# Patient Record
Sex: MALE | Race: WHITE | Employment: OTHER | URBAN - METROPOLITAN AREA
[De-identification: names, ages, dates, MRNs, and addresses within clinical notes are randomized per-mention and may not be internally consistent; named-entity substitution may affect disease eponyms.]

---

## 2019-12-09 ENCOUNTER — HOSPITAL ENCOUNTER (EMERGENCY)
Age: 49
Discharge: HOME | End: 2019-12-09
Attending: STUDENT IN AN ORGANIZED HEALTH CARE EDUCATION/TRAINING PROGRAM
Payer: SELF-PAY

## 2019-12-09 ENCOUNTER — APPOINTMENT (OUTPATIENT)
Dept: GENERAL RADIOLOGY | Age: 49
End: 2019-12-09
Attending: NURSE PRACTITIONER
Payer: SELF-PAY

## 2019-12-09 VITALS
HEIGHT: 62 IN | DIASTOLIC BLOOD PRESSURE: 55 MMHG | SYSTOLIC BLOOD PRESSURE: 111 MMHG | WEIGHT: 108 LBS | RESPIRATION RATE: 16 BRPM | OXYGEN SATURATION: 99 % | BODY MASS INDEX: 19.88 KG/M2 | HEART RATE: 85 BPM | TEMPERATURE: 98.2 F

## 2019-12-09 DIAGNOSIS — E11.65 TYPE 2 DIABETES MELLITUS WITH HYPERGLYCEMIA, WITHOUT LONG-TERM CURRENT USE OF INSULIN (HCC): Primary | ICD-10-CM

## 2019-12-09 LAB
ALBUMIN SERPL-MCNC: 4.7 G/DL (ref 3.5–5)
ALBUMIN/GLOB SERPL: 1.3 {RATIO} (ref 1.1–2.2)
ALP SERPL-CCNC: 201 U/L (ref 45–117)
ALT SERPL-CCNC: 55 U/L (ref 12–78)
ANION GAP SERPL CALC-SCNC: 9 MMOL/L (ref 5–15)
APPEARANCE UR: CLEAR
AST SERPL-CCNC: 28 U/L (ref 15–37)
BASE EXCESS BLDV CALC-SCNC: 0 MMOL/L
BASOPHILS # BLD: 0.1 K/UL (ref 0–0.1)
BASOPHILS NFR BLD: 1 % (ref 0–1)
BDY SITE: ABNORMAL
BILIRUB SERPL-MCNC: 0.8 MG/DL (ref 0.2–1)
BILIRUB UR QL: NEGATIVE
BUN SERPL-MCNC: 10 MG/DL (ref 6–20)
BUN/CREAT SERPL: 7 (ref 12–20)
CALCIUM SERPL-MCNC: 9.2 MG/DL (ref 8.5–10.1)
CHLORIDE SERPL-SCNC: 92 MMOL/L (ref 97–108)
CO2 SERPL-SCNC: 26 MMOL/L (ref 21–32)
COLOR UR: ABNORMAL
COMMENT, HOLDF: NORMAL
CREAT SERPL-MCNC: 1.39 MG/DL (ref 0.7–1.3)
DIFFERENTIAL METHOD BLD: ABNORMAL
EOSINOPHIL # BLD: 0.1 K/UL (ref 0–0.4)
EOSINOPHIL NFR BLD: 1 % (ref 0–7)
ERYTHROCYTE [DISTWIDTH] IN BLOOD BY AUTOMATED COUNT: 11.6 % (ref 11.5–14.5)
EST. AVERAGE GLUCOSE BLD GHB EST-MCNC: 306 MG/DL
GLOBULIN SER CALC-MCNC: 3.6 G/DL (ref 2–4)
GLUCOSE BLD STRIP.AUTO-MCNC: 322 MG/DL (ref 65–100)
GLUCOSE BLD STRIP.AUTO-MCNC: >600 MG/DL (ref 65–100)
GLUCOSE SERPL-MCNC: 718 MG/DL (ref 65–100)
GLUCOSE UR STRIP.AUTO-MCNC: >1000 MG/DL
HBA1C MFR BLD: 12.3 % (ref 4–5.6)
HCO3 BLDV-SCNC: 25 MMOL/L (ref 23–28)
HCT VFR BLD AUTO: 46.8 % (ref 36.6–50.3)
HGB BLD-MCNC: 16.8 G/DL (ref 12.1–17)
HGB UR QL STRIP: NEGATIVE
IMM GRANULOCYTES # BLD AUTO: 0 K/UL (ref 0–0.04)
IMM GRANULOCYTES NFR BLD AUTO: 0 % (ref 0–0.5)
KETONES SERPL QL: ABNORMAL
KETONES UR QL STRIP.AUTO: 15 MG/DL
LEUKOCYTE ESTERASE UR QL STRIP.AUTO: NEGATIVE
LYMPHOCYTES # BLD: 0.7 K/UL (ref 0.8–3.5)
LYMPHOCYTES NFR BLD: 6 % (ref 12–49)
MAGNESIUM SERPL-MCNC: 2.1 MG/DL (ref 1.6–2.4)
MCH RBC QN AUTO: 32.1 PG (ref 26–34)
MCHC RBC AUTO-ENTMCNC: 35.9 G/DL (ref 30–36.5)
MCV RBC AUTO: 89.3 FL (ref 80–99)
MONOCYTES # BLD: 0.5 K/UL (ref 0–1)
MONOCYTES NFR BLD: 4 % (ref 5–13)
NEUTS SEG # BLD: 11 K/UL (ref 1.8–8)
NEUTS SEG NFR BLD: 88 % (ref 32–75)
NITRITE UR QL STRIP.AUTO: NEGATIVE
NRBC # BLD: 0 K/UL (ref 0–0.01)
NRBC BLD-RTO: 0 PER 100 WBC
PCO2 BLDV: 42 MMHG (ref 41–51)
PH BLDV: 7.39 [PH] (ref 7.32–7.42)
PH UR STRIP: 5 [PH] (ref 5–8)
PLATELET # BLD AUTO: 249 K/UL (ref 150–400)
PMV BLD AUTO: 10.2 FL (ref 8.9–12.9)
PO2 BLDV: 49 MMHG (ref 25–40)
POTASSIUM SERPL-SCNC: 3.9 MMOL/L (ref 3.5–5.1)
PROT SERPL-MCNC: 8.3 G/DL (ref 6.4–8.2)
PROT UR STRIP-MCNC: NEGATIVE MG/DL
RBC # BLD AUTO: 5.24 M/UL (ref 4.1–5.7)
RBC MORPH BLD: ABNORMAL
SAMPLES BEING HELD,HOLD: NORMAL
SAO2 % BLDV: 84 % (ref 65–88)
SAO2% DEVICE SAO2% SENSOR NAME: ABNORMAL
SERVICE CMNT-IMP: ABNORMAL
SERVICE CMNT-IMP: ABNORMAL
SODIUM SERPL-SCNC: 127 MMOL/L (ref 136–145)
SP GR UR REFRACTOMETRY: 1 (ref 1–1.03)
SPECIMEN SITE: ABNORMAL
UROBILINOGEN UR QL STRIP.AUTO: 0.2 EU/DL (ref 0.2–1)
WBC # BLD AUTO: 12.4 K/UL (ref 4.1–11.1)

## 2019-12-09 PROCEDURE — 36415 COLL VENOUS BLD VENIPUNCTURE: CPT

## 2019-12-09 PROCEDURE — 96361 HYDRATE IV INFUSION ADD-ON: CPT

## 2019-12-09 PROCEDURE — 93005 ELECTROCARDIOGRAM TRACING: CPT

## 2019-12-09 PROCEDURE — 82803 BLOOD GASES ANY COMBINATION: CPT

## 2019-12-09 PROCEDURE — 85025 COMPLETE CBC W/AUTO DIFF WBC: CPT

## 2019-12-09 PROCEDURE — 80053 COMPREHEN METABOLIC PANEL: CPT

## 2019-12-09 PROCEDURE — 81003 URINALYSIS AUTO W/O SCOPE: CPT

## 2019-12-09 PROCEDURE — 82962 GLUCOSE BLOOD TEST: CPT

## 2019-12-09 PROCEDURE — 74011250636 HC RX REV CODE- 250/636: Performed by: STUDENT IN AN ORGANIZED HEALTH CARE EDUCATION/TRAINING PROGRAM

## 2019-12-09 PROCEDURE — 99285 EMERGENCY DEPT VISIT HI MDM: CPT

## 2019-12-09 PROCEDURE — 96360 HYDRATION IV INFUSION INIT: CPT

## 2019-12-09 PROCEDURE — 74011250636 HC RX REV CODE- 250/636: Performed by: NURSE PRACTITIONER

## 2019-12-09 PROCEDURE — 87086 URINE CULTURE/COLONY COUNT: CPT

## 2019-12-09 PROCEDURE — 83735 ASSAY OF MAGNESIUM: CPT

## 2019-12-09 PROCEDURE — 82009 KETONE BODYS QUAL: CPT

## 2019-12-09 PROCEDURE — 83036 HEMOGLOBIN GLYCOSYLATED A1C: CPT

## 2019-12-09 RX ORDER — METFORMIN HYDROCHLORIDE 500 MG/1
500 TABLET ORAL 2 TIMES DAILY WITH MEALS
Qty: 60 TAB | Refills: 0 | Status: SHIPPED | OUTPATIENT
Start: 2019-12-09 | End: 2019-12-10 | Stop reason: SDUPTHER

## 2019-12-09 RX ORDER — SODIUM CHLORIDE 9 MG/ML
1000 INJECTION, SOLUTION INTRAVENOUS ONCE
Status: COMPLETED | OUTPATIENT
Start: 2019-12-09 | End: 2019-12-09

## 2019-12-09 RX ORDER — LANCETS
EACH MISCELLANEOUS
Qty: 1 EACH | Refills: 11 | Status: SHIPPED | OUTPATIENT
Start: 2019-12-09

## 2019-12-09 RX ORDER — INSULIN PUMP SYRINGE, 3 ML
EACH MISCELLANEOUS
Qty: 1 KIT | Refills: 0 | Status: SHIPPED | OUTPATIENT
Start: 2019-12-09

## 2019-12-09 RX ADMIN — SODIUM CHLORIDE 1000 ML: 900 INJECTION, SOLUTION INTRAVENOUS at 13:36

## 2019-12-09 RX ADMIN — SODIUM CHLORIDE 1000 ML: 900 INJECTION, SOLUTION INTRAVENOUS at 13:07

## 2019-12-09 RX ADMIN — SODIUM CHLORIDE 1000 ML: 900 INJECTION, SOLUTION INTRAVENOUS at 14:26

## 2019-12-09 NOTE — ED TRIAGE NOTES
Pt sent from [facility] due to possible new onset diabetes. Reports feeling increase in thirst, weight loss since this past Thanksgiving.

## 2019-12-09 NOTE — ED NOTES
12:06 PM  I have just evaluated the patient. I have reviewed His vital signs and determined there is currently no worsening in their condition or physical exam. I have talked with the patient and the family and advised them that I am the provider in triage and have ordered lab work, x rays and other diagnostic tests. I have advised them that we will try and get them to the back as soon as possible. I have also advised them that should they have a worsening condition or any problems before they are sent back to the main ED, to contact me or the triage nurse. Initial Complaint: Elevated BG    Started: Before Thanksgiving    Endorses: Sent by [facility]. Polyuria, polydipsia, weight loss. Usually ~ 118, was weighed at 108 today. Denies: polyphagia      No further complaints. Orders placed.      Primary care provider: Arbie Fothergill, MD (Inactive)    Juan Antonio Rivera NP

## 2019-12-09 NOTE — DISCHARGE INSTRUCTIONS
RETURN IF BLOOD SUGAR IS PERSISTENTLY OVER 400  RETURN IF SYMPTOMS--VOMITING, ABDOMINAL PAIN, DIZZINESS  IF YOUR SUGAR DROPS BELOW 70 THEN EAT/DRINK SOMETHING WITH SUGAR TO HELP BRING IT UP

## 2019-12-09 NOTE — PROGRESS NOTES
12/9/2019  3:06 PM  Date of previous inpatient admission/ ED visit? N/A  What brought the patient back to ED? Fatigue, increased thirst weight loss since Thanksgiving    Did patient decline recommended services during last admission/ ED visit (if yes, what)? N/A    Has patient seen a provider since their last inpatient admission/ED visit (if yes, when)? No    CM Interventions:  From previous inpatient admission/ED visit:  N/A  From current inpatient admission/ED visit:  Consult noted to assist pt w/ diabetic supplies, PCP. CM met w/ pt he confirmed he does not have current PCP or medical insurance, pt is self-employed and unable to afford premiums. Pt has not applied for Medicaid since 1/2019. · Pt is agreeable to go to [facility] for PCP. · CM scheduled pt w/ PCP f/u 12/10/19 and added to AVS.    ·       CM provided pt w/ contact information for [facility] to apply for Medicaid   · pt has care Card application and will complete it. · CM provided pt w/ information for Free Medications at [facility] which includes Metformin  · CM provided contact information, added to AVS for [facility] to schedule classes. Pt has no questions, voiced understanding of the plan.   Edgar Ibarra

## 2019-12-09 NOTE — ED NOTES
Discharge paperwork reviewed with patient, patient educated on sign and symptoms related to diabetes and when to come back to the ED, pt understands all teaching at this time and has no questions.

## 2019-12-10 ENCOUNTER — OFFICE VISIT (OUTPATIENT)
Dept: FAMILY MEDICINE CLINIC | Age: 49
End: 2019-12-10

## 2019-12-10 VITALS
SYSTOLIC BLOOD PRESSURE: 116 MMHG | DIASTOLIC BLOOD PRESSURE: 66 MMHG | RESPIRATION RATE: 18 BRPM | TEMPERATURE: 98.6 F | BODY MASS INDEX: 21.09 KG/M2 | HEIGHT: 62 IN | WEIGHT: 114.6 LBS | HEART RATE: 68 BPM | OXYGEN SATURATION: 99 %

## 2019-12-10 DIAGNOSIS — E11.65 TYPE 2 DIABETES MELLITUS WITH HYPERGLYCEMIA, UNSPECIFIED WHETHER LONG TERM INSULIN USE (HCC): Primary | ICD-10-CM

## 2019-12-10 DIAGNOSIS — Z76.89 ENCOUNTER TO ESTABLISH CARE: ICD-10-CM

## 2019-12-10 LAB
ATRIAL RATE: 75 BPM
CALCULATED P AXIS, ECG09: 78 DEGREES
CALCULATED R AXIS, ECG10: 56 DEGREES
CALCULATED T AXIS, ECG11: 55 DEGREES
DIAGNOSIS, 93000: NORMAL
GLUCOSE DOSE-GTT, POCT, GLDSPOCT: 227
P-R INTERVAL, ECG05: 128 MS
Q-T INTERVAL, ECG07: 372 MS
QRS DURATION, ECG06: 84 MS
QTC CALCULATION (BEZET), ECG08: 415 MS
VENTRICULAR RATE, ECG03: 75 BPM

## 2019-12-10 RX ORDER — METFORMIN HYDROCHLORIDE 500 MG/1
500 TABLET ORAL 2 TIMES DAILY WITH MEALS
Qty: 120 TAB | Refills: 0 | Status: SHIPPED | OUTPATIENT
Start: 2019-12-10 | End: 2019-12-10

## 2019-12-10 RX ORDER — METFORMIN HYDROCHLORIDE 500 MG/1
500 TABLET ORAL 2 TIMES DAILY WITH MEALS
Qty: 120 TAB | Refills: 0 | Status: SHIPPED | OUTPATIENT
Start: 2019-12-10 | End: 2020-01-30 | Stop reason: SDUPTHER

## 2019-12-10 NOTE — PROGRESS NOTES
Subjective  Tana Guzman is an 44 y.o. male who presents to establish care for management of diabetes. Pt shares he went to the ED yesterday and dx with DM. Initial POC glucose greater than 600. A1C also noted to be 12.3%. received 3 L of NS. Discharged with POC glucose of 322. Pt started on metformin 500 mg BID. This is a new diagnosis. He reports a few weeks of increased thirst, urinary frequency, fatigue, and blurred vision prior to going to ED. His got new glasses 5 days ago to correct his vision. Notes diet is not controlled. Drinks soda, sweet ice tea, and juices during the day. Fast food for most of his meals. He feels much better today. He has improved diet and has eliminated all intake of drinking anything except water. Last meal was 30 min ago, roast beef sandwhich    He denies abdominal pain, chest pain, nausea, vomiting, diarrhea, fever, and chills. Social hx: denies tobacco use, EtOH use, and illicit drug use    No family history of diabetes    Allergies - reviewed:   No Known Allergies      Medications - reviewed:   Current Outpatient Medications   Medication Sig    metFORMIN (GLUCOPHAGE) 500 mg tablet Take 1 Tab by mouth two (2) times daily (with meals).  Blood-Glucose Meter monitoring kit Use 4x per day to check sugar    glucose blood VI test strips (BLOOD GLUCOSE TEST) strip Use to check sugar 4 times per day    lancets misc Use to check sugar 4 times per day     No current facility-administered medications for this visit. Social History - reviewed:  Social History     Tobacco Use    Smoking status: Never Smoker    Smokeless tobacco: Never Used   Substance and Sexual Activity    Alcohol use: Not Currently       ROS  Constitutional: Negative for chills and fever. HENT: Negative for congestion and sore throat. Eyes: Negative for pain and redness. Respiratory: Negative for cough and shortness of breath.     Cardiovascular: Negative for chest pain and palpitations. Gastrointestinal: Negative for abdominal pain, diarrhea, nausea and vomiting. Genitourinary: Negative for dysuria, hematuria. Musculoskeletal: Negative for back pain and neck pain. Skin: Negative for rash and wound. Neurological: Negative for dizziness and headaches. Hematological: Does not bruise/bleed easily. All other systems reviewed and are negative. Physical Exam  Visit Vitals  /66   Pulse 68   Temp 98.6 °F (37 °C)   Resp 18   Ht 5' 2\" (1.575 m)   Wt 114 lb 9.6 oz (52 kg)   SpO2 99%   BMI 20.96 kg/m²       General appearance - alert, well appearing but thin, and in no distress  Eyes - pupils equal and reactive, extraocular eye movements intact  Ears - bilateral TM's and external ear canals normal  Nose - normal and patent, no erythema, discharge or polyps  Mouth - mucous membranes moist, pharynx normal without lesions  Neck - supple, no significant adenopathy  Chest - clear to auscultation, no wheezes, rales or rhonchi, symmetric air entry  Heart - normal rate, regular rhythm, normal S1, S2, no murmurs, rubs, clicks or gallops  Abdomen - soft, nontender, nondistended, no masses or organomegaly  Neurological - alert, oriented, normal speech, no focal findings or movement disorder noted  Musculoskeletal - no joint tenderness, deformity or swelling  Extremities - peripheral pulses normal, no pedal edema, no clubbing or cyanosis  Skin - normal coloration and turgor, no rashes, no suspicious skin lesions noted    Personally reviewed:  POC glucose 227      Assessment/Plan    ICD-10-CM ICD-9-CM    1. Type 2 diabetes mellitus with hyperglycemia, unspecified whether long term insulin use (HCC) E11.65 250.00 AMB POC GLUCOSE TEST      metFORMIN (GLUCOPHAGE) 500 mg tablet      DISCONTINUED: metFORMIN (GLUCOPHAGE) 500 mg tablet   2. Encounter to establish care Z76.89 V65.8      Uncontrolled DM: new dx noted yesterday in ED after a few weeks of symptoms.  Has improved diet  -Discussed diet and lifestyle in great detail. Has appt with diabetic education  -Continue metformin, will need to monitor BG for now. If BG not below 200 in 2 days than increase to 1000 mg in am and 500 mg in pm. SE profile reviewed. Will follow up next week with BG log. If not improved will add second agent. Pt agreed with plan  -precautions given to when to seek medical attn. All questions answered. Discussed with attending      I have discussed the diagnosis with the patient and the intended plan as seen in the above orders. Patient verbalized understanding of the plan and agrees with the plan. The patient has received an after-visit summary and questions were answered concerning future plans. I have discussed medication side effects and warnings with the patient as well. Informed patient to return to the office if new symptoms arise.         Mike Bates DO  Family Medicine Resident

## 2019-12-10 NOTE — PATIENT INSTRUCTIONS

## 2019-12-11 LAB
BACTERIA SPEC CULT: NORMAL
CC UR VC: NORMAL
SERVICE CMNT-IMP: NORMAL

## 2019-12-20 ENCOUNTER — OFFICE VISIT (OUTPATIENT)
Dept: FAMILY MEDICINE CLINIC | Age: 49
End: 2019-12-20

## 2019-12-20 VITALS
HEART RATE: 75 BPM | BODY MASS INDEX: 20.61 KG/M2 | HEIGHT: 62 IN | RESPIRATION RATE: 18 BRPM | SYSTOLIC BLOOD PRESSURE: 130 MMHG | WEIGHT: 112 LBS | TEMPERATURE: 98 F | OXYGEN SATURATION: 97 % | DIASTOLIC BLOOD PRESSURE: 54 MMHG

## 2019-12-20 DIAGNOSIS — E11.65 TYPE 2 DIABETES MELLITUS WITH HYPERGLYCEMIA, UNSPECIFIED WHETHER LONG TERM INSULIN USE (HCC): Primary | ICD-10-CM

## 2019-12-20 NOTE — PROGRESS NOTES
[facility] [address] [place] [address]   Office [phone], Fax [phone]    Subjective:   Kateryna Galindo is a 44 y.o. male   CC:   Chief Complaint   Patient presents with    High Blood Sugar     Follow up from 12/10/19      History provided by patient     HPI:    Diabetes F/U  He is checking his BG FBG, before lunch and before dinner. His BG logs shows BG 190s-250s. Today's readings 190s. He has changed his diet since dx of diabetes. Cut out sweat tea, juices and soda since last visit. Feeling better than before. No fatigue or Nausea. Vision is better  Diet: Tries to do low carb and minimize sugary snacks. Snacks: peanuts; Breakfast: apple and Currently on: Metformin 1000mg BID  Patient is unwilling to try insulin. \"I rather die\". He only wants to be on oral agents and the cheaper the better. A1c 12.3%   No FH of diabetes    History reviewed. No pertinent past medical history. No Known Allergies  Current Outpatient Medications on File Prior to Visit   Medication Sig Dispense Refill    metFORMIN (GLUCOPHAGE) 500 mg tablet Take 1 Tab by mouth two (2) times daily (with meals). 120 Tab 0    Blood-Glucose Meter monitoring kit Use 4x per day to check sugar 1 Kit 0    glucose blood VI test strips (BLOOD GLUCOSE TEST) strip Use to check sugar 4 times per day 120 Strip 2    lancets misc Use to check sugar 4 times per day 1 Each 11     No current facility-administered medications on file prior to visit. History reviewed. No pertinent family history. Social History     Socioeconomic History    Marital status:      Spouse name: Not on file    Number of children: Not on file    Years of education: Not on file    Highest education level: Not on file   Tobacco Use    Smoking status: Never Smoker    Smokeless tobacco: Never Used   Substance and Sexual Activity    Alcohol use: Not Currently     History reviewed. No pertinent surgical history.     There is no problem list on file for this patient. ROS      Objective:     Visit Vitals  /54 (BP 1 Location: Left arm, BP Patient Position: Sitting)   Pulse 75   Temp 98 °F (36.7 °C) (Oral)   Resp 18   Ht 5' 2\" (1.575 m)   Wt 112 lb (50.8 kg)   SpO2 97%   BMI 20.49 kg/m²        Physical Exam    GEN: Alert and oriented. In no acute distress  HEENT: Normocephalic /Atraumatic. Sclera anicterus  CV: RRR. No M/R/R  Resp: Normal work of breathing. CTAB. No wheezes    Assessment and orders:     Jazmin Lizama is a 44 y.o. WHITE OR  male presents with:     ICD-10-CM ICD-9-CM    1. Type 2 diabetes mellitus with hyperglycemia, unspecified whether long term insulin use (HCC) E11.65 250.00        - BG improved since starting Metformin. Continue Metformin 100mg BID  - Continue to log BG  - Declined starting insulin  - Will hold off starting 2nd agent, can consider Glipizide as 2nd agent   - Discussed with patient that he needs an annual physical exam with labs     Follow up: 2 weeks    I have reviewed patient medical and social history and medications. I have reviewed pertinent labs results and other data. I have discussed the diagnosis with the patient and the intended plan as seen in the above orders. The patient has received an after-visit summary and questions were answered concerning future plans. I have discussed medication side effects and warnings with the patient as well.     Patient discussed with Dr. Keegan Salmon, Attending Physician    MD Shital Casaino Family Medicine Resident  12/21/19

## 2019-12-20 NOTE — PROGRESS NOTES
Chief Complaint   Patient presents with    High Blood Sugar     Follow up from 12/10/19      Reviewed the chart and obtain necessary information for visit. 1. Have you been to the ER, urgent care clinic since your last visit? Hospitalized since your last visit? No    2. Have you seen or consulted any other health care providers outside of the [address] [place] [address] since your last visit? Include any pap smears or colon screening.  No

## 2019-12-20 NOTE — PATIENT INSTRUCTIONS
Nutrition Tips for Diabetes: After Your Visit  Your Care Instructions  A healthy diet is important to manage diabetes. It helps you lose weight (if you need to) and keep it off. It gives you the nutrition and energy your body needs and helps prevent heart disease. But a diet for diabetes does not mean that you have to eat special foods. You can eat what your family eats, including occasional sweets and other favorites. But you do have to pay attention to how often you eat and how much you eat of certain foods. The right plan for you will give you meals that help you keep your blood sugar at healthy levels. Try to eat a variety of foods and to spread carbohydrate throughout the day. Carbohydrate raises blood sugar higher and more quickly than any other nutrient does. Carbohydrate is found in sugar, breads and cereals, fruit, starchy vegetables such as potatoes and corn, and milk and yogurt. You may want to work with a dietitian or diabetes educator to help you plan meals and snacks. A dietitian or diabetes educator also can help you lose weight if that is one of your goals. The following tips can help you enjoy your meals and stay healthy. Follow-up care is a key part of your treatment and safety. Be sure to make and go to all appointments, and call your doctor if you are having problems. Its also a good idea to know your test results and keep a list of the medicines you take. How can you care for yourself at home? · Learn which foods have carbohydrate and how much carbohydrate to eat. A dietitian or diabetes educator can help you learn to keep track of how much carbohydrate you eat. · Spread carbohydrate throughout the day. Eat some carbohydrate at all meals, but do not eat too much at any one time. · Plan meals to include food from all the food groups.  These are the food groups and some example portion sizes:  ¨ Grains: 1 slice of bread (1 ounce), ½ cup of cooked cereal, and 1/3 cup of cooked pasta or rice. These have about 15 grams of carbohydrate in a serving. Choose whole grains such as whole wheat bread or crackers, oatmeal, and brown rice more often than refined grains. ¨ Fruit: 1 small fresh fruit, such as an apple or orange; ½ of a banana; ½ cup of chopped, cooked, or canned fruit; ½ cup of fruit juice; 1 cup of melon or raspberries; and 2 tablespoons of dried fruit. These have about 15 grams of carbohydrate in a serving. ¨ Dairy: 1 cup of nonfat or low-fat milk and 2/3 cup of plain yogurt. These have about 15 grams of carbohydrate in a serving. ¨ Protein foods: Beef, chicken, turkey, fish, eggs, tofu, cheese, cottage cheese, and peanut butter. A serving size of meat is 3 ounces, which is about the size of a deck of cards. Examples of meat substitute serving sizes (equal to 1 ounce of meat) are 1/4 cup of cottage cheese, 1 egg, 1 tablespoon of peanut butter, and ½ cup of tofu. These have very little or no carbohydrate per serving. ¨ Vegetables: Starchy vegetables such as ½ cup of cooked dried beans, peas, potatoes, or corn have about 15 grams of carbohydrate. Nonstarchy vegetables have very little carbohydrate, such as 1 cup of raw leafy vegetables (such as spinach), ½ cup of other vegetables (cooked or chopped), and 3/4 cup of vegetable juice. · Use the plate format to plan meals. It is a good, quick way to make sure that you have a balanced meal. It also helps you spread carbohydrate throughout the day. You divide your plate by types of foods. Put vegetables on half the plate, meat or meat substitutes on one-quarter of the plate, and a grain or starchy vegetable (such as brown rice or a potato) in the final quarter of the plate. To this you can add a small piece of fruit and 1 cup of milk or yogurt, depending on how much carbohydrate you are supposed to eat at a meal.  · Talk to your dietitian or diabetes educator about ways to add limited amounts of sweets into your meal plan.  You can eat these foods now and then, as long as you include the amount of carbohydrate they have in your daily carbohydrate allowance. · If you drink alcohol, limit it to no more than 1 drink a day for women and 2 drinks a day for men. If you are pregnant, no amount of alcohol is known to be safe. · Protein, fat, and fiber do not raise blood sugar as much as carbohydrate does. If you eat a lot of these nutrients in a meal, your blood sugar will rise more slowly than it would otherwise. · Limit saturated fats, such as those from meat and dairy products. Try to replace it with monounsaturated fat, such as olive oil. This is a healthier choice because people who have diabetes are at higher-than-average risk of heart disease. But use a modest amount of olive oil. A tablespoon of olive oil has 14 grams of fat and 120 calories. · Exercise lowers blood sugar. If you take insulin by shots or pump, you can use less than you would if you were not exercising. Keep in mind that timing matters. If you exercise within 1 hour after a meal, your body may need less insulin for that meal than it would if you exercised 3 hours after the meal. Test your blood sugar to find out how exercise affects your need for insulin. · Exercise on most days of the week. Aim for at least 30 minutes. Exercise helps you stay at a healthy weight and helps your body use insulin. Walking is an easy way to get exercise. Gradually increase the amount you walk every day. You also may want to swim, bike, or do other activities. When you eat out  · Learn to estimate the serving sizes of foods that have carbohydrate. If you measure food at home, it will be easier to estimate the amount in a serving of restaurant food. · If the meal you order has too much carbohydrate (such as potatoes, corn, or baked beans), ask to have a low-carbohydrate food instead. Ask for a salad or green vegetables.   · If you use insulin, check your blood sugar before and after eating out to help you plan how much to eat in the future. · If you eat more carbohydrate at a meal than you had planned, take a walk or do other exercise. This will help lower your blood sugar. Where can you learn more? Go to [employer].be  Enter S174 in the search box to learn more about \"Nutrition Tips for Diabetes: After Your Visit. \"   © 4852-4687 Health[employer], Incorporated. Care instructions adapted under license by [facility] (which disclaims liability or warranty for this information). This care instruction is for use with your licensed healthcare professional. If you have questions about a medical condition or this instruction, always ask your healthcare professional. [address] any warranty or liability for your use of this information. Content Version: 24.5.447766; Current as of: June 4, 2014                 Learning About Meal Planning for Diabetes  Why plan your meals? Meal planning can be a key part of managing diabetes. Planning meals and snacks with the right balance of carbohydrate, protein, and fat can help you keep your blood sugar at the target level you set with your doctor. You don't have to eat special foods. You can eat what your family eats, including sweets once in a while. But you do have to pay attention to how often you eat and how much you eat of certain foods. You may want to work with a dietitian or a certified diabetes educator. He or she can give you tips and meal ideas and can answer your questions about meal planning. This health professional can also help you reach a healthy weight if that is one of your goals. What plan is right for you? Your dietitian or diabetes educator may suggest that you start with the plate format or carbohydrate counting. The plate format  The plate format is a simple way to help you manage how you eat. You plan meals by learning how much space each food should take on a plate.  Using the plate format helps you spread carbohydrate throughout the day. It can make it easier to keep your blood sugar level within your target range. It also helps you see if you're eating healthy portion sizes. To use the plate format, you put non-starchy vegetables on half your plate. Add meat or meat substitutes on one-quarter of the plate. Put a grain or starchy vegetable (such as brown rice or a potato) on the final quarter of the plate. You can add a small piece of fruit and some low-fat or fat-free milk or yogurt, depending on your carbohydrate goal for each meal.  Here are some tips for using the plate format:  · Make sure that you are not using an oversized plate. A 9-inch plate is best. Many restaurants use larger plates. · Get used to using the plate format at home. Then you can use it when you eat out. · Write down your questions about using the plate format. Talk to your doctor, a dietitian, or a diabetes educator about your concerns. Carbohydrate counting  With carbohydrate counting, you plan meals based on the amount of carbohydrate in each food. Carbohydrate raises blood sugar higher and more quickly than any other nutrient. It is found in desserts, breads and cereals, and fruit. It's also found in starchy vegetables such as potatoes and corn, grains such as rice and pasta, and milk and yogurt. Spreading carbohydrate throughout the day helps keep your blood sugar levels within your target range. Your daily amount depends on several things, including your weight, how active you are, which diabetes medicines you take, and what your goals are for your blood sugar levels. A registered dietitian or diabetes educator can help you plan how much carbohydrate to include in each meal and snack. A guideline for your daily amount of carbohydrate is:  · 45 to 60 grams at each meal. That's about the same as 3 to 4 carbohydrate servings. · 15 to 20 grams at each snack. That's about the same as 1 carbohydrate serving.   The Nutrition Facts label on packaged foods tells you how much carbohydrate is in a serving of the food. First, look at the serving size on the food label. Is that the amount you eat in a serving? All of the nutrition information on a food label is based on that serving size. So if you eat more or less than that, you'll need to adjust the other numbers. Total carbohydrate is the next thing you need to look for on the label. If you count carbohydrate servings, one serving of carbohydrate is 15 grams. For foods that don't come with labels, such as fresh fruits and vegetables, you'll need a guide that lists carbohydrate in these foods. Ask your doctor, dietitian, or diabetes educator about books or other nutrition guides you can use. If you take insulin, you need to know how many grams of carbohydrate are in a meal. This lets you know how much rapid-acting insulin to take before you eat. If you use an insulin pump, you get a constant rate of insulin during the day. So the pump must be programmed at meals to give you extra insulin to cover the rise in blood sugar after meals. When you know how much carbohydrate you will eat, you can take the right amount of insulin. Or, if you always use the same amount of insulin, you need to make sure that you eat the same amount of carbohydrate at meals. If you need more help to understand carbohydrate counting and food labels, ask your doctor, dietitian, or diabetes educator. How do you get started with meal planning? Here are some tips to get started:  · Plan your meals a week at a time. Don't forget to include snacks too. · Use cookbooks or online recipes to plan several main meals. Plan some quick meals for busy nights. You also can double some recipes that freeze well. Then you can save half for other busy nights when you don't have time to cook. · Make sure you have the ingredients you need for your recipes.  If you're running low on basic items, put these items on your shopping list too.  · List foods that you use to make breakfasts, lunches, and snacks. List plenty of fruits and vegetables. · Post this list on the refrigerator. Add to it as you think of more things you need. · Take the list to the store to do your weekly shopping. Follow-up care is a key part of your treatment and safety. Be sure to make and go to all appointments, and call your doctor if you are having problems. It's also a good idea to know your test results and keep a list of the medicines you take. Where can you learn more? Go to http://karl-jen.info/. Damion Rodriguez in the search box to learn more about \"Learning About Meal Planning for Diabetes. \"  Current as of: April 16, 2019  Content Version: 12.2  © 9407-5439 [employer]. Care instructions adapted under license by [facility] (which disclaims liability or warranty for this information). If you have questions about a medical condition or this instruction, always ask your healthcare professional. [address] any warranty or liability for your use of this information.

## 2020-01-30 ENCOUNTER — TELEPHONE (OUTPATIENT)
Dept: FAMILY MEDICINE CLINIC | Age: 50
End: 2020-01-30

## 2020-01-30 DIAGNOSIS — E11.65 TYPE 2 DIABETES MELLITUS WITH HYPERGLYCEMIA, UNSPECIFIED WHETHER LONG TERM INSULIN USE (HCC): ICD-10-CM

## 2020-01-30 RX ORDER — METFORMIN HYDROCHLORIDE 500 MG/1
500 TABLET ORAL 2 TIMES DAILY WITH MEALS
Qty: 120 TAB | Refills: 0 | Status: SHIPPED | OUTPATIENT
Start: 2020-01-30 | End: 2020-03-04 | Stop reason: SDUPTHER

## 2020-01-30 NOTE — TELEPHONE ENCOUNTER
----- Message from Evita Montenegro sent at 1/30/2020 10:44 AM EST -----  Regarding: Dr. Dionna Fonseca  Env Medication Refill    Caller (if not patient):      Relationship of caller (if not patient):      Best contact number(s): [phone]      Name of medication and dosage if known: Metformin 500mg       Is patient out of this medication (yes/no):  No      Pharmacy name: [facility]     Pharmacy listed in chart? (yes/no): Yes     Pharmacy phone number: [phone]      Details to clarify the request:      Evita Montenegro

## 2020-03-04 ENCOUNTER — TELEPHONE (OUTPATIENT)
Dept: FAMILY MEDICINE CLINIC | Age: 50
End: 2020-03-04

## 2020-03-04 DIAGNOSIS — E11.65 TYPE 2 DIABETES MELLITUS WITH HYPERGLYCEMIA, UNSPECIFIED WHETHER LONG TERM INSULIN USE (HCC): Primary | ICD-10-CM

## 2020-03-04 RX ORDER — METFORMIN HYDROCHLORIDE 500 MG/1
1000 TABLET ORAL 2 TIMES DAILY WITH MEALS
Qty: 120 TAB | Refills: 0 | Status: SHIPPED | OUTPATIENT
Start: 2020-03-04 | End: 2020-04-03 | Stop reason: SDUPTHER

## 2020-03-04 RX ORDER — METFORMIN HYDROCHLORIDE 500 MG/1
1000 TABLET ORAL 2 TIMES DAILY WITH MEALS
Qty: 120 TAB | Refills: 0 | Status: SHIPPED | OUTPATIENT
Start: 2020-03-04 | End: 2020-03-04 | Stop reason: SDUPTHER

## 2020-03-04 NOTE — TELEPHONE ENCOUNTER
----- Message from LaunchKey Backer sent at 3/4/2020 10:31 AM EST -----  Regarding: Dr. Ran [place]: [id number] (if not patient): n/a  Relationship of caller (if not patient): n/a  Best contact number(s):   [phone]  Name of medication and dosage if known: Metformin-dosage unknown  Is patient out of this medication (yes/no): Yes  Pharmacy name: [address] [place] [address] St listed in chart? (yes/no): Yes  Pharmacy phone number: [phone]  Date of last visit: January 9, 2020  04:00 PM  Details to clarify the request: Pt was wondering if her could get a longer supply of his medication.

## 2020-03-17 ENCOUNTER — TELEPHONE (OUTPATIENT)
Dept: FAMILY MEDICINE CLINIC | Age: 50
End: 2020-03-17

## 2020-03-17 NOTE — TELEPHONE ENCOUNTER
Patient is doing well currently and he does not have any acute needs or medication refills.   Patient understands that his office visit will be canceled tomorrow because of non-essential visit

## 2020-04-03 DIAGNOSIS — E11.65 TYPE 2 DIABETES MELLITUS WITH HYPERGLYCEMIA, UNSPECIFIED WHETHER LONG TERM INSULIN USE (HCC): ICD-10-CM

## 2020-04-03 RX ORDER — METFORMIN HYDROCHLORIDE 500 MG/1
1000 TABLET ORAL 2 TIMES DAILY WITH MEALS
Qty: 120 TAB | Refills: 0 | Status: SHIPPED | OUTPATIENT
Start: 2020-04-03 | End: 2020-05-05

## 2020-05-04 ENCOUNTER — TELEPHONE (OUTPATIENT)
Dept: FAMILY MEDICINE CLINIC | Age: 50
End: 2020-05-04

## 2020-05-04 NOTE — TELEPHONE ENCOUNTER
[place]/Refill   Received: Today   Message Contents   [address]   Phone Number: [phone]             Caller (if not patient): N/A   Relationship of caller (if not patient): N/A   Best contact number(s): [phone]   Name of medication and dosage if known:  \"Metformin 500 MG\"   Is patient out of this medication (yes/no):  No   Pharmacy name: [facility]   Pharmacy listed in chart? (yes/no): Yes   Pharmacy phone number: [phone]   Date of last visit: 12/20/2019   Details to clarify the request: N/A

## 2020-05-13 ENCOUNTER — TELEPHONE (OUTPATIENT)
Dept: FAMILY MEDICINE CLINIC | Age: 50
End: 2020-05-13

## 2020-05-13 NOTE — TELEPHONE ENCOUNTER
Patient calling and states the pharmacy only gave him 2 weeks of medication for metformin (GLUCOPHAGE) 500 mg tablet  And not 30 days. He states he took his last pill this morning. Patient has virtual visit in the morning at office and I explained the doctor could address at that time. Patient states he's very concerned as he takes his medication at the same time every day and has to go directly to work after his virtual visit and will not be able to get medication right away. Patient asking to be contacted and assured that he will be okay with not taking medication timely and that this won't affect anything. Asking to be contacted ASAP with response.     Ottoniel Guzman (Self) [phone] (H)

## 2020-05-14 ENCOUNTER — VIRTUAL VISIT (OUTPATIENT)
Dept: FAMILY MEDICINE CLINIC | Age: 50
End: 2020-05-14

## 2020-05-14 DIAGNOSIS — E11.9 TYPE 2 DIABETES MELLITUS WITHOUT COMPLICATION, WITHOUT LONG-TERM CURRENT USE OF INSULIN (HCC): Primary | ICD-10-CM

## 2020-05-14 DIAGNOSIS — N18.9 CHRONIC KIDNEY DISEASE, UNSPECIFIED CKD STAGE: ICD-10-CM

## 2020-05-14 DIAGNOSIS — Z28.21 REFUSED PNEUMOCOCCAL VACCINATION: ICD-10-CM

## 2020-05-14 RX ORDER — METFORMIN HYDROCHLORIDE 500 MG/1
1000 TABLET ORAL 2 TIMES DAILY WITH MEALS
Qty: 120 TAB | Refills: 0 | Status: SHIPPED | OUTPATIENT
Start: 2020-05-14 | End: 2020-06-13

## 2020-05-14 NOTE — TELEPHONE ENCOUNTER
Pt notified Metformin rx sent to pharmacy.  Pt states just received text from pharmacy that its ready for

## 2020-05-14 NOTE — PATIENT INSTRUCTIONS
Pneumococcal Polysaccharide Vaccine: What You Need to Know Why get vaccinated? Pneumococcal polysaccharide vaccine (PPSV23) can prevent pneumococcal disease. Pneumococcal disease refers to any illness caused by pneumococcal bacteria. These bacteria can cause many types of illnesses, including pneumonia, which is an infection of the lungs. Pneumococcal bacteria are one of the most common causes of pneumonia. Besides pneumonia, pneumococcal bacteria can also cause: 
· Ear infections, 
· Sinus infections · Meningitis (infection of the tissue covering the brain and spinal cord) · Bacteremia (bloodstream infection) Anyone can get pneumococcal disease, but children under 3years of age, people with certain medical conditions, adults 72 years or older, and cigarette smokers are at the highest risk. Most pneumococcal infections are mild. However, some can result in long-term problems, such as brain damage or hearing loss. Meningitis, bacteremia, and pneumonia caused by pneumococcal disease can be fatal. 
PPSV23 
PPSV23 protects against 23 types of bacteria that cause pneumococcal disease. PPSV23 is recommended for: · All adults 72 years or older, · Anyone 2 years or older with certain medical conditions that can lead to an increased risk for pneumococcal disease. Most people need only one dose of PPSV23. A second dose of PPSV23, and another type of pneumococcal vaccine called PCV13, are recommended for certain high-risk groups. Your health care provider can give you more information. People 65 years or older should get a dose of PPSV23 even if they have already gotten one or more doses of the vaccine before they turned 65. Talk with your health care provider Tell your vaccine provider if the person getting the vaccine: 
· Has had an allergic reaction after a previous dose of PPSV23, or has any severe, life-threatening allergies.  
In some cases, your health care provider may decide to postpone PPSV23 vaccination to a future visit. People with minor illnesses, such as a cold, may be vaccinated. People who are moderately or severely ill should usually wait until they recover before getting PPSV23. Your health care provider can give you more information. Risks of a vaccine reaction · Redness or pain where the shot is given, feeling tired, fever, or muscle aches can happen after PPSV23. People sometimes faint after medical procedures, including vaccination. Tell your provider if you feel dizzy or have vision changes or ringing in the ears. As with any medicine, there is a very remote chance of a vaccine causing a severe allergic reaction, other serious injury, or death. What if there is a serious problem? An allergic reaction could occur after the vaccinated person leaves the clinic. If you see signs of a severe allergic reaction (hives, swelling of the face and throat, difficulty breathing, a fast heartbeat, dizziness, or weakness), call 9-1-1 and get the person to the nearest hospital. 
For other signs that concern you, call your health care provider. Adverse reactions should be reported to the Vaccine Adverse Event Reporting System (VAERS). Your health care provider will usually file this report, or you can do it yourself. Visit the VAERS website at www.vaers. hhs.gov at www.vaers. hhs.gov or call 3-774.948.5822. VAERS is only for reporting reactions, and VAERS staff do not give medical advice. How can I learn more? · Ask your health care provider. · Call your local or state health department. · Contact the Centers for Disease Control and Prevention (CDC): 
? Call 4-339.226.7487 (1-800-CDC-INFO) or 
? Visit CDC's website at www.cdc.gov/vaccines Vaccine Information Statement PPSV23 Vaccine 10/30/2019 Department of [facility] and [employer] Centers for Disease Control and Prevention Many Vaccine Information Statements are available in Sami and other languages. See www.immunize.org/vis. Hojas de información Sobre Vacunas están disponibles en español y en muchos otros idiomas. Visite CalebScale.si. Care instructions adapted under license by [employer] (which disclaims liability or warranty for this information).  If you have questions about a medical condition or this instruction, always ask your healthcare professional. [address] any warranty or liability for your use of this information.

## 2020-05-14 NOTE — PROGRESS NOTES
Wes Bergman  44 y.o. male  1970  [address]  [id number]    [phone] (home)      [facility] [address]:    Telephone Encounter  Danny Son [place]       Encounter Date: 5/14/2020 at 9:08 AM    Consent:  He and/or the health care decision maker is aware that that he may receive a bill for this telephone service, depending on his insurance coverage, and has provided verbal consent to proceed: Yes    Chief Complaint   Patient presents with    Diabetes     History of Present Illness   Wes Bergman is a 44 y.o. male was evaluated by telephone. I communicated with the patient and/or health care decision maker about diabetes. DIABETIC CARE CHECKLIST   1. Patient on ASA  no  2. Patient on Statin- no  3. Patient on ACE- no  4. Diet breakfast has apple and banana, snack has peanuts, lunch has chicken or pork chop with vegetable or bean and salad, afternoon has celery for snack, dinner pork chop or fish or barbeque, drinks water and flavored water, will have small sugar free luz marina bar before bed, rarely eats fast food and nothing fried  5. 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 Last cholesterol check - never  9 . Last HbA1c - 12.3 12/9/19  10. Last urine microalbulmin- never  11. Last comprehensive foot exam  7696  96. Did patient receive pneumococcal vaccine -never  13. Does the patient have a nephrologist- no  14. Does the patient have a cardiologist- no  15. Seasonal influenza vaccine - no    Checks blood sugar twice daily. Typically runts . Typically 200s in morning. States the highest he has seen is 260s.      Social - states he got  6 years ago, is unemployed and has child support to pay, has little income and money for medications, smokes every now and then socially but has not smoked since diagnosed with diabetes     COVID19: no fever, chills, cough, shortness of breath, chest pain, no COVID19 exposure, no hospital/ ER visits in last 2 weeks    Review of Systems   Review of Systems   Respiratory: Negative for shortness of breath. Cardiovascular: Negative for chest pain. Endo/Heme/Allergies: Negative for polydipsia. Vitals/Objective:   General: Patient speaking in complete sentences without effort. Normal speech and cooperative. Due to this being a Virtual Check-in/Telephone evaluation, many elements of the physical examination are unable to be assessed. Assessment and Plan:   Time-based coding, delete if not needed: I spent at least 25 minutes with this established patient, and >50% of the time was spent counseling and/or coordinating care regarding diabetes management, diet, lifestyle changes  Total Time: minutes: 21-30 minutes    1. Type 2 diabetes mellitus without complication, without long-term current use of insulin (HCC)  Last A1c >12. Last Cr in 1.3 range. Do not have lipid panel or POC microalbumin/ creat. Has not had PNA shot. Need to check labs to determine what meds need to be added - ie lipid, ACEI, glyburide? Barriers include lack of insurance and cost concerns.   - HEMOGLOBIN A1C WITH EAG; Future  - LIPID PANEL; Future  - METABOLIC PANEL, COMPREHENSIVE; Future  - AMB POC URINE, MICROALBUMIN, SEMIQUANT (3 RESULTS); Future  - Refilled Metformin for 1 month, very clear if he does not get labs will not give any further refills    We discussed the expected course, resolution and complications of the diagnosis(es) in detail. Medication risks, benefits, costs, interactions, and alternatives were discussed as indicated. I advised him to contact the office if his condition worsens, changes or fails to improve as anticipated. He expressed understanding with the diagnosis(es) and plan. Patient understands that this encounter was a temporary measure, and the importance of further follow up and examination was emphasized.   Patient verbalized understanding. Patient informed to follow up: fasting labs in AM    I affirm this is a Patient Initiated Episode with an Established Patient who has not had a related appointment within my department in the past 7 days or scheduled within the next 24 hours. Note: not billable if this call serves to triage the patient into an appointment for the relevant concern    Patient discussed with Dr. Magdy Bacon (Attending)    Electronically Signed: Racheal Martinez DO  Providers location when delivering service: home      ICD-10-CM ICD-9-CM    1. Type 2 diabetes mellitus without complication, without long-term current use of insulin (HCC) E11.9 250.00 HEMOGLOBIN A1C WITH EAG      LIPID PANEL      METABOLIC PANEL, COMPREHENSIVE      AMB POC URINE, MICROALBUMIN, SEMIQUANT (3 RESULTS)      metFORMIN (GLUCOPHAGE) 500 mg tablet   2. Chronic kidney disease, unspecified CKD stage J83.8 487.1 METABOLIC PANEL, COMPREHENSIVE      AMB POC URINE, MICROALBUMIN, SEMIQUANT (3 RESULTS)   3. Refused pneumococcal vaccination Z28.21 V64.06        Pursuant to the emergency declaration under the [facility] waiver authority and the [employer] and [employer] Act, this Virtual  Visit was conducted, with patient's consent, to reduce the patient's risk of exposure to COVID-19 and provide continuity of care for an established patient. History   Patients past medical, surgical and family histories were personally reviewed and updated. No past medical history on file. No past surgical history on file. No family history on file.   Social History     Socioeconomic History    Marital status:      Spouse name: Not on file    Number of children: Not on file    Years of education: Not on file    Highest education level: Not on file   Occupational History    Not on file   Social Needs    Financial resource strain: Not on file   [place] insecurity     Worry: Not on file     Inability: Not on file    Transportation needs     Medical: Not on file     Non-medical: Not on file   Tobacco Use    Smoking status: Never Smoker    Smokeless tobacco: Never Used   Substance and Sexual Activity    Alcohol use: Not Currently    Drug use: Not on file    Sexual activity: Not on file   Lifestyle    Physical activity     Days per week: Not on file     Minutes per session: Not on file    Stress: Not on file   Relationships    Social connections     Talks on phone: Not on file     Gets together: Not on file     Attends [employer] service: Not on file     Active member of club or organization: Not on file     Attends meetings of clubs or organizations: Not on file     Relationship status: Not on file    Intimate partner violence     Fear of current or ex partner: Not on file     Emotionally abused: Not on file     Physically abused: Not on file     Forced sexual activity: Not on file   Other Topics Concern    Not on file   Social History Narrative    Not on file            Current Medications/Allergies   Medications and Allergies reviewed:    Current Outpatient Medications   Medication Sig Dispense Refill    metFORMIN (GLUCOPHAGE) 500 mg tablet Take 2 Tabs by mouth two (2) times daily (with meals) for 30 days.  120 Tab 0    Blood-Glucose Meter monitoring kit Use 4x per day to check sugar 1 Kit 0    glucose blood VI test strips (BLOOD GLUCOSE TEST) strip Use to check sugar 4 times per day 120 Strip 2    lancets misc Use to check sugar 4 times per day 1 Each 11     No Known Allergies

## 2020-05-29 NOTE — PROGRESS NOTES
[address] Medicine Residency Attending Addendum:  Dr. Darryl Matthew, DO,  the patient and I were not physically present during this encounter. The resident and I are concurrently monitoring the patient care through appropriate telecommunication technology. I discussed the findings, assessment and plan with the resident and agree with the resident's findings and plan as documented in the resident's note.       Tiago Wall MD

## 2020-06-11 ENCOUNTER — LAB ONLY (OUTPATIENT)
Dept: FAMILY MEDICINE CLINIC | Age: 50
End: 2020-06-11

## 2020-06-11 ENCOUNTER — HOSPITAL ENCOUNTER (OUTPATIENT)
Dept: LAB | Age: 50
Discharge: HOME | End: 2020-06-11

## 2020-06-11 DIAGNOSIS — E11.9 TYPE 2 DIABETES MELLITUS WITHOUT COMPLICATION, WITHOUT LONG-TERM CURRENT USE OF INSULIN (HCC): ICD-10-CM

## 2020-06-11 DIAGNOSIS — N18.9 CHRONIC KIDNEY DISEASE, UNSPECIFIED CKD STAGE: ICD-10-CM

## 2020-06-11 LAB
ALBUMIN SERPL-MCNC: 3.8 G/DL (ref 3.5–5)
ALBUMIN UR QL STRIP: 30 MG/L
ALBUMIN/GLOB SERPL: 1.3 {RATIO} (ref 1.1–2.2)
ALP SERPL-CCNC: 114 U/L (ref 45–117)
ALT SERPL-CCNC: 36 U/L (ref 12–78)
ANION GAP SERPL CALC-SCNC: 9 MMOL/L (ref 5–15)
AST SERPL-CCNC: 20 U/L (ref 15–37)
BILIRUB SERPL-MCNC: 0.4 MG/DL (ref 0.2–1)
BUN SERPL-MCNC: 19 MG/DL (ref 6–20)
BUN/CREAT SERPL: 20 (ref 12–20)
CALCIUM SERPL-MCNC: 8.7 MG/DL (ref 8.5–10.1)
CHLORIDE SERPL-SCNC: 99 MMOL/L (ref 97–108)
CHOLEST SERPL-MCNC: 153 MG/DL
CO2 SERPL-SCNC: 27 MMOL/L (ref 21–32)
CREAT SERPL-MCNC: 0.97 MG/DL (ref 0.7–1.3)
CREATININE, URINE POC: 50 MG/DL
EST. AVERAGE GLUCOSE BLD GHB EST-MCNC: 298 MG/DL
GLOBULIN SER CALC-MCNC: 3 G/DL (ref 2–4)
GLUCOSE SERPL-MCNC: 339 MG/DL (ref 65–100)
HBA1C MFR BLD: 12 % (ref 4–5.6)
HDLC SERPL-MCNC: 35 MG/DL
HDLC SERPL: 4.4 {RATIO} (ref 0–5)
LDLC SERPL CALC-MCNC: 80.6 MG/DL (ref 0–100)
LIPID PROFILE,FLP: ABNORMAL
MICROALBUMIN/CREAT RATIO POC: NORMAL MG/G
POTASSIUM SERPL-SCNC: 4.2 MMOL/L (ref 3.5–5.1)
PROT SERPL-MCNC: 6.8 G/DL (ref 6.4–8.2)
SODIUM SERPL-SCNC: 135 MMOL/L (ref 136–145)
TRIGL SERPL-MCNC: 187 MG/DL (ref ?–150)
VLDLC SERPL CALC-MCNC: 37.4 MG/DL

## 2020-06-12 ENCOUNTER — TELEPHONE (OUTPATIENT)
Dept: FAMILY MEDICINE CLINIC | Age: 50
End: 2020-06-12

## 2020-06-12 NOTE — TELEPHONE ENCOUNTER
Called patient and scheduled him for:    Appointment Information  The following appointments have been successfully scheduled:    Date/time Wednesday, June 17, 2020 08:00 AM  Patient Delicia Thomson 1970 (17AK M) #[id number] [id number]  Department [facility]MAIN OFFICE  Appointment type Telemedicine 30 My Chart  Provider [address]  Appointment type notes Telemedicine 30 My Chart  For visits related to COVID    Close enc

## 2020-06-12 NOTE — TELEPHONE ENCOUNTER
----- Message from Brent Benítez DO sent at 6/12/2020 10:43 AM EDT -----  Regarding: needs virtual visit to follow up labs  Hi this patient needs a virtual visit to follow up his labs. A1c is 12 and we crow to talk about other options to control diabetes better. If I don't have slots available he can be scheduled with anyone. Thanks!

## 2020-06-15 ENCOUNTER — TELEPHONE (OUTPATIENT)
Dept: FAMILY MEDICINE CLINIC | Age: 50
End: 2020-06-15

## 2020-06-15 NOTE — TELEPHONE ENCOUNTER
/ Rx   Received: Today   Message Contents   Deshaun, [facility] [address] Office   Phone Number:  [phone] (Call me)             Patient is requesting a refill on [facility]. Patient's medication will run out on Tuesday. Labs were done 06/11/2020. Metformin is not on current listing, Temporary refill given until these labs were drawn.

## 2020-06-16 RX ORDER — METFORMIN HYDROCHLORIDE 500 MG/1
1000 TABLET ORAL 2 TIMES DAILY WITH MEALS
Qty: 120 TAB | Refills: 0 | Status: SHIPPED | OUTPATIENT
Start: 2020-06-16 | End: 2020-06-17 | Stop reason: SDUPTHER

## 2020-06-17 ENCOUNTER — VIRTUAL VISIT (OUTPATIENT)
Dept: FAMILY MEDICINE CLINIC | Age: 50
End: 2020-06-17

## 2020-06-17 DIAGNOSIS — E11.65 UNCONTROLLED TYPE 2 DIABETES MELLITUS WITH HYPERGLYCEMIA (HCC): Primary | ICD-10-CM

## 2020-06-17 RX ORDER — ATORVASTATIN CALCIUM 40 MG/1
40 TABLET, FILM COATED ORAL DAILY
Qty: 90 TAB | Refills: 1 | Status: SHIPPED | OUTPATIENT
Start: 2020-06-17 | End: 2021-03-03 | Stop reason: SDUPTHER

## 2020-06-17 RX ORDER — METFORMIN HYDROCHLORIDE 500 MG/1
1000 TABLET ORAL 2 TIMES DAILY WITH MEALS
Qty: 120 TAB | Refills: 3 | Status: SHIPPED | OUTPATIENT
Start: 2020-06-17 | End: 2020-07-17 | Stop reason: SDUPTHER

## 2020-06-17 RX ORDER — GLIPIZIDE 5 MG/1
5 TABLET, FILM COATED, EXTENDED RELEASE ORAL DAILY
Qty: 90 TAB | Refills: 0 | Status: SHIPPED | OUTPATIENT
Start: 2020-06-17 | End: 2020-09-25 | Stop reason: SDUPTHER

## 2020-06-18 NOTE — PROGRESS NOTES
[address] Medicine Residency Attending Addendum:  Dr. Joellen Berg MD,  the patient and I were not physically present during this encounter. The resident and I are concurrently monitoring the patient care through appropriate telecommunication technology. I discussed the findings, assessment and plan with the resident and agree with the resident's findings and plan as documented in the resident's note. 45 yo male with uncontrolled DM. HgA1C 12. Glipizide added to the regimen.  Will recheck HgA1C in 3 months    Kellen Shelton MD

## 2020-06-25 ENCOUNTER — TELEPHONE (OUTPATIENT)
Dept: FAMILY MEDICINE CLINIC | Age: 50
End: 2020-06-25

## 2020-07-17 RX ORDER — METFORMIN HYDROCHLORIDE 500 MG/1
1000 TABLET ORAL 2 TIMES DAILY WITH MEALS
Qty: 120 TAB | Refills: 3 | Status: SHIPPED | OUTPATIENT
Start: 2020-07-17 | End: 2020-10-12

## 2020-07-17 NOTE — TELEPHONE ENCOUNTER
----- Message from Cyn He sent at 7/17/2020  9:45 AM EDT -----  Regarding: Dr Michael Brady: [phone]  Medication Refill    Caller (if not patient):      Relationship of caller (if not patient):      Best contact number(s):[phone]      Name of medication and dosage if known:Metformin 500 mg      Is patient out of this medication (yes/no):no, will be out by [address] name:[facility] pharmacy    Pharmacy listed in chart? (yes/no):yes  Pharmacy phone [phone]      Details to clarify the request:      Cyn He

## 2020-09-25 DIAGNOSIS — E11.65 UNCONTROLLED TYPE 2 DIABETES MELLITUS WITH HYPERGLYCEMIA (HCC): ICD-10-CM

## 2020-09-25 RX ORDER — GLIPIZIDE 5 MG/1
5 TABLET, FILM COATED, EXTENDED RELEASE ORAL DAILY
Qty: 90 TAB | Refills: 0 | Status: SHIPPED | OUTPATIENT
Start: 2020-09-25 | End: 2020-11-09 | Stop reason: SDUPTHER

## 2020-09-25 NOTE — TELEPHONE ENCOUNTER
----- Message from Kamran Stephenson sent at 9/24/2020 12:39 PM EDT -----  Regarding: DR DANGELO  /    REFILL  General Message/Vendor Calls    1. \" GLIPIZIDE 5 MG \"    To be called into the [facility] listed in chart.  P:  [phone]        Callback required  Best contact number(s):  [phone]            Kamran Stephenson

## 2020-10-12 RX ORDER — METFORMIN HYDROCHLORIDE 500 MG/1
TABLET ORAL
Qty: 120 TAB | Refills: 3 | Status: SHIPPED | OUTPATIENT
Start: 2020-10-12 | End: 2020-11-09 | Stop reason: SDUPTHER

## 2020-11-09 DIAGNOSIS — E11.65 UNCONTROLLED TYPE 2 DIABETES MELLITUS WITH HYPERGLYCEMIA (HCC): ICD-10-CM

## 2020-11-10 RX ORDER — METFORMIN HYDROCHLORIDE 500 MG/1
TABLET ORAL
Qty: 120 TAB | Refills: 3 | Status: SHIPPED | OUTPATIENT
Start: 2020-11-10 | End: 2021-03-03 | Stop reason: SDUPTHER

## 2020-11-10 RX ORDER — GLIPIZIDE 5 MG/1
5 TABLET, FILM COATED, EXTENDED RELEASE ORAL DAILY
Qty: 90 TAB | Refills: 0 | Status: SHIPPED | OUTPATIENT
Start: 2020-11-10 | End: 2021-03-03 | Stop reason: SDUPTHER

## 2020-12-11 NOTE — TELEPHONE ENCOUNTER
----- Message from Keshia Hernandez sent at 12/7/2020  1:04 PM EST -----  Regarding: Dr. Ishaan Resendez (if not patient):  Relationship of caller (if not patient):  Best contact number(s): [phone]   Name of medication and dosage if known: Metformin dosage unknown   Is patient out of this medication (yes/no):yes   Pharmacy name: [facility]   Pharmacy listed in chart? (yes/no):yes   Pharmacy phone number: [phone]   Date of last visit:6/17/2020   Details to clarify the request:

## 2020-12-13 RX ORDER — METFORMIN HYDROCHLORIDE 500 MG/1
TABLET ORAL
Qty: 120 TAB | Refills: 3 | OUTPATIENT
Start: 2020-12-13

## 2021-03-02 ENCOUNTER — TELEPHONE (OUTPATIENT)
Dept: FAMILY MEDICINE CLINIC | Age: 51
End: 2021-03-02

## 2021-03-02 NOTE — TELEPHONE ENCOUNTER
Called and scheduled pt for vv appt tomorrow to discuss if he has a gum infection or not and to get antibiotic if needed and also to discuss med refills on metformin . Closed enc .

## 2021-03-02 NOTE — TELEPHONE ENCOUNTER
----- Message from Jessica Rosado sent at 3/1/2021 10:05 AM EST -----  Regarding: Dr. Shae Dietrich telephone  Level 1/Escalated Issue      Caller's first and last name  and relationship (if not the patient): pt       Best contact number(s):[phone]      What are the symptoms: gum infection (pt is requesting an antibiotic to be called into his pharmacy, pt declined appt.)      Transfer successful - yes/no (include outcome): no       Transfer declined - yes/no (include reason): no       Was caller advised to seek appropriate level of care - yes/no: yes       Details to clarify the request:  Pt states he is diabetic and has been loosing his teeth and states his gums has been sore for the past couple of days.          Jessica Rosado

## 2021-03-03 ENCOUNTER — VIRTUAL VISIT (OUTPATIENT)
Dept: FAMILY MEDICINE CLINIC | Age: 51
End: 2021-03-03

## 2021-03-03 DIAGNOSIS — M27.2 ODONTOGENIC INFECTION OF JAW: ICD-10-CM

## 2021-03-03 DIAGNOSIS — E78.2 MIXED HYPERLIPIDEMIA: ICD-10-CM

## 2021-03-03 DIAGNOSIS — E11.65 UNCONTROLLED TYPE 2 DIABETES MELLITUS WITH HYPERGLYCEMIA (HCC): Primary | ICD-10-CM

## 2021-03-03 PROCEDURE — 99443 PR PHYS/QHP TELEPHONE EVALUATION 21-30 MIN: CPT | Performed by: STUDENT IN AN ORGANIZED HEALTH CARE EDUCATION/TRAINING PROGRAM

## 2021-03-03 RX ORDER — GLIPIZIDE 10 MG/1
10 TABLET, FILM COATED, EXTENDED RELEASE ORAL DAILY
Qty: 90 TAB | Refills: 2 | Status: SHIPPED | OUTPATIENT
Start: 2021-03-03 | End: 2021-12-20

## 2021-03-03 RX ORDER — METFORMIN HYDROCHLORIDE 1000 MG/1
1000 TABLET ORAL 2 TIMES DAILY WITH MEALS
Qty: 180 TAB | Refills: 2 | Status: SHIPPED | OUTPATIENT
Start: 2021-03-03 | End: 2021-03-03

## 2021-03-03 RX ORDER — AMOXICILLIN AND CLAVULANATE POTASSIUM 875; 125 MG/1; MG/1
1 TABLET, FILM COATED ORAL EVERY 12 HOURS
Qty: 14 TAB | Refills: 0 | Status: SHIPPED | OUTPATIENT
Start: 2021-03-03 | End: 2021-03-10

## 2021-03-03 RX ORDER — ATORVASTATIN CALCIUM 40 MG/1
40 TABLET, FILM COATED ORAL DAILY
Qty: 90 TAB | Refills: 2 | Status: SHIPPED | OUTPATIENT
Start: 2021-03-03

## 2021-03-03 RX ORDER — METFORMIN HYDROCHLORIDE 1000 MG/1
1000 TABLET ORAL 2 TIMES DAILY WITH MEALS
Qty: 180 TAB | Refills: 2 | Status: SHIPPED | OUTPATIENT
Start: 2021-03-03 | End: 2022-01-25 | Stop reason: SDUPTHER

## 2021-03-03 NOTE — PROGRESS NOTES
Evangelista Heck  44 y.o. male  1970  [address]  [id number]    [phone] (home)      [address]:    Telephone Encounter  [place]       Encounter Date: 3/3/2021 at 10:15 AM    Consent: Evangelista Heck, who was seen by synchronous (real-time) audio only technology, and/or his healthcare decision maker, is aware that this patient-initiated, Telehealth encounter on 3/3/2021 is a billable service, with coverage as determined by his insurance carrier. He is aware that he may receive a bill and has provided verbal consent to proceed: Yes. Chief Complaint   Patient presents with    Diabetes    Other     tooth infection       History of Present Illness   Evangelista Heck is a 44 y.o. male was evaluated by telephone. I communicated with the patient and/or health care decision maker about tooth infection + metformin refill. 1 week of tooth pain/sensitivity in the front of his mouth. Has lost several teeth since has diabetes. Taken tylenol for relief. Difficult to eat hard foods. Has gargled salt water to help too. Has appt with dentist at the end of the month. Going to get dentures at the end of the month. Last saw dentist 6 months ago. 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 Taking metformin 1000mg BID + glipizide 5mg daily. Is not willing to start insulin at this time as he does not have insurance/cost.   Reports eating chicken, green beans, salad for lunch everyday. Will see eye doctor.     Lab Results   Component Value Date/Time    Hemoglobin A1c 12.0 (H) 06/11/2020 08:05 AM     Lab Results   Component Value Date/Time    Cholesterol, total 153 06/11/2020 08:05 AM    HDL Cholesterol 35 06/11/2020 08:05 AM    LDL, calculated 80.6 06/11/2020 08:05 AM    VLDL, calculated 37.4 06/11/2020 08:05 AM    Triglyceride 187 (H) 06/11/2020 08:05 AM    CHOL/HDL Ratio 4.4 06/11/2020 08:05 AM Review of Systems   Review of Systems   Constitutional: Negative for chills and fever. HENT: Negative for congestion. Respiratory: Negative for cough and shortness of breath. Cardiovascular: Negative for chest pain, palpitations and leg swelling. Gastrointestinal: Negative for abdominal pain, nausea and vomiting. Skin: Negative for rash. Vitals/Objective:   General: Patient speaking in complete sentences without effort. Normal speech and cooperative. Due to this being a Virtual Check-in/Telephone evaluation, many elements of the physical examination are unable to be assessed. Assessment and Plan: Total Time: minutes: 21-30 minutes    1. Uncontrolled type 2 diabetes mellitus with hyperglycemia ([address] Still having hyperglycemia. Will increase glipizide. Change metformin to the 1000mg tablet for ease of use/compliance. Needs A1C checked - patient not able to tell me when he can come in for lab visit and said he will call back. - continue keeping BG log  - follow up in 1 month for VV to discuss diabetes/log; 3 months for A1C  - can consider starting humulin N in the future if patient is willing ($25 at [facility] for vial)  - glipiZIDE SR (GLUCOTROL XL) 10 mg CR tablet; Take 1 Tab by mouth daily. Dispense: 90 Tab; Refill: 2  - metFORMIN (GLUCOPHAGE) 1,000 mg tablet; Take 1 Tab by mouth two (2) times daily (with meals). Dispense: 180 Tab; Refill: 2  - HEMOGLOBIN A1C WITH EAG; Future  - atorvastatin (LIPITOR) 40 mg tablet; Take 1 Tab by mouth daily. Dispense: 90 Tab; Refill: 2    2. Mixed hyperlipidemia Patient unaware that he was supposed to be on cholesterol medicine. Advised to start taking given his diabetes. - atorvastatin (LIPITOR) 40 mg tablet; Take 1 Tab by mouth daily. Dispense: 90 Tab; Refill: 2    3.  Odontogenic infection of jaw Will start on Abx.  - continue tylenol for pain; can take ibuprofen as well  - follow up with dentist  - amoxicillin-clavulanate (AUGMENTIN) 875-125 mg per tablet; Take 1 Tab by mouth every twelve (12) hours for 7 days. Dispense: 14 Tab; Refill: 0      Patient informed to follow up: 1 month to review BG log; 3 months for repeat A1C    I affirm this is a Patient Initiated Episode with an Established Patient who has not had a related appointment within my department in the past 7 days or scheduled within the next 24 hours. Note: not billable if this call serves to triage the patient into an appointment for the relevant concern      Electronically Signed: Salem Primrose, DO  Providers location when delivering service: home      ICD-10-CM ICD-9-CM    1. Uncontrolled type 2 diabetes mellitus with hyperglycemia (HCC)  E11.65 250.02 glipiZIDE SR (GLUCOTROL XL) 10 mg CR tablet      HEMOGLOBIN A1C WITH EAG      atorvastatin (LIPITOR) 40 mg tablet      metFORMIN (GLUCOPHAGE) 1,000 mg tablet      DISCONTINUED: metFORMIN (GLUCOPHAGE) 1,000 mg tablet      DISCONTINUED: metFORMIN (GLUCOPHAGE) 1,000 mg tablet   2. Mixed hyperlipidemia  E78.2 272.2 atorvastatin (LIPITOR) 40 mg tablet   3. Odontogenic infection of jaw  M27.2 526.4 amoxicillin-clavulanate (AUGMENTIN) 875-125 mg per tablet       Pursuant to the emergency declaration under the [facility] waiver authority and the [employer] Act, this Virtual  Visit was conducted, with patient's consent, to reduce the patient's risk of exposure to COVID-19 and provide continuity of care for an established patient. History   Patients past medical, surgical and family histories were personally reviewed and updated. No past medical history on file. No past surgical history on file. No family history on file.   Social History     Tobacco Use    Smoking status: Never Smoker    Smokeless tobacco: Never Used   Substance Use Topics    Alcohol use: Not Currently    Drug use: Not on file              Current Medications/Allergies Medications and Allergies reviewed:    Current Outpatient Medications   Medication Sig Dispense Refill    amoxicillin-clavulanate (AUGMENTIN) 875-125 mg per tablet Take 1 Tab by mouth every twelve (12) hours for 7 days. 14 Tab 0    glipiZIDE SR (GLUCOTROL XL) 10 mg CR tablet Take 1 Tab by mouth daily. 90 Tab 2    atorvastatin (LIPITOR) 40 mg tablet Take 1 Tab by mouth daily. 90 Tab 2    metFORMIN (GLUCOPHAGE) 1,000 mg tablet Take 1 Tab by mouth two (2) times daily (with meals).  180 Tab 2    Blood-Glucose Meter monitoring kit Use 4x per day to check sugar 1 Kit 0    glucose blood VI test strips (BLOOD GLUCOSE TEST) strip Use to check sugar 4 times per day 120 Strip 2    lancets misc Use to check sugar 4 times per day 1 Each 11     No Known Allergies

## 2021-03-03 NOTE — PROGRESS NOTES
[address] Medicine Residency Attending Addendum:  Dr. Tawny Reyes, DO,  the patient and I were not physically present during this encounter. The resident and I are concurrently monitoring the patient care through appropriate telecommunication technology. I discussed the findings, assessment and plan with the resident and agree with the resident's findings and plan as documented in the resident's note.       Rosy Barron MD

## 2022-01-25 ENCOUNTER — TELEPHONE (OUTPATIENT)
Dept: FAMILY MEDICINE CLINIC | Age: 52
End: 2022-01-25

## 2022-01-25 DIAGNOSIS — E11.65 UNCONTROLLED TYPE 2 DIABETES MELLITUS WITH HYPERGLYCEMIA (HCC): ICD-10-CM

## 2022-01-25 RX ORDER — METFORMIN HYDROCHLORIDE 1000 MG/1
1000 TABLET ORAL 2 TIMES DAILY WITH MEALS
Qty: 60 TABLET | Refills: 0 | Status: SHIPPED | OUTPATIENT
Start: 2022-01-25 | End: 2022-02-08 | Stop reason: SDUPTHER

## 2022-02-07 NOTE — PROGRESS NOTES
Antoine Oliveira is an 46 y.o. male presents for follow up of diabetes. Patient has uncontrolled diabetes. Takes metformin 1000mg BID and glipizide 10mg daily. Patient refuses to take insulin or any other injectable medication. Checks glucose sometimes - range will be between 200 and 500, but gets frustrated that his blood sugar stays so high. Does report polyuria, generalized muscle weakness, and has had difficulty gaining weight since being diagnosed with diabetes. DIABETIC CARE CHECKLIST   1. Patient on ASA - No  2. Patient on Statin- Yes  3. Patient on ACE- No  4. 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 Blood pressure today - 128/76  7. Last eye check - 2 years ago  8. Last cholesterol check - 6/2020 (see below)  9 . Last HbA1c - 12.0 6/2020  10. Last urine microalbulmin- 6/2020  11. Last comprehensive foot exam -  will do today  12. Did patient receive pneumococcal vaccine - has not had, declines  13. Does the patient have a nephrologist- No  14. Does the patient have a cardiologist-No  15. Seasonal influenza vaccine - No, declines      Lab Results   Component Value Date/Time    Cholesterol, total 153 06/11/2020 08:05 AM    HDL Cholesterol 35 06/11/2020 08:05 AM    LDL, calculated 80.6 06/11/2020 08:05 AM    VLDL, calculated 37.4 06/11/2020 08:05 AM    Triglyceride 187 (H) 06/11/2020 08:05 AM    CHOL/HDL Ratio 4.4 06/11/2020 08:05 AM     Review of Systems   Review of Systems   Constitutional: Positive for weight loss. Negative for chills and fever. HENT: Negative for congestion. Respiratory: Negative for cough and shortness of breath. Cardiovascular: Negative for chest pain, palpitations and leg swelling. Gastrointestinal: Negative for abdominal pain, nausea and vomiting. Skin: Negative for rash. Neurological: Positive for weakness (muscle). Endo/Heme/Allergies:        Polyuria       Allergies   No Known Allergies    Medications  Current Outpatient Medications   Medication Sig    pioglitazone (ACTOS) 30 mg tablet Take 1 Tablet by mouth daily.  metFORMIN (GLUCOPHAGE) 1,000 mg tablet Take 1 Tablet by mouth two (2) times daily (with meals) for 30 days.  glipiZIDE SR (GLUCOTROL XL) 10 mg CR tablet Take 1 Tablet by mouth daily.  atorvastatin (LIPITOR) 40 mg tablet Take 1 Tab by mouth daily.  Blood-Glucose Meter monitoring kit Use 4x per day to check sugar    glucose blood VI test strips (BLOOD GLUCOSE TEST) strip Use to check sugar 4 times per day    lancets misc Use to check sugar 4 times per day     No current facility-administered medications for this visit. Medical History  No past medical history on file. Immunizations     There is no immunization history on file for this patient. Objective   Vital Signs  Visit Vitals  /76 (BP 1 Location: Right arm, BP Patient Position: Sitting)   Pulse 81   Temp 98.5 °F (36.9 °C)   Resp 16   Ht 5' 2\" (1.575 m)   Wt 108 lb (49 kg)   SpO2 99%   BMI 19.75 kg/m²       Physical Examination  Physical Exam  Constitutional:       General: He is not in acute distress. Appearance: He is not ill-appearing, toxic-appearing or diaphoretic. Comments: Cachectic appearing   HENT:      Head: Normocephalic and atraumatic. Eyes:      Conjunctiva/sclera: Conjunctivae normal.   Cardiovascular:      Rate and Rhythm: Normal rate and regular rhythm. Heart sounds: Normal heart sounds. No murmur heard. Pulmonary:      Effort: Pulmonary effort is normal. No respiratory distress. Breath sounds: Normal breath sounds. No wheezing. Abdominal:      General: There is no distension. Palpations: Abdomen is soft. Tenderness: There is no abdominal tenderness. Musculoskeletal:      Right lower leg: No edema. Left lower leg: No edema.       Comments: Diabetic Foot Exam:  Left: Reflexes 2+     Vibratory sensation normal    Proprioception normal    Filament test normal sensation with micro filament   Pulse DP: 2+ (normal)   Pulse PT: 2+ (normal)   Deformities: some onychomycosis, no hammer toe, no bunion   Edema: none   Fat pad at sole of foot: none   Skin: no wounds or calluses   Motor: moves all extremities    Tarsal tunnel syndrome: no   Hot/Cold sensation: plantar aspect none, dorsal aspect none      Right: Reflexes 2+     Vibratory sensation normal    Proprioception normal    Filament test normal sensation with micro filament   Pulse DP: 2+ (normal)   Pulse PT: 2+ (normal)   Deformities: some onychomycosis, no hammer toe, no bunion   Edema: none   Fat pad at sole of foot: none   Skin: no wounds or calluses   Motor: moves all extremities    Tarsal tunnel syndrome: no   Hot/Cold sensation: plantar aspect none, dorsal aspect none   Skin:     General: Skin is warm. Findings: No rash. Neurological:      General: No focal deficit present. Mental Status: He is alert. Assessment   Nav Johnston is a 46 y.o. who presents for follow up of diabetes. Plan   1. Uncontrolled type 2 diabetes mellitus with hyperglycemia ([address] Patient refuses to use insulin or any other injectable. Also has difficulty affording medication. Stressed importance that he needs insulin given his last A1C was 12.0. Discussed the symptoms he is feeling are classic for hyperglycemia and insulin resistance. Discussed by not taking insulin, he is at a much higher risk of complications from diabetes including but not limited to MI, CVA, DKA, HHS, gastroparesis, ESRD, and death. Will add actos in addition to metformin and glipizide. - keep BG log and follow up in 2 weeks  - HEMOGLOBIN A1C WITH EAG;  Future  - MICROALBUMIN, UR, RAND W/ MICROALB/CREAT RATIO; Future  - METABOLIC PANEL, BASIC; Future  -  DIABETES FOOT EXAM  - REFERRAL TO DIABETIC EDUCATION  - pioglitazone (ACTOS) 30 mg tablet; Take 1 Tablet by mouth daily. Dispense: 90 Tablet; Refill: 1  - METABOLIC PANEL, BASIC  - HEMOGLOBIN A1C WITH EAG  - metFORMIN (GLUCOPHAGE) 1,000 mg tablet; Take 1 Tablet by mouth two (2) times daily (with meals) for 30 days. Dispense: 60 Tablet; Refill: 5  - glipiZIDE SR (GLUCOTROL XL) 10 mg CR tablet; Take 1 Tablet by mouth daily. Dispense: 90 Tablet; Refill: 3    2. Mixed hyperlipidemia Continue atorvastatin. Will recheck lipid panel today. - discussed dietary modifications  - LIPID PANEL; Future  - LIPID PANEL      Follow-up and Dispositions    · Return in about 2 weeks (around 2/22/2022) for VV follow up to go over blood sugar log. Diabetes: Discussed with patient today that the goal for their diabetes is to have a HgA1C<7 and ideally as close to 6.5 as possible.  We discussed diet and medications.  The goal for the cholesterol LDL is less than 70 and HDL>40.  Patient is aware of the need for yearly eye exams and to take care of their feet daily.  Discussed with patient that blood pressure should be less than 130/80 and watching salt intake is very important. I have discussed the aforementioned diagnoses and plan with the patient in detail. I have provided information in person and/or in AVS. All questions answered prior to discharge.       I discussed this patient with Dr. Alec Fraser (Attending Physician)   Signed By:  Amandeep Liang DO    Family Medicine Resident

## 2022-02-08 ENCOUNTER — OFFICE VISIT (OUTPATIENT)
Dept: FAMILY MEDICINE CLINIC | Age: 52
End: 2022-02-08

## 2022-02-08 VITALS
RESPIRATION RATE: 16 BRPM | HEART RATE: 81 BPM | OXYGEN SATURATION: 99 % | TEMPERATURE: 98.5 F | SYSTOLIC BLOOD PRESSURE: 128 MMHG | BODY MASS INDEX: 19.88 KG/M2 | HEIGHT: 62 IN | WEIGHT: 108 LBS | DIASTOLIC BLOOD PRESSURE: 76 MMHG

## 2022-02-08 DIAGNOSIS — E11.65 UNCONTROLLED TYPE 2 DIABETES MELLITUS WITH HYPERGLYCEMIA (HCC): Primary | ICD-10-CM

## 2022-02-08 DIAGNOSIS — E78.2 MIXED HYPERLIPIDEMIA: ICD-10-CM

## 2022-02-08 LAB
ANION GAP SERPL CALC-SCNC: 7 MMOL/L (ref 5–15)
BUN SERPL-MCNC: 10 MG/DL (ref 6–20)
BUN/CREAT SERPL: 10 (ref 12–20)
CALCIUM SERPL-MCNC: 9.3 MG/DL (ref 8.5–10.1)
CHLORIDE SERPL-SCNC: 93 MMOL/L (ref 97–108)
CHOLEST SERPL-MCNC: 208 MG/DL
CO2 SERPL-SCNC: 28 MMOL/L (ref 21–32)
CREAT SERPL-MCNC: 0.97 MG/DL (ref 0.7–1.3)
EST. AVERAGE GLUCOSE BLD GHB EST-MCNC: 349 MG/DL
GLUCOSE SERPL-MCNC: 543 MG/DL (ref 65–100)
HBA1C MFR BLD: 13.8 % (ref 4–5.6)
HDLC SERPL-MCNC: 53 MG/DL
HDLC SERPL: 3.9 {RATIO} (ref 0–5)
LDLC SERPL CALC-MCNC: 119.2 MG/DL (ref 0–100)
POTASSIUM SERPL-SCNC: 4.5 MMOL/L (ref 3.5–5.1)
SODIUM SERPL-SCNC: 128 MMOL/L (ref 136–145)
TRIGL SERPL-MCNC: 179 MG/DL (ref ?–150)
VLDLC SERPL CALC-MCNC: 35.8 MG/DL

## 2022-02-08 PROCEDURE — 99214 OFFICE O/P EST MOD 30 MIN: CPT | Performed by: STUDENT IN AN ORGANIZED HEALTH CARE EDUCATION/TRAINING PROGRAM

## 2022-02-08 RX ORDER — PIOGLITAZONEHYDROCHLORIDE 30 MG/1
30 TABLET ORAL DAILY
Qty: 90 TABLET | Refills: 1 | Status: SHIPPED | OUTPATIENT
Start: 2022-02-08

## 2022-02-08 RX ORDER — METFORMIN HYDROCHLORIDE 1000 MG/1
1000 TABLET ORAL 2 TIMES DAILY WITH MEALS
Qty: 60 TABLET | Refills: 5 | Status: SHIPPED | OUTPATIENT
Start: 2022-02-08 | End: 2022-03-10

## 2022-02-08 RX ORDER — GLIPIZIDE 10 MG/1
10 TABLET, FILM COATED, EXTENDED RELEASE ORAL DAILY
Qty: 90 TABLET | Refills: 3 | Status: SHIPPED | OUTPATIENT
Start: 2022-02-08 | End: 2022-03-31 | Stop reason: SDUPTHER

## 2022-02-09 LAB
COMMENT, HOLDF: NORMAL
SAMPLES BEING HELD,HOLD: NORMAL

## 2022-02-10 NOTE — PROGRESS NOTES
Na corrected to 135, significant hyperglycemia, but AG normal. Lipid panel not at goal - unsure if he is taking lipitor as prescribed, if so, will need to increase dose. A1C is worse at 13.8. Patient needs insulin as oral medications will not work as well at this level, but patient adamantly refuses. Did just start actos in addition to metformin and glipizide.

## 2022-02-22 ENCOUNTER — TELEPHONE (OUTPATIENT)
Dept: FAMILY MEDICINE CLINIC | Age: 52
End: 2022-02-22

## 2022-02-22 NOTE — TELEPHONE ENCOUNTER
Called patient for VV today with no answer. If patient calls back, please reschedule appt.     Haley Carlson DO  Family Medicine Resident

## 2022-03-19 PROBLEM — E78.2 MIXED HYPERLIPIDEMIA: Status: ACTIVE | Noted: 2021-03-03

## 2022-03-20 PROBLEM — E11.65 UNCONTROLLED TYPE 2 DIABETES MELLITUS WITH HYPERGLYCEMIA (HCC): Status: ACTIVE | Noted: 2021-03-03

## 2022-03-31 DIAGNOSIS — E11.65 UNCONTROLLED TYPE 2 DIABETES MELLITUS WITH HYPERGLYCEMIA (HCC): ICD-10-CM

## 2022-03-31 RX ORDER — GLIPIZIDE 10 MG/1
TABLET, FILM COATED, EXTENDED RELEASE ORAL
Qty: 90 TABLET | Refills: 3 | OUTPATIENT
Start: 2022-03-31

## 2022-03-31 RX ORDER — GLIPIZIDE 10 MG/1
10 TABLET, FILM COATED, EXTENDED RELEASE ORAL DAILY
Qty: 90 TABLET | Refills: 3 | Status: SHIPPED | OUTPATIENT
Start: 2022-03-31

## 2022-10-04 ENCOUNTER — OFFICE VISIT (OUTPATIENT)
Dept: FAMILY MEDICINE CLINIC | Age: 52
End: 2022-10-04

## 2022-10-04 VITALS
HEART RATE: 80 BPM | RESPIRATION RATE: 16 BRPM | OXYGEN SATURATION: 99 % | DIASTOLIC BLOOD PRESSURE: 76 MMHG | BODY MASS INDEX: 17.85 KG/M2 | HEIGHT: 62 IN | SYSTOLIC BLOOD PRESSURE: 126 MMHG | WEIGHT: 97 LBS | TEMPERATURE: 97.7 F

## 2022-10-04 DIAGNOSIS — R63.4 WEIGHT LOSS: ICD-10-CM

## 2022-10-04 DIAGNOSIS — E11.65 UNCONTROLLED TYPE 2 DIABETES MELLITUS WITH HYPERGLYCEMIA (HCC): Primary | ICD-10-CM

## 2022-10-04 DIAGNOSIS — E78.2 MIXED HYPERLIPIDEMIA: ICD-10-CM

## 2022-10-04 PROCEDURE — 3046F HEMOGLOBIN A1C LEVEL >9.0%: CPT

## 2022-10-04 PROCEDURE — 99214 OFFICE O/P EST MOD 30 MIN: CPT

## 2022-10-04 RX ORDER — METFORMIN HYDROCHLORIDE 500 MG/1
TABLET ORAL 2 TIMES DAILY WITH MEALS
COMMUNITY
End: 2022-10-14 | Stop reason: SDUPTHER

## 2022-10-04 NOTE — PROGRESS NOTES
Ramesh Issa is a 45 y.o. male    Chief Complaint   Patient presents with    Weight Loss     Patient is coming in because he has noticed he lost a lot of weight. No other concerns. 1. Have you been to the ER, urgent care clinic since your last visit? Hospitalized since your last visit? No    2. Have you seen or consulted any other health care providers outside of the [address] [place] [address] since your last visit? Include any pap smears or colon screening. No      Visit Vitals  /76 (BP 1 Location: Right upper arm, BP Patient Position: Sitting)   Pulse 80   Temp 97.7 °F (36.5 °C) (Oral)   Resp 16   Ht 5' 2\" (1.575 m)   Wt 97 lb (44 kg)   SpO2 99%   BMI 17.74 kg/m²           Health Maintenance Due   Topic Date Due    Hepatitis C Screening  Never done    Depression Screen  Never done    COVID-19 Vaccine (1) Never done    Pneumococcal 0-64 years (1 - PCV) Never done    Eye Exam Retinal or Dilated  Never done    DTaP/Tdap/Td series (1 - Tdap) Never done    Colorectal Cancer Screening Combo  Never done    Shingrix Vaccine Age 50> (1 of 2) Never done    MICROALBUMIN Q1  06/11/2021    A1C test (Diabetic or Prediabetic)  05/08/2022    Flu Vaccine (1) Never done         Medication Reconciliation completed, changes noted.   Please  Update medication list.

## 2022-10-04 NOTE — PATIENT INSTRUCTIONS
For diabetes:  - Check blood sugars 4 times daily: once before breakfast, once before lunch and 2 hours after lunch, once before bed  - Administer insulin NPH (10 unit) once daily (may do at night)  - Increase Metformin 500 mg twice daily to a maximum dosage of 2500 mg daily. Please increase by 500 mg every 7 days until you are at 2500 mg daily. The essentials of losing weight and a diabetic lower carbohydrate diet. Exercise  You should exercise 45- 60 minutes every day. This reduces the stored energy in your muscles and allows your insulin level to fall. You can only lose weight when your insulin level is low. Then you burn stored energy. 2.  Fasting   a. You should fast every night from 12-14 hours. b.  Tyrone Jean are still using energy at night when you are sleeping and will burn stored energy. c.  Fasting allows you burn stored energy in your internal organs such as the liver. This allows the insulin level to drop.   d.  This allows you to start losing weight. 3.  No sugar!   a. You should try to eliminate sugar from your diet. b.  First, eliminate sweet drinks including:  sodas and tomas, sports drinks (like Gatorade or Poweraid), sweet tea and lemonade, wine (and beer), fruit juice (contains fruit sugar) and milk (contains milk sugar). c.  Eliminate sugary cereals, cookies and candies. No donuts, pastries or coffee cake. d.  Eat desserts only on special occasions:  family reunions, birthdays, anniversaries, major holidays. Eat only small desserts!   e. Start watching labels for foods that have added sugars. 4. Limit starches   a. Limit starches particularly:  bread, noodles, pasta, crackers and chips, rice, potatoes and fries. b.  Watch out for starchy vegetables:  corn and peas. c.  Women can have 30-45 grams of carbs per meal   d. Men can have 45-60 grams of carbs per meal   e. One piece of bread has 15-20 grams of carbs   f.   One half cup of oatmeal, corn, rice, peas and cooked pasta have about 15 grams of carbs. 5.  Fruit-special case   a. Fruit has nutrients we need such as Vitamin C but also contains a lot of fruit sugar (fructose)   b. Fruit is not a good snack because fructose does not suppress your appetite. c.  Fruit can cause progression of fatty liver disease which makes weight loss harder   d. Limit yourself to one serving of fruit per day and try to eat berries, small apples, oranges, nancy or grapefruit.           e.  Avoid high sugar fruits including mangos, bananas, grapes and cherries. f.  One serving of fruit is 1/2 to 3/4 of a cup.    6.  What do I eat instead?   a. Eat protein. It curbs your appetite longer than carbs do anyway. Eat meat, chicken, fish and eggs. b.  Eat healthy fats:  fish, olive oil, nuts   c. Eat more vegetables and salads. d.  Eat all of the above before you eat any carbs.   e.  Eat slowly and enjoy your food. f.  Drink more water. 7.  Snacks   a. Good snacks:  Cheese, nuts, Kind bars (minis), Protein bars, carrot sticks, celery sticks. b.  Read labels and look for snacks with low amounts of carbohydrate per serving (10 or less)   c. Try not to eat between meals. You'll lose more weight if you are not constantly putting energy into the system. 8.  Accountability   a. You have to keep yourself honest about your diet efforts. You need reminders to stick to your change in lifestyle and diet. b.  Weigh yourself daily   c.  Record your food intake either on an zohaib or in writing.   d.  Record your exercise. 9.  Support and emotional health. a.  Surround yourself with people that will help you and support your efforts. b. Avoid people that may sabotage your efforts or insist that they at least not undermine you.  c.  Be patient. An average patient loses only 3 pounds a month but that's 36 pounds at the end of a year. d.  Think long term. Try to keep up good exercise and diet habits.   Once you get the weight off, keep it off.  e.  Pay attention to your emotions about food and your weight. Have a healthy attitude towards yourself and your body. 10.  When do I get to go back to eating the way I did before? Answer: Never. If you resume your old patterns of eating that caused your weight gain, you'll just gain the weight back. Try to make permanent changes in how you live every day. It's not easy but you can do it.

## 2022-10-04 NOTE — PROGRESS NOTES
Chief Complaint:     Chief Complaint   Patient presents with    Weight Loss     Patient is coming in because he has noticed he lost a lot of weight. No other concerns. Subjective:   HPI:  Pro Covington is a 45 y.o. male with a history of poorly controlled T2DM that presents for: weight loss and muscle tone. Sister present in room providing ancillary history. 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 Subjectively reports overall improvement following self discontinuation.    - Patient was on 500 mg Metformin BID > then changed to once daily 2 months ago after he felt \"tired\", Glipizide 10 mg daily, and Actos 30 mg daily  - Reports associated symptoms of polyuria and polydipsia. - Patient has been previously counseled on starting insulin, but has refused primarily due to a lack of education regarding insulin therapy and also due to a lack of insurance. FH (+): Breast cancer (mom, passed at 48), no other malignancy    ROS:   Review of Systems   Constitutional:  Positive for malaise/fatigue and weight loss. Negative for chills and fever. Respiratory: Negative. Negative for cough and shortness of breath. Cardiovascular: Negative. Negative for chest pain. Gastrointestinal:  Positive for nausea. Negative for abdominal pain, diarrhea and vomiting. Genitourinary:         + polyuria   Neurological: Negative. Negative for dizziness. Endo/Heme/Allergies:  Positive for polydipsia. All other systems reviewed and are negative.     Health Maintenance:  Health Maintenance Due   Topic Date Due    Hepatitis C Screening  Never done    COVID-19 Vaccine (1) Never done Pneumococcal 0-64 years (1 - PCV) Never done    Eye Exam Retinal or Dilated  Never done    DTaP/Tdap/Td series (1 - Tdap) Never done    Colorectal Cancer Screening Combo  Never done    Shingrix Vaccine Age 50> (1 of 2) Never done    Flu Vaccine (1) Never done        Past medical history, social history, and medications personally reviewed. History reviewed. No pertinent past medical history. Allergies personally reviewed. No Known Allergies       Objective:   Vitals reviewed. Visit Vitals  /76 (BP 1 Location: Right upper arm, BP Patient Position: Sitting)   Pulse 80   Temp 97.7 °F (36.5 °C) (Oral)   Resp 16   Ht 5' 2\" (1.575 m)   Wt 97 lb (44 kg)   SpO2 99%   BMI 17.74 kg/m²        Physical Exam  General appearance - alert, cachectic appearing, and in no distress  Eyes - pink conjunctiva bilaterally  Chest - normal chest excursion, normal inspiratory and expiratory function. Clear to ausculation bilaterally. Heart - normal rate, regular rhythm, normal S1, S2, no murmurs, rubs, clicks or gallops, no extremity edema  Neuro - normal gait  Psych -  normal mood, behavior, speech  Musculoskeletal - grossly normal joint and motor function. Assessment/Plan:   Genoveva Hernandez is here for management of poorly controlled diabetes and weight loss. Diagnoses and all orders for this visit:    1. Uncontrolled type 2 diabetes mellitus with hyperglycemia [facility]): High risk patient. Prior A1C 13.8. Lengthy discussion with patient regarding risks of poorly controlled diabetes extending from HHS, DKA, to death. Educated patient on importance of appropriate sugar and insulin control. Initially, he was reluctant to start insulin therapy, but after our lengthy discussion, appears amenable. Will start him on the equivalent of 0.2 mg/kg of long acting insulin. Additionally, advised patient to start checking his sugars again; preferably fasting, pre-/post-prandially (2h) after a meal, and qHS.  Discussed dietary modification at length -- patient prone to consuming soft drinks daily. Will have patient meet with diabetes education. Must follow this patient closely given poorly controlled diabetes with hyperglycemia.  -     HEMOGLOBIN A1C WITH EAG; Future  -     MICROALBUMIN, UR, RAND W/ MICROALB/CREAT RATIO; Future  -     METABOLIC PANEL, BASIC; Future  -     REFERRAL TO DIABETIC EDUCATION  -     insulin glargine (LANTUS,BASAGLAR) 100 unit/mL (3 mL) inpn; 10 Units by SubCUTAneous route daily. -     alcohol swabs (Alcohol Pads) padm; 3 Canisters by Apply Externally route daily. 2. Mixed hyperlipidemia  -     LIPID PANEL; Future    3. Weight loss  -     METABOLIC PANEL, BASIC; Future  -     CBC W/O DIFF; Future      Follow up:   10/14/2022 8:40 AM Deepti Barber MD [address] [place] [address] Ctr BS AMB        Pt was discussed with Dr. Vy Benson (attending physician). I have reviewed pertinent labs results and other data. I have discussed the diagnosis with the patient and the intended plan as seen in the above orders. The patient has received an after-visit summary and questions were answered concerning future plans. I have discussed medication side effects and warnings with the patient as well.     Chloe aDvis MD  Resident [address] [place] [address]  10/07/22

## 2022-10-05 ENCOUNTER — TELEPHONE (OUTPATIENT)
Dept: FAMILY MEDICINE CLINIC | Age: 52
End: 2022-10-05

## 2022-10-05 LAB
ANION GAP SERPL CALC-SCNC: 10 MMOL/L (ref 5–15)
BUN SERPL-MCNC: 20 MG/DL (ref 6–20)
BUN/CREAT SERPL: 20 (ref 12–20)
CALCIUM SERPL-MCNC: 9.5 MG/DL (ref 8.5–10.1)
CHLORIDE SERPL-SCNC: 91 MMOL/L (ref 97–108)
CHOLEST SERPL-MCNC: 187 MG/DL
CO2 SERPL-SCNC: 27 MMOL/L (ref 21–32)
CREAT SERPL-MCNC: 0.98 MG/DL (ref 0.7–1.3)
CREAT UR-MCNC: <13 MG/DL
ERYTHROCYTE [DISTWIDTH] IN BLOOD BY AUTOMATED COUNT: 11.8 % (ref 11.5–14.5)
EST. AVERAGE GLUCOSE BLD GHB EST-MCNC: 341 MG/DL
GLUCOSE SERPL-MCNC: 581 MG/DL (ref 65–100)
HBA1C MFR BLD: 13.5 % (ref 4–5.6)
HCT VFR BLD AUTO: 38.1 % (ref 36.6–50.3)
HDLC SERPL-MCNC: 56 MG/DL
HDLC SERPL: 3.3 {RATIO} (ref 0–5)
HGB BLD-MCNC: 13.8 G/DL (ref 12.1–17)
LDLC SERPL CALC-MCNC: 96.8 MG/DL (ref 0–100)
MCH RBC QN AUTO: 34.7 PG (ref 26–34)
MCHC RBC AUTO-ENTMCNC: 36.2 G/DL (ref 30–36.5)
MCV RBC AUTO: 95.7 FL (ref 80–99)
MICROALBUMIN UR-MCNC: 1.17 MG/DL
MICROALBUMIN/CREAT UR-RTO: <90 MG/G (ref 0–30)
NRBC # BLD: 0 K/UL (ref 0–0.01)
NRBC BLD-RTO: 0 PER 100 WBC
PLATELET # BLD AUTO: 244 K/UL (ref 150–400)
PMV BLD AUTO: 10.1 FL (ref 8.9–12.9)
POTASSIUM SERPL-SCNC: 4.2 MMOL/L (ref 3.5–5.1)
RBC # BLD AUTO: 3.98 M/UL (ref 4.1–5.7)
SODIUM SERPL-SCNC: 128 MMOL/L (ref 136–145)
TRIGL SERPL-MCNC: 171 MG/DL (ref ?–150)
VLDLC SERPL CALC-MCNC: 34.2 MG/DL
WBC # BLD AUTO: 6 K/UL (ref 4.1–11.1)

## 2022-10-05 NOTE — TELEPHONE ENCOUNTER
[facility] called in regards to an Insulin medication that they got last night. They stated that they needed clarification about the medication and also the instructions. They would like to be contacted at your earliest convenience at [phone]. Thanks!

## 2022-10-07 ENCOUNTER — DOCUMENTATION ONLY (OUTPATIENT)
Dept: FAMILY MEDICINE CLINIC | Age: 52
End: 2022-10-07

## 2022-10-07 RX ORDER — INSULIN GLARGINE 100 [IU]/ML
10 INJECTION, SOLUTION SUBCUTANEOUS DAILY
Qty: 1 PEN | Refills: 0 | Status: SHIPPED | OUTPATIENT
Start: 2022-10-07 | End: 2022-10-07

## 2022-10-07 RX ORDER — ISOPROPYL ALCOHOL 70 ML/100ML
3 SWAB TOPICAL DAILY
Qty: 120 PAD | Refills: 0 | Status: SHIPPED | OUTPATIENT
Start: 2022-10-07

## 2022-10-07 RX ORDER — INSULIN GLARGINE 100 [IU]/ML
10 INJECTION, SOLUTION SUBCUTANEOUS DAILY
Qty: 5 PEN | Refills: 0 | Status: SHIPPED | OUTPATIENT
Start: 2022-10-07 | End: 2022-10-20

## 2022-10-07 NOTE — PROGRESS NOTES
Attempted to call patient at 10:14 AM on 10/7. Could not reach, so spoke to sister Bjorn Peralta who was present on his prior visit and assisting with his medical care:  - Reviewed blood work including improved, but elevated A1C along with lipid panel, BMP, urine microalb/Cr and CBC results. - Unspecified difficulty per patients pharmacy regarding Humulin. Will try switching patient over to glargine.  - Lengthy discussion with Bjorn Peralta regarding administration of glargine -- walked her through the steps of administration -- she was taking notes throughout our discussion and will relay to Mr. Melgoza Margret  - Ordered a referral to diabetes education  - Patient has been logging his sugars since our visit -- does not need refills on monitoring supplies  - Requested rescheduling patient to see me on 10/14 at 8:40 AM -- message sent to   - Patient is high risk and will need to be followed closely.   - Addressed all questions and concerns during this call    Ti Hart MD

## 2022-10-07 NOTE — PROGRESS NOTES
- A1C slightly improved from 13.8 > 13.5, still markedly above goal  - Lipid panel notable for mild hypertriglyceridemia and LDL of 96.8, above goal, but improved from prior  - BMP with hyperglycemia without elevated AG, corrected Na is 136 (normal)  - CBC okay  - urine microalbumin/Cr ratio slightly elevated.  May consider adding low dose ACEi/ARB for renal protection on subsequent visit

## 2022-10-14 ENCOUNTER — OFFICE VISIT (OUTPATIENT)
Dept: FAMILY MEDICINE CLINIC | Age: 52
End: 2022-10-14

## 2022-10-14 VITALS
TEMPERATURE: 97.3 F | HEART RATE: 94 BPM | DIASTOLIC BLOOD PRESSURE: 71 MMHG | BODY MASS INDEX: 18.77 KG/M2 | RESPIRATION RATE: 16 BRPM | SYSTOLIC BLOOD PRESSURE: 123 MMHG | HEIGHT: 62 IN | OXYGEN SATURATION: 98 % | WEIGHT: 102 LBS

## 2022-10-14 DIAGNOSIS — R60.0 BILATERAL LOWER EXTREMITY EDEMA: ICD-10-CM

## 2022-10-14 DIAGNOSIS — E11.65 UNCONTROLLED TYPE 2 DIABETES MELLITUS WITH HYPERGLYCEMIA (HCC): Primary | ICD-10-CM

## 2022-10-14 DIAGNOSIS — Z76.0 ENCOUNTER FOR MEDICATION REFILL: ICD-10-CM

## 2022-10-14 PROCEDURE — 99214 OFFICE O/P EST MOD 30 MIN: CPT

## 2022-10-14 PROCEDURE — 3046F HEMOGLOBIN A1C LEVEL >9.0%: CPT

## 2022-10-14 RX ORDER — METFORMIN HYDROCHLORIDE 500 MG/1
1500 TABLET ORAL 2 TIMES DAILY WITH MEALS
Qty: 126 TABLET | Refills: 0 | Status: SHIPPED | OUTPATIENT
Start: 2022-10-14 | End: 2022-11-04

## 2022-10-14 NOTE — PROGRESS NOTES
Yariel Pelaez is a 45 y.o. male    Chief Complaint   Patient presents with    Diabetes         1. Have you been to the ER, urgent care clinic since your last visit? Hospitalized since your last visit? No  2. Have you seen or consulted any other health care providers outside of the [address] [place] [address] since your last visit? Include any pap smears or colon screening.  No    Visit Vitals  /71 (BP 1 Location: Right arm, BP Patient Position: Sitting)   Pulse 94   Temp 97.3 °F (36.3 °C) (Temporal)   Resp 16   Ht 5' 2\" (1.575 m)   Wt 102 lb (46.3 kg)   SpO2 98%   BMI 18.66 kg/m²     3 most recent PHQ Screens 10/4/2022   Little interest or pleasure in doing things Nearly every day   Feeling down, depressed, irritable, or hopeless Several days   Total Score PHQ 2 4     Health Maintenance Due   Topic Date Due    Hepatitis C Screening  Never done    COVID-19 Vaccine (1) Never done    Pneumococcal 0-64 years (1 - PCV) Never done    Eye Exam Retinal or Dilated  Never done    Hepatitis B Vaccine (1 of 3 - Risk 3-dose series) Never done    DTaP/Tdap/Td series (1 - Tdap) Never done    Colorectal Cancer Screening Combo  Never done    Shingrix Vaccine Age 50> (1 of 2) Never done    Flu Vaccine (1) Never done

## 2022-10-14 NOTE — PROGRESS NOTES
Chief Complaint:     Chief Complaint   Patient presents with    Diabetes       Subjective:   HPI:  Sagrario Goncalves is a 45 y.o. male that presents for: follow-up on T2DM. # T2DM/LE swelling:  - Started Insulin Glargine 10u qHS on last visit  - He is on Metformin 1000 mg / day, Glipizide 10 mg daily, Actos 30 mg daily  - Blood sugars log (see image below)   - Fasting sugars 200's to 300's (he does occasionally eat crackers in the middle of the night -- so not all of these values are fasted for > 8 hours)   - Evening sugars 400's to 500's  - Patient states he is under a significant amount of stress due to work which may be contributing to his elevated sugars  - He has been compliant with the aforementioned regimen and feels markedly improved. Patient very happy about having gained 5 lb's this visit. Overall, he feels more energized and not nearly as fatigued. His only other complaint is mild to moderate LE swelling which tends to occur due to being on his feet \"all day\". - Diet: patient has eliminated soda's and zero calorie soda's, he has been trying to increase his intake of healthy fats and proteins including olive oils, legumes, protein shakes  - Improvement to polyuria and polydipsia. - No GI symptoms with increase to Metformin from 500 mg / day > 1000 mg / day. ROS:   Review of Systems   Constitutional: Negative. Negative for chills and fever. HENT: Negative. Eyes: Negative. Respiratory: Negative. Negative for cough and shortness of breath. Cardiovascular:  Positive for leg swelling. Negative for chest pain. Gastrointestinal: Negative. Negative for abdominal pain, diarrhea, nausea and vomiting. Genitourinary: Negative. Negative for frequency. Musculoskeletal: Negative. Skin: Negative. Neurological: Negative. Negative for dizziness and loss of consciousness. Endo/Heme/Allergies:  Negative for polydipsia.    All other systems reviewed and are negative. Health Maintenance:  Health Maintenance Due   Topic Date Due    Hepatitis C Screening  Never done    COVID-19 Vaccine (1) Never done    Pneumococcal 0-64 years (1 - PCV) Never done    Eye Exam Retinal or Dilated  Never done    Hepatitis B Vaccine (1 of 3 - Risk 3-dose series) Never done    DTaP/Tdap/Td series (1 - Tdap) Never done    Colorectal Cancer Screening Combo  Never done    Shingrix Vaccine Age 50> (1 of 2) Never done    Flu Vaccine (1) Never done        Past medical history, social history, and medications personally reviewed. History reviewed. No pertinent past medical history. Allergies personally reviewed. No Known Allergies       Objective:   Vitals reviewed. Visit Vitals  /71 (BP 1 Location: Right arm, BP Patient Position: Sitting)   Pulse 94   Temp 97.3 °F (36.3 °C) (Temporal)   Resp 16   Ht 5' 2\" (1.575 m)   Wt 102 lb (46.3 kg)   SpO2 98%   BMI 18.66 kg/m²        Physical Exam  General appearance - alert, thin appearing (improved from prior), and in no distress  Chest - normal chest excursion, normal inspiratory and expiratory function. Clear to ausculation bilaterally. No appreciable pitting edema to distal LE's bilaterally. Heart - normal rate, regular rhythm, normal S1, S2, no murmurs, rubs, clicks or gallops, no extremity edema  Skin - no rashes or suspicious moles  Neuro - normal gait  Musculoskeletal - grossly normal joint and motor function. Assessment/Plan:   Sagrario Goncalves is here for follow-up regarding his T2DM and acute LE swelling. Diagnoses and all orders for this visit:    1. Uncontrolled type 2 diabetes mellitus with hyperglycemia ([address]): Marked improvement of glucose values with initiation of insulin glargine 10u and after restarting Metformin. Still markedly above goal -- will gradually decrease set threshold for sugar values and lower with each subsequent visit as to avoid overburdening patient.  He will need continued encouragement as he is high risk of becoming non-compliant if not followed closely. Plan to have patient increase insulin glargine gradually by 1-2 units or 10-15% every 3-4 days along with increasing to max dose of Metformin of 2000 mg / day over the next 2 weeks. Provided patient with copies of blood sugar logs. Will see patient virtually next week to review values and adjust medications accordingly. May need to consider meal-time insulin in the future if his values do not improve. -      Insulin glargine 10u qHS > increase by 1-2u or 10-15% every 3-4 days. Blood sugar goal < 200 (will set achievable goals with eventual plan to lower set point as values improve)  -     metFORMIN (GLUCOPHAGE) 500 mg tablet; Take 3 Tablets by mouth two (2) times daily (with meals) for 21 days. Will increase to 2000 mg / day by next week and provide refill on that visit. -     REFERRAL TO OPHTHALMOLOGY  -     Mediterranean diet discussed. Stop midnight meals. 2. Bilateral lower extremity edema: Subjective. None appreciated on this examination. Likely some element of pooling / venous stasis. Advised keeping feet elevated above heart-level at night and using compression socks/stockings. Will follow closely. 3. Encounter for medication refill  -     metFORMIN (GLUCOPHAGE) 500 mg tablet; Take 3 Tablets by mouth two (2) times daily (with meals) for 21 days. Follow up: Follow-up and Dispositions    Return in about 1 week (around 10/21/2022) for may schedule virtual/phone visit for diabetes follow-up. Pt was discussed with Dr Jerman Solitario (attending physician). I have reviewed pertinent labs results and other data. I have discussed the diagnosis with the patient and the intended plan as seen in the above orders. The patient has received an after-visit summary and questions were answered concerning future plans. I have discussed medication side effects and warnings with the patient as well.     Louis Quintana MD  Resident [facility]  10/14/22

## 2022-10-14 NOTE — PATIENT INSTRUCTIONS
For the Insulin Glargine: Adjust dose: increase dose by 1 - 2 units or 10 - 15% twice weekly to achieve a goal blood sugar of < 200 both in the mornings and evenings. If hypoglycemia (which is considered a blood sugar of less than 70) occurs: decrease dose 2 - 4 units or 10 - 20%    For the Metformin: Please increase by 500 mg WEEKLY until you are taking a maximum of 2000 mg per day. So, you will start taking the equivalent of 1500 mg starting today. Please continue logging blood sugars; you may take values first thing in the morning, 2 hours after breakfast and lunch, and right before going to bed.     Continue your other Diabetes medications as prescribed      [facility] (multiple locations across [place])  [phone]    Tona Wagner MD  [facility]  [address] [place] [address]  Phone: [phone]  Fax: [phone]    [facility]  [facility] location  [facility] Department of Ophthalmology  [address]  Phone: [phone]   Fax: [phone]    [facility] location  [facility] Department of Ophthalmology   Aleksandr Santiago [address] [place] [address]  Phone: [phone]   Fax: [phone]    Ophthalmology Clinic location  [facility] Department of Ophthalmology  [address] [facility],[address]  [place] [address] [address]  Phone: [phone]  Fax: [phone]

## 2022-10-20 ENCOUNTER — VIRTUAL VISIT (OUTPATIENT)
Dept: FAMILY MEDICINE CLINIC | Age: 52
End: 2022-10-20

## 2022-10-20 ENCOUNTER — TELEPHONE (OUTPATIENT)
Dept: FAMILY MEDICINE CLINIC | Age: 52
End: 2022-10-20

## 2022-10-20 DIAGNOSIS — E11.65 UNCONTROLLED TYPE 2 DIABETES MELLITUS WITH HYPERGLYCEMIA (HCC): Primary | ICD-10-CM

## 2022-10-20 PROCEDURE — 99443 PR PHYS/QHP TELEPHONE EVALUATION 21-30 MIN: CPT

## 2022-10-20 RX ORDER — INSULIN GLARGINE 100 [IU]/ML
16 INJECTION, SOLUTION SUBCUTANEOUS DAILY
Qty: 5 PEN | Refills: 0
Start: 2022-10-20

## 2022-10-20 NOTE — PROGRESS NOTES
Nia Jenkins  45 y.o. male  1970  [address]  [id number]    [phone] (home)      [address]:    Telephone Encounter  Elvin Yanes MD       Encounter Date: 10/20/2022 at 7:53 AM    Consent: Nia Jenkins, who was seen by synchronous (real-time) audio only technology, and/or his healthcare decision maker, is aware that this patient-initiated, Telehealth encounter on 10/20/2022 is a billable service, with coverage as determined by his insurance carrier. He is aware that he may receive a bill and has provided verbal consent to proceed: Yes. Chief Complaint   Patient presents with    Follow-up     Regarding T2DM management       History of Present Illness   Nia Jenkins is a 45 y.o. male was evaluated by telephone. I communicated with the patient and/or health care decision maker about:    # T2DM:  - On Insulin Glargine 16u qHS (up from 10u last week)  - On Metformin 1500 mg / day, Glipizide 10 mg daily, Actos 30 mg daily  - Blood sugar values:    - FB-160's    - Postprandial (2h): He has not checked    - Evenin-400's, though, he usually snacks and checks these values within 45 min to 1 hour.   - Missed his evening dose of glargine one night during the week. Additionally, he took 5 tablets of Metformin (equiv to 2500 mg / day) for 2-3 days this last week, reported loose stools  - Overall, patient feels a lot better, continues to report increased strength and more energy. However, work has been a major contributor to stress -- often feels overloaded. He is self-employed and works as a contractor with several deadlines looming.   - Reports improvement to polyuria and polydipsia. No hypoglycemic symptoms.   - Diet: patient has mostly eliminated soda's and diet-soda beverages. He now consumes mostly meats, fruits, but occasionally eats crackers due to limited time to eat 2/2 to his job.    - Exercise: Mostly through his job as a contractor, no dedicated exercise otherwise    Review of Systems   Review of Systems   Constitutional: Negative. Negative for chills, diaphoresis, fever and malaise/fatigue. Respiratory:  Negative for cough and shortness of breath. Cardiovascular:  Negative for chest pain. Gastrointestinal:  Positive for diarrhea. Negative for abdominal pain, nausea and vomiting. Skin: Negative. Neurological:  Negative for loss of consciousness. Endo/Heme/Allergies:  Negative for polydipsia. Vitals/Objective:   General: Patient speaking in complete sentences without effort. Normal speech and cooperative. Due to this being a Virtual Check-in/Telephone evaluation, many elements of the physical examination are unable to be assessed. Assessment and Plan: Total Time: minutes: 21-30 minutes    44 y/o M here for uncontrolled T2DM follow-up. Diagnoses and all orders for this visit:    1. Uncontrolled type 2 diabetes mellitus with hyperglycemia [facility]): Patient gradually increasing insulin glargine dosage qHS. He was advised to take 1500 mg / day of Metformin, but took the equivalent of 2500 mg / day for the last 2-3 days. This writer had patient write down his regimen and the general plan as described below along with the recommended blood sugar goals per the ADA. Did have a brief discussion about possibly initiating a meal-time insulin for at least one meal if he remains above goal. However, his blood sugar values are continuing to decline and would like to see how the patient does over the next week accurately tracking postprandial sugars.  Patient is still high risk and will continue to follow him weekly until his values are at goal.  - Continue insulin glargine 16 units in the evenings,  increase by 1-2u or 10-15% every 3-4 days  - Continue Metformin 1500 mg / day (3 tablets) until 10/23, then increase to 2000 mg / day (4 tablets)   - Continue other diabetes medications as prescribed  - Fasting blood sugar goal: 80 - 130  - Postprandial blood sugar goal (1-2hr): < 180   - Continue checking sugars in AM, postprandially x 2 (at least), and qHS. Patient informed to follow up: next week with Dr. Zheng Bai on 10/28 or 10/27 (message sent to [employer])    I affirm this is a Patient Initiated Episode with an Established Patient who has not had a related appointment within my department in the past 7 days or scheduled within the next 24 hours. Note: not billable if this call serves to triage the patient into an appointment for the relevant concern      Electronically Signed: Edel Dawson MD  Case to be reviewed with Dr. Perico Sharp ( Attending)  Providers location when delivering service: Home      Medicare:  [address]      ICD-10-CM ICD-9-CM    1. Uncontrolled type 2 diabetes mellitus with hyperglycemia (HCC)  E11.65 250.02 insulin glargine (LANTUS,BASAGLAR) 100 unit/mL (3 mL) inpn          Pursuant to the emergency declaration under the [facility] waiver authority and the [employer] Act, this Virtual  Visit was conducted, with patient's consent, to reduce the patient's risk of exposure to COVID-19 and provide continuity of care for an established patient. History   Patients past medical, surgical and family histories were personally reviewed and updated. No past medical history on file. No past surgical history on file. No family history on file. Social History     Tobacco Use    Smoking status: Never    Smokeless tobacco: Never   Substance Use Topics    Alcohol use: Not Currently              Current Medications/Allergies   Medications and Allergies reviewed:    Current Outpatient Medications   Medication Sig Dispense Refill    insulin glargine (LANTUS,BASAGLAR) 100 unit/mL (3 mL) inpn 16 Units by SubCUTAneous route daily.  5 Pen 0    metFORMIN (GLUCOPHAGE) 500 mg tablet Take 3 Tablets by mouth two (2) times daily (with meals) for 21 days. 126 Tablet 0    alcohol swabs (Alcohol Pads) padm 3 Canisters by Apply Externally route daily. 120 Pad 0    glipiZIDE SR (GLUCOTROL XL) 10 mg CR tablet Take 1 Tablet by mouth daily. 90 Tablet 3    pioglitazone (ACTOS) 30 mg tablet Take 1 Tablet by mouth daily. 90 Tablet 1    atorvastatin (LIPITOR) 40 mg tablet Take 1 Tab by mouth daily.  90 Tab 2    Blood-Glucose Meter monitoring kit Use 4x per day to check sugar 1 Kit 0    glucose blood VI test strips (BLOOD GLUCOSE TEST) strip Use to check sugar 4 times per day 120 Strip 2    lancets misc Use to check sugar 4 times per day 1 Each 11     No Known Allergies

## 2022-10-21 NOTE — PROGRESS NOTES
[address] Medicine Residency Attending Addendum:  Dr. Lavelle Lacey MD,  the patient and I were not physically present during this encounter. The resident and I are concurrently monitoring the patient care through appropriate telecommunication technology. I discussed the findings, assessment and plan with the resident and agree with the resident's findings and plan as documented in the resident's note.       Sam Short MD

## 2022-11-21 ENCOUNTER — OFFICE VISIT (OUTPATIENT)
Dept: FAMILY MEDICINE CLINIC | Age: 52
End: 2022-11-21

## 2022-11-21 VITALS
OXYGEN SATURATION: 99 % | SYSTOLIC BLOOD PRESSURE: 108 MMHG | WEIGHT: 97.2 LBS | TEMPERATURE: 97.9 F | HEART RATE: 91 BPM | RESPIRATION RATE: 16 BRPM | DIASTOLIC BLOOD PRESSURE: 71 MMHG | HEIGHT: 62 IN | BODY MASS INDEX: 17.89 KG/M2

## 2022-11-21 DIAGNOSIS — M76.32 IT BAND SYNDROME, LEFT: ICD-10-CM

## 2022-11-21 DIAGNOSIS — R63.4 WEIGHT LOSS: ICD-10-CM

## 2022-11-21 DIAGNOSIS — E11.65 UNCONTROLLED TYPE 2 DIABETES MELLITUS WITH HYPERGLYCEMIA (HCC): Primary | ICD-10-CM

## 2022-11-21 PROCEDURE — 99214 OFFICE O/P EST MOD 30 MIN: CPT

## 2022-11-21 PROCEDURE — 3046F HEMOGLOBIN A1C LEVEL >9.0%: CPT

## 2022-11-21 NOTE — PROGRESS NOTES
Identified pt with two pt identifiers(name and ). Reviewed record in preparation for visit and have obtained necessary documentation. Chief Complaint   Patient presents with    Diabetes    Leg Pain     Patient states it have been progressing for the last year but the pain have increased and he is taking aspirin like candy, bilateral leg pain        Health Maintenance Due   Topic    Hepatitis C Screening     COVID-19 Vaccine (1)    Pneumococcal 0-64 years (1 - PCV)    Eye Exam Retinal or Dilated     Hepatitis B Vaccine (1 of 3 - Risk 3-dose series)    DTaP/Tdap/Td series (1 - Tdap)    Colorectal Cancer Screening Combo     Shingrix Vaccine Age 50> (1 of 2)    Flu Vaccine (1)       Visit Vitals  /71 (BP 1 Location: Right upper arm, BP Patient Position: Sitting, BP Cuff Size: Adult)   Pulse 91   Temp 97.9 °F (36.6 °C) (Oral)   Resp 16   Ht 5' 2\" (1.575 m)   Wt 97 lb 3.2 oz (44.1 kg)   SpO2 99%   BMI 17.78 kg/m²         Coordination of Care Questionnaire:  :   1) Have you been to an emergency room, urgent care, or hospitalized since your last visit? If yes, where when, and reason for visit? no       2. Have seen or consulted any other health care provider since your last visit? If yes, where when, and reason for visit? NO      Patient is accompanied by self I have received verbal consent from Stacia Jaramillo to discuss any/all medical information while they are present in the room.

## 2022-11-21 NOTE — PROGRESS NOTES
Chief Complaint/Subjective:   HPI:  Nia Jenkins is a 45 y.o. male that presents for:   Chief Complaint   Patient presents with    Diabetes    Leg Pain     Patient states it have been progressing for the last year but the pain have increased and he is taking aspirin like candy, bilateral leg pain     # T2DM:  - On Insulin Glargine 16u qHS  - On Metformin 1500 mg / day, Glipizide 10 mg daily, Actos 30 mg daily  - Blood sugar values:    - FB-100's, 150 this AM    - Postprandial (2h): 200 to 250    - Evenin's  - Diet: Patient has limited consumption of diet sodas and sodas. Drinks mostly water sometimes with an \"energy shot\". Chili's, stews, soups, hamburgers. 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 No significant GI symptoms. # LLE Pain :  - Onset: Chronic, 1 year ago  - Context: Does not recall exact mechanism of how it started. 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 Foot strikes tend to cause the discomfort to come on.   - Location: LLE, extending from IT band to L knee and down to L ankle  - Timing: Intermittent  - Quality: \"achy\" and \"burning at times\"  - Associated Symptoms: No associated weakness, sensory changes, back pain extending down to the distal LE  - Mitigating factors:     - Exacerbated by: exertion     - Improved by: Aspirin, Icy Hot patches  - Severity: 0/10, no pain currently    ROS:   Review of Systems   Constitutional: Negative. Negative for chills and fever. Respiratory: Negative. Negative for cough and shortness of breath. Cardiovascular: Negative. Negative for chest pain, orthopnea, leg swelling and PND. Gastrointestinal: Negative. Negative for abdominal pain, diarrhea, nausea and vomiting. Musculoskeletal:         + Left leg pain   Endo/Heme/Allergies:  Positive for polydipsia.    All other systems reviewed and are negative. Health Maintenance:  Health Maintenance Due   Topic Date Due    Hepatitis C Screening  Never done    COVID-19 Vaccine (1) Never done    Pneumococcal 0-64 years (1 - PCV) Never done    Eye Exam Retinal or Dilated  Never done    Hepatitis B Vaccine (1 of 3 - Risk 3-dose series) Never done    DTaP/Tdap/Td series (1 - Tdap) Never done    Colorectal Cancer Screening Combo  Never done    Shingrix Vaccine Age 50> (1 of 2) Never done    Flu Vaccine (1) Never done        Past medical history, social history, and medications personally reviewed. History reviewed. No pertinent past medical history. Allergies personally reviewed. No Known Allergies       Objective:   Vitals reviewed. Visit Vitals  /71 (BP 1 Location: Right upper arm, BP Patient Position: Sitting, BP Cuff Size: Adult)   Pulse 91   Temp 97.9 °F (36.6 °C) (Oral)   Resp 16   Ht 5' 2\" (1.575 m)   Wt 97 lb 3.2 oz (44.1 kg)   SpO2 99%   BMI 17.78 kg/m²        Physical Exam  General appearance - alert, thin/cachectic appearing, and in no distress  Mouth - mucous membranes moist, pharynx normal without lesions  Neck - supple, no significant adenopathy  Chest - normal chest excursion, normal inspiratory and expiratory function. Clear to ausculation bilaterally. Heart - normal rate, regular rhythm, normal S1, S2, no murmurs, rubs, clicks or gallops, no extremity edema  Abdomen- benign, no organomegaly or masses  Neuro - normal gait  Musculoskeletal - Tender along distribution of IT band to the L proximal lower extremity with motor sensation and strength intact. No obvious deformities to inspection of L patella. No significant tenderness to L hip or pelvic region. Unable to elicit similar symptoms to RLE. Assessment/Plan:   Maria M Greer is here for follow-up on T2DM and LLE pain. Diagnoses and all orders for this visit:    1.  Uncontrolled type 2 diabetes mellitus with hyperglycemia [facility]): Improving sugars -- at goal with regards to fasting levels. Remains slightly above goal for postprandial values. Patient would like to hold off on increasing his PO medications due to pill burden. Additionally, he declines adding a single meal-time insulin dose to his regimen, despite counseling. Patient reports occupation as a major stressor causing him to have a \"poor appetite\". His weight loss is concerning -- however, patient reports to be compliant with his aforementioned medications -- has not missed any doses recently. Will need to follow this patient closely given his non-fasting sugars remain above goal with a drop in his weight of 5 lbs. Patient reports that he is actively seeking another, less stressful job and may even apply for short term disability, but plans to investigate this further. Of note, patient missed prior appt with diabetes education -- will place referral again.   -     REFERRAL TO DIABETIC EDUCATION    2. It band syndrome, left: Symptoms and findings appear most c/w IT band syndrome, though, suspect patient may have mild degree of OA to the knee. Will try conservative measures including NSAIDs/Tylenol -- instructions provided with AVS. Also referred to [facility] to eval + treat. Patient given a handout for IT band exercises/stretches to try at home. 3. Weight loss: As above. Follow up: Follow-up and Dispositions    Return in about 2 weeks (around 12/5/2022) for for diabetes follow-up, may be virtual visit. Pt was discussed with Dr Bhargavi Joe (attending physician). I have reviewed pertinent labs results and other data. I have discussed the diagnosis with the patient and the intended plan as seen in the above orders. The patient has received an after-visit summary and questions were answered concerning future plans. I have discussed medication side effects and warnings with the patient as well.     Hosea Estrada MD  Resident [facility]  11/21/22

## 2022-11-21 NOTE — PATIENT INSTRUCTIONS
NSAIDs + acetaminophen work together to reduce inflammation and can control pain better than opioid derived medications. Take 1-3 tabs of 200mg ibuprofen (motrin, advil) + 500mg acetaminophen (Tylenol) every 6 hours for the next 5 days. Then take as needed for pain. Do eat food before taking NSAIDs (ibuprofen, naproxen). Do not take more than 3200mg of ibuprofen or 3000mg acetaminophen daily. Do not take this regimen for more than 2 weeks without discussion with your doctor.

## 2023-01-06 ENCOUNTER — OFFICE VISIT (OUTPATIENT)
Dept: FAMILY MEDICINE CLINIC | Age: 53
End: 2023-01-06

## 2023-01-06 VITALS
HEIGHT: 62 IN | WEIGHT: 87 LBS | BODY MASS INDEX: 16.01 KG/M2 | RESPIRATION RATE: 17 BRPM | HEART RATE: 97 BPM | OXYGEN SATURATION: 100 % | TEMPERATURE: 97.5 F | DIASTOLIC BLOOD PRESSURE: 62 MMHG | SYSTOLIC BLOOD PRESSURE: 98 MMHG

## 2023-01-06 DIAGNOSIS — Z76.0 ENCOUNTER FOR MEDICATION REFILL: ICD-10-CM

## 2023-01-06 DIAGNOSIS — R63.4 WEIGHT LOSS, UNINTENTIONAL: ICD-10-CM

## 2023-01-06 DIAGNOSIS — E11.65 UNCONTROLLED TYPE 2 DIABETES MELLITUS WITH HYPERGLYCEMIA (HCC): Primary | ICD-10-CM

## 2023-01-06 LAB — GLUCOSE DOSE-GTT, POCT, GLDSPOCT: 287

## 2023-01-06 PROCEDURE — 3052F HG A1C>EQUAL 8.0%<EQUAL 9.0%: CPT

## 2023-01-06 PROCEDURE — 99214 OFFICE O/P EST MOD 30 MIN: CPT

## 2023-01-06 PROCEDURE — 82950 GLUCOSE TEST: CPT

## 2023-01-06 RX ORDER — METFORMIN HYDROCHLORIDE 1000 MG/1
2000 TABLET ORAL 2 TIMES DAILY WITH MEALS
Qty: 120 TABLET | Refills: 0 | Status: SHIPPED | OUTPATIENT
Start: 2023-01-06 | End: 2023-02-05

## 2023-01-06 RX ORDER — NAPROXEN SODIUM 220 MG
220 TABLET ORAL
COMMUNITY

## 2023-01-06 NOTE — PROGRESS NOTES
Chief Complaint   Patient presents with    Diabetes    Hospital Follow Up     1. Have you been to the ER, urgent care clinic since your last visit? Hospitalized since your last visit? Yes. [facility] 1/2/23 for weakness. 2. Have you seen or consulted any other health care providers outside of the [address] [place] [address] since your last visit? Include any pap smears or colon screening.  No

## 2023-01-06 NOTE — PROGRESS NOTES
Chief Complaint/Subjective:   HPI:  Spencer Parent is a 45 y.o. male that presents for:   Chief Complaint   Patient presents with    Diabetes    Hospital Follow Up       # T2DM:  - On Insulin Glargine 16u qHS (reports to having missed \"a few doses\")  - On Metformin 1500 mg / day, Glipizide 10 mg daily, Actos 30 mg daily (missed Glipizide and Metformin doses several times since last office visit)  - Blood sugar values:    - FBG: below 100's     - Postprandial (2h): is not checking    - Evening: highest value 250-275  - Diet: Bowl of oatmeal in AM, applesauce and toast. Patient has limited consumption of diet sodas and sodas. Drinks mostly water sometimes with an \"energy shot\". No longer eating chili's, stews, soups, hamburgers. - Exercise: Retired from his job due to increasing \"weakness\"  - Needs Metformin refill    # Hospital Follow-up:   - Patient went to [facility] ED on 1/2/23 for evaluation of progressively worsening fatigue and weakness. He had a complete work-up done including ACS r/o which was negative. Sugars were found to be elevated without evidence of DKA or HHS (VBG with normal pH). Found to have bladder distention of abdominal CT with a referral given to urology. ROS:   Review of Systems   Constitutional:  Positive for malaise/fatigue and weight loss. Negative for chills and fever. Respiratory: Negative. Negative for cough and shortness of breath. Cardiovascular: Negative. Negative for chest pain. Gastrointestinal: Negative. Negative for abdominal pain, diarrhea, nausea and vomiting. Endo/Heme/Allergies:  Positive for polydipsia. Psychiatric/Behavioral:  Positive for depression. Negative for suicidal ideas.       Health Maintenance:  Health Maintenance Due   Topic Date Due    Hepatitis C Screening  Never done    COVID-19 Vaccine (1) Never done    Pneumococcal 0-64 years (1 - PCV) Never done    Eye Exam Retinal or Dilated  Never done    Hepatitis B Vaccine (1 of 3 - Risk 3-dose series) Never done    DTaP/Tdap/Td series (1 - Tdap) Never done    Colorectal Cancer Screening Combo  Never done    Shingles Vaccine (1 of 2) Never done    Flu Vaccine (1) Never done    Foot Exam Q1  02/08/2023        Past medical history, social history, and medications personally reviewed. History reviewed. No pertinent past medical history. Allergies personally reviewed. No Known Allergies       Objective:   Vitals reviewed. Visit Vitals  BP 98/62   Pulse 97   Temp 97.5 °F (36.4 °C) (Temporal)   Resp 17   Ht 5' 2\" (1.575 m)   Wt 87 lb (39.5 kg)   SpO2 100%   BMI 15.91 kg/m²        Physical Exam  General appearance - alert, cachectic appearing  Mouth - mucous membranes moist, pharynx normal without lesions  Neck - supple, no significant adenopathy  Chest - normal chest excursion, normal inspiratory and expiratory function. Clear to ausculation bilaterally. Heart - normal rate, regular rhythm, normal S1, S2, no murmurs, rubs, clicks or gallops, no extremity edema  Musculoskeletal - grossly normal joint and motor function. Assessment/Plan:   Cele Montano is here for T2DM and ED visit follow-up. Diagnoses and all orders for this visit:    1. Uncontrolled type 2 diabetes mellitus with hyperglycemia ([facility] [address]): POC Glucose test 287. Diabetes poorly controlled per last A1C from 10/2022 of 13.5. Patient with questionable compliance with home regimen with notable weight loss in the interval since last office visit. No bouts of overt hypoglycemia, though, patient has not been checking sugars routinely as previously advised. Recently went to [facility] ED for weakness and found to have urinary retention that improved independently, but given follow-up to see urology. Additionally, noted to have elevated blood sugars up to 200's; he was also given a referral to see an endocrinologist -- due to see on 2/1/2023, cannot recall provider name.  Regarding his regimen, discussed importance of remaining compliant -- will keep current regimen and increase metformin to max dosage of 2g per day. Expect to follow patient on  a weekly basis until he establishes with endo. Reviewed dietary and lifestyle recommendations again with patient prior to discharge.   -     HEMOGLOBIN A1C WITH EAG; Future  -     AMB POC GLUCOSE TEST  -     COLLECTION CAPILLARY BLOOD SPECIMEN  -     metFORMIN (GLUCOPHAGE) 1,000 mg tablet; Take 2 Tablets by mouth two (2) times daily (with meals) for 30 days. 2. Weight loss, unintentional: 10 lb weight loss since last visit on 11/21/2022. No obvious etiology -- presents at a lower weight than his first visit with me in 10/2022. Ddx includes depression from losing his job, poor compliance with diabetes medications, vs malignancy. He has not had a colonoscopy, though, per blood work lacks evidence of any anemia. He reports to not smoking. No evidence of any lesions identified on CXR or CT Abd performed at [facility]. He remains self-pay which limits ability to complete routine age-appropriate malignancy screening. Highly favor psychological status as etiology for weight loss, but must rule out other more ominous etiologies. Patient to apply for Medicaid. Will proceed with referrals on next visit. 3. Encounter for medication refill  -     metFORMIN (GLUCOPHAGE) 1,000 mg tablet; Take 2 Tablets by mouth two (2) times daily (with meals) for 30 days. Follow up: Follow-up and Dispositions    Return in about 1 week (around 1/13/2023) for type 2 diabetes follow-up (40 min visit) with Dr. Alfonso Hanks. Pt was discussed with Dr Lisa Oleary (attending physician). I have reviewed pertinent labs results and other data. I have discussed the diagnosis with the patient and the intended plan as seen in the above orders. The patient has received an after-visit summary and questions were answered concerning future plans. I have discussed medication side effects and warnings with the patient as well.     Negar Cerda, MD  Resident [facility]  1/6/23

## 2023-01-07 LAB
EST. AVERAGE GLUCOSE BLD GHB EST-MCNC: 189 MG/DL
HBA1C MFR BLD: 8.2 % (ref 4–5.6)

## 2023-01-10 ENCOUNTER — TELEPHONE (OUTPATIENT)
Dept: FAMILY MEDICINE CLINIC | Age: 53
End: 2023-01-10

## 2023-01-10 NOTE — TELEPHONE ENCOUNTER
Spoke to Sister -- Ms. Jimmy Alvarado on 1/6/2023 at ~ 4:24 PM. Permission obtained from patient prior to call. Purpose of call: to express concern regarding Mr. Rika Walter recently weight loss, to discuss compliance with home medications, and to review regimen along with addressing any other questions/concerns.     - Briefly reviewed patient's health with [facility] during call. Expressed concern regarding 10 lb weight loss and Mr. Nadege Howard recently being forced to retire. - She informed me that as far as she knows, Mr. Nadege Howard has been compliant with his home medications, but because they live in separate houses, she cannot be certain. - Informed [facility] that the weight loss could be 2/2 to his poorly controlled diabetes and lack of medication compliance vs mental health related (due to recent job loss/halfway) vs possibility of malignancy. Patient, unfortunately, remains self-pay and on multiple occasions has expressed that he won't qualify for Medicaid due to owning a home. - Informed patient and sister that this may not be the case and that he should nevertheless apply for Medicaid which [facility] agreed with [address], who is a primary support for Mr. Nadege Howard, that he needs to be closely monitored to make sure he is compliant with home medications. - On labs/studies performed at [facility] earlier this month, no evidence of obvious malignancy -- no anemia or hypercalcemia, no over findings on CXR. However, patient has never had a colonoscopy and informed both patient+sister that a lack of abnormal findings does not conclusively rule out malignancy. - Sister expressed general understanding. She requested I review Mr. Rika Walter home insulin+PO med regimen which she also wrote down. Plan to follow patient weekly in the interval until he sees endocrine at [facility] on 2/1/2023.      Hosea Estrada MD

## 2023-02-03 DIAGNOSIS — E11.65 UNCONTROLLED TYPE 2 DIABETES MELLITUS WITH HYPERGLYCEMIA (HCC): ICD-10-CM

## 2023-02-04 RX ORDER — INSULIN GLARGINE 100 [IU]/ML
16 INJECTION, SOLUTION SUBCUTANEOUS DAILY
Qty: 5 PEN | Refills: 0 | Status: SHIPPED | OUTPATIENT
Start: 2023-02-04

## 2023-06-15 PROBLEM — E11.65 UNCONTROLLED TYPE 2 DIABETES MELLITUS WITH HYPERGLYCEMIA (HCC): Status: RESOLVED | Noted: 2021-03-03 | Resolved: 2023-06-15

## 2023-06-15 PROBLEM — E10.9 TYPE 1 DIABETES MELLITUS WITHOUT COMPLICATION (HCC): Status: ACTIVE | Noted: 2023-06-15

## 2023-06-30 ENCOUNTER — OFFICE VISIT (OUTPATIENT)
Age: 53
End: 2023-06-30

## 2023-06-30 VITALS
OXYGEN SATURATION: 98 % | TEMPERATURE: 98.4 F | SYSTOLIC BLOOD PRESSURE: 99 MMHG | BODY MASS INDEX: 15.83 KG/M2 | HEIGHT: 62 IN | WEIGHT: 86 LBS | DIASTOLIC BLOOD PRESSURE: 64 MMHG | HEART RATE: 103 BPM

## 2023-06-30 DIAGNOSIS — E13.9 LATENT AUTOIMMUNE DIABETES IN ADULTS (LADA), MANAGED AS TYPE 1 (HCC): Primary | ICD-10-CM

## 2023-06-30 LAB — GLUCOSE DOSE-GTT, POC: 295

## 2023-06-30 PROCEDURE — 99214 OFFICE O/P EST MOD 30 MIN: CPT

## 2023-06-30 PROCEDURE — 3052F HG A1C>EQUAL 8.0%<EQUAL 9.0%: CPT

## 2023-06-30 PROCEDURE — 82950 GLUCOSE TEST: CPT

## 2023-06-30 PROCEDURE — PBSHW AMB POC GLUCOSE TEST

## 2023-06-30 ASSESSMENT — PATIENT HEALTH QUESTIONNAIRE - PHQ9
1. LITTLE INTEREST OR PLEASURE IN DOING THINGS: 0
SUM OF ALL RESPONSES TO PHQ9 QUESTIONS 1 & 2: 0
SUM OF ALL RESPONSES TO PHQ QUESTIONS 1-9: 0
2. FEELING DOWN, DEPRESSED OR HOPELESS: 0
SUM OF ALL RESPONSES TO PHQ QUESTIONS 1-9: 0

## 2023-07-02 PROBLEM — E13.9 LATENT AUTOIMMUNE DIABETES IN ADULTS (LADA), MANAGED AS TYPE 1 (HCC): Status: ACTIVE | Noted: 2023-07-02

## 2024-03-19 ENCOUNTER — OFFICE VISIT (OUTPATIENT)
Age: 54
End: 2024-03-19

## 2024-03-19 VITALS
TEMPERATURE: 98.2 F | DIASTOLIC BLOOD PRESSURE: 70 MMHG | WEIGHT: 93 LBS | SYSTOLIC BLOOD PRESSURE: 118 MMHG | OXYGEN SATURATION: 98 % | RESPIRATION RATE: 16 BRPM | BODY MASS INDEX: 17.11 KG/M2 | HEART RATE: 92 BPM | HEIGHT: 62 IN

## 2024-03-19 DIAGNOSIS — Z76.0 MEDICATION REFILL: ICD-10-CM

## 2024-03-19 DIAGNOSIS — N39.43 BENIGN PROSTATIC HYPERPLASIA WITH POST-VOID DRIBBLING: ICD-10-CM

## 2024-03-19 DIAGNOSIS — N40.1 BENIGN PROSTATIC HYPERPLASIA WITH POST-VOID DRIBBLING: ICD-10-CM

## 2024-03-19 DIAGNOSIS — R35.1 FREQUENT URINATION AT NIGHT: ICD-10-CM

## 2024-03-19 DIAGNOSIS — E13.9 LATENT AUTOIMMUNE DIABETES IN ADULTS (LADA), MANAGED AS TYPE 1 (HCC): Primary | ICD-10-CM

## 2024-03-19 PROCEDURE — 99214 OFFICE O/P EST MOD 30 MIN: CPT

## 2024-03-19 RX ORDER — INSULIN ASPART 100 [IU]/ML
INJECTION, SOLUTION INTRAVENOUS; SUBCUTANEOUS
Qty: 5 ADJUSTABLE DOSE PRE-FILLED PEN SYRINGE | Refills: 2 | Status: SHIPPED | OUTPATIENT
Start: 2024-03-19

## 2024-03-19 RX ORDER — TAMSULOSIN HYDROCHLORIDE 0.4 MG/1
0.4 CAPSULE ORAL DAILY
Qty: 30 CAPSULE | Refills: 5 | Status: SHIPPED | OUTPATIENT
Start: 2024-03-19

## 2024-03-19 RX ORDER — INSULIN GLARGINE 100 [IU]/ML
10 INJECTION, SOLUTION SUBCUTANEOUS DAILY
Qty: 5 ADJUSTABLE DOSE PRE-FILLED PEN SYRINGE | Refills: 2 | Status: SHIPPED | OUTPATIENT
Start: 2024-03-19

## 2024-03-19 ASSESSMENT — PATIENT HEALTH QUESTIONNAIRE - PHQ9
SUM OF ALL RESPONSES TO PHQ QUESTIONS 1-9: 0
1. LITTLE INTEREST OR PLEASURE IN DOING THINGS: NOT AT ALL
SUM OF ALL RESPONSES TO PHQ QUESTIONS 1-9: 0
SUM OF ALL RESPONSES TO PHQ9 QUESTIONS 1 & 2: 0
SUM OF ALL RESPONSES TO PHQ QUESTIONS 1-9: 0
2. FEELING DOWN, DEPRESSED OR HOPELESS: NOT AT ALL
SUM OF ALL RESPONSES TO PHQ QUESTIONS 1-9: 0

## 2024-03-19 NOTE — PROGRESS NOTES
I reviewed with the resident the medical history and the resident's findings on the physical examination.  I discussed with the resident the patient's diagnosis and concur with the plan.    
Identified pt with two pt identifiers(name and ). Reviewed record in preparation for visit and have obtained necessary documentation.  Chief Complaint   Patient presents with    Incontinence    Sees endo but unsure name ( last 3 months ago) - thinks A1c 12    Incontinence issues mostly at night or if he can't get to restroom    Doesn't check sugars often     Health Maintenance Due   Topic    Hepatitis B vaccine (1 of 3 - 3-dose series)    COVID-19 Vaccine (1)    Pneumococcal 0-64 years Vaccine (1 of 2 - PCV)    HIV screen     Diabetic retinal exam     Hepatitis C screen     DTaP/Tdap/Td vaccine (1 - Tdap)    Colorectal Cancer Screen     Shingles vaccine (1 of 2)    Diabetic foot exam     Flu vaccine (1)    A1C test (Diabetic or Prediabetic)     Diabetic Alb to Cr ratio (uACR) test     Lipids     GFR test (Diabetes, CKD 3-4, OR last GFR 15-59)        Vitals:    24 1157   BP: 118/70   Site: Left Upper Arm   Position: Sitting   Cuff Size: Small Adult   Pulse: 92   Resp: 16   Temp: 98.2 °F (36.8 °C)   TempSrc: Temporal   SpO2: 98%   Weight: 42.2 kg (93 lb)   Height: 1.575 m (5' 2\")           Coordination of Care Questionnaire:  :   1. Have you been to the ER, urgent care clinic since your last visit?  Hospitalized since your last visit?No    2. Have you seen or consulted any other health care providers outside of the [facility] since your last visit?  Include any pap smears or colon screening. No    This patient is accompanied in the office by his self.  I have received verbal consent from Steve Martinez to discuss any/all medical information while they are present in the room.  
History of weight loss     Neuropathy     Uncontrolled type 2 diabetes mellitus with hyperglycemia (HCC) 3/3/2021       Allergies personally reviewed.  No Known Allergies       Objective:   Vitals reviewed.  /70 (Site: Left Upper Arm, Position: Sitting, Cuff Size: Small Adult)   Pulse 92   Temp 98.2 °F (36.8 °C) (Temporal)   Resp 16   Ht 1.575 m (5' 2\")   Wt 42.2 kg (93 lb)   SpO2 98%   BMI 17.01 kg/m²      Physical Exam  General appearance - alert, thing appearing, and in no distress  Chest - normal chest excursion, normal inspiratory and expiratory function.  Clear to ausculation bilaterally.    Heart - normal rate, regular rhythm, normal S1, S2, no murmurs, rubs, clicks or gallops, no extremity edema      Assessment/Plan:   Steve was seen today for incontinence.    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 Chronic. Followed by endocrinologist. Last A1c about 12.0 almost 2 months ago. Expected to see endocrinologist in 1 month. Needs refill of home meds. Still does not have insurance -- declining referrals and labs for now. Offered SW services, but pt declined.   -     insulin glargine (LANTUS SOLOSTAR) 100 UNIT/ML injection pen; Inject 10 Units into the skin daily  -     insulin aspart (NOVOLOG FLEXPEN) 100 UNIT/ML injection pen; 4 Units in the morning and at bedtime Takes before lunch and dinner    Frequent urination at night: Chronic. Glucosuria on Ddx -- will obtain POC UA. Symptoms appear more c/w BPH given frequent nocturia. Will trial flomax. Pt aware he must monitor his BP's very closely as he has been borderline hypotensive.   -     tamsulosin (FLOMAX) 0.4 MG capsule; Take 1 capsule by mouth daily  -     AMB POC URINALYSIS DIP STICK AUTO W/O MICRO    Benign prostatic hyperplasia with post-void dribbling: As above.   -     tamsulosin (FLOMAX) 0.4 MG capsule; Take 1 capsule by mouth

## 2024-07-11 ENCOUNTER — APPOINTMENT (OUTPATIENT)
Facility: HOSPITAL | Age: 54
DRG: 871 | End: 2024-07-11

## 2024-07-11 ENCOUNTER — HOSPITAL ENCOUNTER (INPATIENT)
Facility: HOSPITAL | Age: 54
LOS: 9 days | Discharge: ANOTHER HOSPITAL | DRG: 871 | End: 2024-07-20
Attending: STUDENT IN AN ORGANIZED HEALTH CARE EDUCATION/TRAINING PROGRAM | Admitting: INTERNAL MEDICINE

## 2024-07-11 DIAGNOSIS — E87.20 METABOLIC ACIDOSIS: ICD-10-CM

## 2024-07-11 DIAGNOSIS — E10.10 DIABETIC KETOACIDOSIS WITHOUT COMA ASSOCIATED WITH TYPE 1 DIABETES MELLITUS (HCC): ICD-10-CM

## 2024-07-11 DIAGNOSIS — R33.9 URINARY RETENTION: ICD-10-CM

## 2024-07-11 DIAGNOSIS — R62.7 FAILURE TO THRIVE IN ADULT: ICD-10-CM

## 2024-07-11 DIAGNOSIS — A41.9 SEPTICEMIA (HCC): ICD-10-CM

## 2024-07-11 DIAGNOSIS — A41.9 SEVERE SEPSIS (HCC): Primary | ICD-10-CM

## 2024-07-11 DIAGNOSIS — R65.20 SEVERE SEPSIS (HCC): Primary | ICD-10-CM

## 2024-07-11 DIAGNOSIS — N17.9 AKI (ACUTE KIDNEY INJURY) (HCC): ICD-10-CM

## 2024-07-11 DIAGNOSIS — E87.1 HYPONATREMIA: ICD-10-CM

## 2024-07-11 PROBLEM — E10.9 TYPE 1 DIABETES MELLITUS WITHOUT COMPLICATION (HCC): Status: RESOLVED | Noted: 2023-06-15 | Resolved: 2024-07-11

## 2024-07-11 PROBLEM — E11.10 KETOACIDOSIS, DIABETIC, TYPE 2, NO COMA (HCC): Status: RESOLVED | Noted: 2024-07-11 | Resolved: 2024-07-11

## 2024-07-11 PROBLEM — E11.10 KETOACIDOSIS, DIABETIC, TYPE 2, NO COMA (HCC): Status: ACTIVE | Noted: 2024-07-11

## 2024-07-11 LAB
ALBUMIN SERPL-MCNC: 2.9 G/DL (ref 3.5–5)
ALBUMIN/GLOB SERPL: 0.6 (ref 1.1–2.2)
ALP SERPL-CCNC: 138 U/L (ref 45–117)
ALT SERPL-CCNC: 20 U/L (ref 12–78)
AMPHET UR QL SCN: NEGATIVE
ANION GAP BLD CALC-SCNC: ABNORMAL (ref 10–20)
ANION GAP SERPL CALC-SCNC: 17 MMOL/L (ref 5–15)
ANION GAP SERPL CALC-SCNC: 30 MMOL/L (ref 5–15)
APPEARANCE UR: ABNORMAL
AST SERPL-CCNC: 24 U/L (ref 15–37)
BACTERIA URNS QL MICRO: ABNORMAL /HPF
BARBITURATES UR QL SCN: NEGATIVE
BASE DEFICIT BLD-SCNC: 19.1 MMOL/L
BASOPHILS # BLD: 0 K/UL (ref 0–0.1)
BASOPHILS NFR BLD: 0 % (ref 0–1)
BENZODIAZ UR QL: NEGATIVE
BILIRUB SERPL-MCNC: 0.8 MG/DL (ref 0.2–1)
BILIRUB UR QL: NEGATIVE
BUN SERPL-MCNC: 35 MG/DL (ref 6–20)
BUN SERPL-MCNC: 39 MG/DL (ref 6–20)
BUN/CREAT SERPL: 17 (ref 12–20)
BUN/CREAT SERPL: 19 (ref 12–20)
CA-I BLD-MCNC: 1.12 MMOL/L (ref 1.12–1.32)
CALCIUM SERPL-MCNC: 8.8 MG/DL (ref 8.5–10.1)
CALCIUM SERPL-MCNC: 9.9 MG/DL (ref 8.5–10.1)
CANNABINOIDS UR QL SCN: NEGATIVE
CHLORIDE BLD-SCNC: 94 MMOL/L (ref 100–108)
CHLORIDE SERPL-SCNC: 100 MMOL/L (ref 97–108)
CHLORIDE SERPL-SCNC: 86 MMOL/L (ref 97–108)
CK SERPL-CCNC: 68 U/L (ref 39–308)
CO2 BLD-SCNC: <5 MMOL/L (ref 19–24)
CO2 SERPL-SCNC: 16 MMOL/L (ref 21–32)
CO2 SERPL-SCNC: 6 MMOL/L (ref 21–32)
COCAINE UR QL SCN: NEGATIVE
COLOR UR: ABNORMAL
CREAT SERPL-MCNC: 1.86 MG/DL (ref 0.7–1.3)
CREAT SERPL-MCNC: 2.34 MG/DL (ref 0.7–1.3)
CREAT UR-MCNC: 1.7 MG/DL (ref 0.6–1.3)
DIFFERENTIAL METHOD BLD: ABNORMAL
EOSINOPHIL # BLD: 0 K/UL (ref 0–0.4)
EOSINOPHIL NFR BLD: 0 % (ref 0–7)
EPITH CASTS URNS QL MICRO: ABNORMAL /LPF
ERYTHROCYTE [DISTWIDTH] IN BLOOD BY AUTOMATED COUNT: 12.3 % (ref 11.5–14.5)
GLOBULIN SER CALC-MCNC: 5.1 G/DL (ref 2–4)
GLUCOSE BLD STRIP.AUTO-MCNC: 198 MG/DL (ref 65–117)
GLUCOSE BLD STRIP.AUTO-MCNC: 202 MG/DL (ref 65–117)
GLUCOSE BLD STRIP.AUTO-MCNC: 206 MG/DL (ref 65–117)
GLUCOSE BLD STRIP.AUTO-MCNC: 216 MG/DL (ref 65–117)
GLUCOSE BLD STRIP.AUTO-MCNC: 249 MG/DL (ref 65–117)
GLUCOSE BLD STRIP.AUTO-MCNC: 341 MG/DL (ref 65–117)
GLUCOSE BLD STRIP.AUTO-MCNC: 403 MG/DL (ref 65–117)
GLUCOSE BLD STRIP.AUTO-MCNC: 564 MG/DL (ref 74–99)
GLUCOSE SERPL-MCNC: 223 MG/DL (ref 65–100)
GLUCOSE SERPL-MCNC: 531 MG/DL (ref 65–100)
GLUCOSE UR STRIP.AUTO-MCNC: >1000 MG/DL
HCO3 BLDA-SCNC: 6 MMOL/L
HCT VFR BLD AUTO: 34 % (ref 36.6–50.3)
HGB BLD-MCNC: 11.5 G/DL (ref 12.1–17)
HGB UR QL STRIP: ABNORMAL
IMM GRANULOCYTES # BLD AUTO: 0.5 K/UL (ref 0–0.04)
IMM GRANULOCYTES NFR BLD AUTO: 2 % (ref 0–0.5)
KETONES UR QL STRIP.AUTO: 80 MG/DL
LACTATE SERPL-SCNC: 1.6 MMOL/L (ref 0.4–2)
LEUKOCYTE ESTERASE UR QL STRIP.AUTO: ABNORMAL
LYMPHOCYTES # BLD: 0.5 K/UL (ref 0.8–3.5)
LYMPHOCYTES NFR BLD: 2 % (ref 12–49)
Lab: NORMAL
MAGNESIUM SERPL-MCNC: 1.6 MG/DL (ref 1.6–2.4)
MAGNESIUM SERPL-MCNC: 2 MG/DL (ref 1.6–2.4)
MCH RBC QN AUTO: 31.5 PG (ref 26–34)
MCHC RBC AUTO-ENTMCNC: 33.8 G/DL (ref 30–36.5)
MCV RBC AUTO: 93.2 FL (ref 80–99)
METHADONE UR QL: NEGATIVE
MONOCYTES # BLD: 1.3 K/UL (ref 0–1)
MONOCYTES NFR BLD: 5 % (ref 5–13)
MYELOCYTES NFR BLD MANUAL: 1 %
NEUTS BAND NFR BLD MANUAL: 10 %
NEUTS SEG # BLD: 23 K/UL (ref 1.8–8)
NEUTS SEG NFR BLD: 80 % (ref 32–75)
NITRITE UR QL STRIP.AUTO: NEGATIVE
NRBC # BLD: 0 K/UL (ref 0–0.01)
NRBC BLD-RTO: 0 PER 100 WBC
OPIATES UR QL: NEGATIVE
PCO2 BLDV: 15.1 MMHG (ref 41–51)
PCP UR QL: NEGATIVE
PH BLDV: 7.22 (ref 7.32–7.42)
PH UR STRIP: 5.5 (ref 5–8)
PHOSPHATE SERPL-MCNC: 1.6 MG/DL (ref 2.6–4.7)
PLATELET # BLD AUTO: 246 K/UL (ref 150–400)
PMV BLD AUTO: 11.5 FL (ref 8.9–12.9)
PO2 BLDV: 37 MMHG (ref 25–40)
POTASSIUM BLD-SCNC: 5.3 MMOL/L (ref 3.5–5.5)
POTASSIUM SERPL-SCNC: 3.6 MMOL/L (ref 3.5–5.1)
POTASSIUM SERPL-SCNC: 4.8 MMOL/L (ref 3.5–5.1)
PROT SERPL-MCNC: 8 G/DL (ref 6.4–8.2)
PROT UR STRIP-MCNC: 30 MG/DL
RBC # BLD AUTO: 3.65 M/UL (ref 4.1–5.7)
RBC #/AREA URNS HPF: ABNORMAL /HPF (ref 0–5)
RBC MORPH BLD: ABNORMAL
SAO2 % BLD: 61 %
SERVICE CMNT-IMP: ABNORMAL
SODIUM BLD-SCNC: 114 MMOL/L (ref 136–145)
SODIUM SERPL-SCNC: 122 MMOL/L (ref 136–145)
SODIUM SERPL-SCNC: 123 MMOL/L (ref 136–145)
SODIUM SERPL-SCNC: 133 MMOL/L (ref 136–145)
SODIUM UR-SCNC: 28 MMOL/L
SP GR UR REFRACTOMETRY: 1.02 (ref 1–1.03)
SPECIMEN SITE: ABNORMAL
URINE CULTURE IF INDICATED: ABNORMAL
UROBILINOGEN UR QL STRIP.AUTO: 0.2 EU/DL (ref 0.2–1)
WBC # BLD AUTO: 25.5 K/UL (ref 4.1–11.1)
WBC MORPH BLD: ABNORMAL
WBC URNS QL MICRO: >100 /HPF (ref 0–4)

## 2024-07-11 PROCEDURE — 6360000002 HC RX W HCPCS: Performed by: INTERNAL MEDICINE

## 2024-07-11 PROCEDURE — 83735 ASSAY OF MAGNESIUM: CPT

## 2024-07-11 PROCEDURE — 87186 SC STD MICRODIL/AGAR DIL: CPT

## 2024-07-11 PROCEDURE — 82962 GLUCOSE BLOOD TEST: CPT

## 2024-07-11 PROCEDURE — 2580000003 HC RX 258: Performed by: INTERNAL MEDICINE

## 2024-07-11 PROCEDURE — 96376 TX/PRO/DX INJ SAME DRUG ADON: CPT

## 2024-07-11 PROCEDURE — 84295 ASSAY OF SERUM SODIUM: CPT

## 2024-07-11 PROCEDURE — 6370000000 HC RX 637 (ALT 250 FOR IP): Performed by: STUDENT IN AN ORGANIZED HEALTH CARE EDUCATION/TRAINING PROGRAM

## 2024-07-11 PROCEDURE — 87154 CUL TYP ID BLD PTHGN 6+ TRGT: CPT

## 2024-07-11 PROCEDURE — 80053 COMPREHEN METABOLIC PANEL: CPT

## 2024-07-11 PROCEDURE — 94761 N-INVAS EAR/PLS OXIMETRY MLT: CPT

## 2024-07-11 PROCEDURE — 84300 ASSAY OF URINE SODIUM: CPT

## 2024-07-11 PROCEDURE — 93005 ELECTROCARDIOGRAM TRACING: CPT | Performed by: STUDENT IN AN ORGANIZED HEALTH CARE EDUCATION/TRAINING PROGRAM

## 2024-07-11 PROCEDURE — 87077 CULTURE AEROBIC IDENTIFY: CPT

## 2024-07-11 PROCEDURE — 6360000002 HC RX W HCPCS: Performed by: STUDENT IN AN ORGANIZED HEALTH CARE EDUCATION/TRAINING PROGRAM

## 2024-07-11 PROCEDURE — 84132 ASSAY OF SERUM POTASSIUM: CPT

## 2024-07-11 PROCEDURE — 96374 THER/PROPH/DIAG INJ IV PUSH: CPT

## 2024-07-11 PROCEDURE — 99285 EMERGENCY DEPT VISIT HI MDM: CPT

## 2024-07-11 PROCEDURE — 71045 X-RAY EXAM CHEST 1 VIEW: CPT

## 2024-07-11 PROCEDURE — 83935 ASSAY OF URINE OSMOLALITY: CPT

## 2024-07-11 PROCEDURE — 83605 ASSAY OF LACTIC ACID: CPT

## 2024-07-11 PROCEDURE — 74176 CT ABD & PELVIS W/O CONTRAST: CPT

## 2024-07-11 PROCEDURE — 85025 COMPLETE CBC W/AUTO DIFF WBC: CPT

## 2024-07-11 PROCEDURE — 51702 INSERT TEMP BLADDER CATH: CPT

## 2024-07-11 PROCEDURE — 84100 ASSAY OF PHOSPHORUS: CPT

## 2024-07-11 PROCEDURE — 2500000003 HC RX 250 WO HCPCS: Performed by: INTERNAL MEDICINE

## 2024-07-11 PROCEDURE — 81001 URINALYSIS AUTO W/SCOPE: CPT

## 2024-07-11 PROCEDURE — 80307 DRUG TEST PRSMV CHEM ANLYZR: CPT

## 2024-07-11 PROCEDURE — 82550 ASSAY OF CK (CPK): CPT

## 2024-07-11 PROCEDURE — 2580000003 HC RX 258: Performed by: EMERGENCY MEDICINE

## 2024-07-11 PROCEDURE — 82947 ASSAY GLUCOSE BLOOD QUANT: CPT

## 2024-07-11 PROCEDURE — 36415 COLL VENOUS BLD VENIPUNCTURE: CPT

## 2024-07-11 PROCEDURE — 6370000000 HC RX 637 (ALT 250 FOR IP): Performed by: INTERNAL MEDICINE

## 2024-07-11 PROCEDURE — 2580000003 HC RX 258: Performed by: STUDENT IN AN ORGANIZED HEALTH CARE EDUCATION/TRAINING PROGRAM

## 2024-07-11 PROCEDURE — 2000000000 HC ICU R&B

## 2024-07-11 PROCEDURE — 82803 BLOOD GASES ANY COMBINATION: CPT

## 2024-07-11 PROCEDURE — 82330 ASSAY OF CALCIUM: CPT

## 2024-07-11 PROCEDURE — 83036 HEMOGLOBIN GLYCOSYLATED A1C: CPT

## 2024-07-11 PROCEDURE — 87088 URINE BACTERIA CULTURE: CPT

## 2024-07-11 PROCEDURE — 83930 ASSAY OF BLOOD OSMOLALITY: CPT

## 2024-07-11 PROCEDURE — 87040 BLOOD CULTURE FOR BACTERIA: CPT

## 2024-07-11 PROCEDURE — 87086 URINE CULTURE/COLONY COUNT: CPT

## 2024-07-11 PROCEDURE — 96361 HYDRATE IV INFUSION ADD-ON: CPT

## 2024-07-11 PROCEDURE — 6370000000 HC RX 637 (ALT 250 FOR IP): Performed by: EMERGENCY MEDICINE

## 2024-07-11 RX ORDER — SODIUM CHLORIDE 9 MG/ML
INJECTION, SOLUTION INTRAVENOUS PRN
Status: DISCONTINUED | OUTPATIENT
Start: 2024-07-11 | End: 2024-07-12

## 2024-07-11 RX ORDER — DEXTROSE MONOHYDRATE AND SODIUM CHLORIDE 5; .45 G/100ML; G/100ML
INJECTION, SOLUTION INTRAVENOUS CONTINUOUS PRN
Status: DISCONTINUED | OUTPATIENT
Start: 2024-07-11 | End: 2024-07-11

## 2024-07-11 RX ORDER — SODIUM CHLORIDE 0.9 % (FLUSH) 0.9 %
5-40 SYRINGE (ML) INJECTION PRN
Status: DISCONTINUED | OUTPATIENT
Start: 2024-07-11 | End: 2024-07-20 | Stop reason: HOSPADM

## 2024-07-11 RX ORDER — SODIUM CHLORIDE 9 MG/ML
INJECTION, SOLUTION INTRAVENOUS CONTINUOUS
Status: DISCONTINUED | OUTPATIENT
Start: 2024-07-11 | End: 2024-07-11

## 2024-07-11 RX ORDER — ACETAMINOPHEN 325 MG/1
650 TABLET ORAL EVERY 6 HOURS PRN
Status: DISCONTINUED | OUTPATIENT
Start: 2024-07-11 | End: 2024-07-20 | Stop reason: HOSPADM

## 2024-07-11 RX ORDER — HYDROCODONE BITARTRATE AND ACETAMINOPHEN 5; 325 MG/1; MG/1
1 TABLET ORAL EVERY 6 HOURS PRN
Status: DISCONTINUED | OUTPATIENT
Start: 2024-07-11 | End: 2024-07-20 | Stop reason: HOSPADM

## 2024-07-11 RX ORDER — ALPRAZOLAM 0.25 MG/1
0.25 TABLET ORAL 4 TIMES DAILY PRN
Status: DISCONTINUED | OUTPATIENT
Start: 2024-07-11 | End: 2024-07-20 | Stop reason: HOSPADM

## 2024-07-11 RX ORDER — SODIUM CHLORIDE 0.9 % (FLUSH) 0.9 %
5-40 SYRINGE (ML) INJECTION EVERY 12 HOURS SCHEDULED
Status: DISCONTINUED | OUTPATIENT
Start: 2024-07-11 | End: 2024-07-20 | Stop reason: HOSPADM

## 2024-07-11 RX ORDER — DEXTROSE MONOHYDRATE AND SODIUM CHLORIDE 5; .45 G/100ML; G/100ML
INJECTION, SOLUTION INTRAVENOUS CONTINUOUS PRN
Status: DISCONTINUED | OUTPATIENT
Start: 2024-07-11 | End: 2024-07-12

## 2024-07-11 RX ORDER — HEPARIN SODIUM 5000 [USP'U]/ML
5000 INJECTION, SOLUTION INTRAVENOUS; SUBCUTANEOUS 2 TIMES DAILY
Status: DISCONTINUED | OUTPATIENT
Start: 2024-07-11 | End: 2024-07-20 | Stop reason: HOSPADM

## 2024-07-11 RX ORDER — ACETAMINOPHEN 650 MG/1
650 SUPPOSITORY RECTAL EVERY 6 HOURS PRN
Status: DISCONTINUED | OUTPATIENT
Start: 2024-07-11 | End: 2024-07-20 | Stop reason: HOSPADM

## 2024-07-11 RX ORDER — 0.9 % SODIUM CHLORIDE 0.9 %
500 INTRAVENOUS SOLUTION INTRAVENOUS ONCE
Status: DISCONTINUED | OUTPATIENT
Start: 2024-07-11 | End: 2024-07-11

## 2024-07-11 RX ORDER — POLYETHYLENE GLYCOL 3350 17 G/17G
17 POWDER, FOR SOLUTION ORAL DAILY PRN
Status: DISCONTINUED | OUTPATIENT
Start: 2024-07-11 | End: 2024-07-12

## 2024-07-11 RX ORDER — ONDANSETRON 2 MG/ML
4 INJECTION INTRAMUSCULAR; INTRAVENOUS EVERY 6 HOURS PRN
Status: DISCONTINUED | OUTPATIENT
Start: 2024-07-11 | End: 2024-07-20 | Stop reason: HOSPADM

## 2024-07-11 RX ORDER — DEXTROSE MONOHYDRATE 100 MG/ML
INJECTION, SOLUTION INTRAVENOUS CONTINUOUS PRN
Status: DISCONTINUED | OUTPATIENT
Start: 2024-07-11 | End: 2024-07-12

## 2024-07-11 RX ORDER — 0.9 % SODIUM CHLORIDE 0.9 %
1000 INTRAVENOUS SOLUTION INTRAVENOUS ONCE
Status: COMPLETED | OUTPATIENT
Start: 2024-07-11 | End: 2024-07-11

## 2024-07-11 RX ORDER — ONDANSETRON 4 MG/1
4 TABLET, ORALLY DISINTEGRATING ORAL EVERY 8 HOURS PRN
Status: DISCONTINUED | OUTPATIENT
Start: 2024-07-11 | End: 2024-07-20 | Stop reason: HOSPADM

## 2024-07-11 RX ADMIN — SODIUM BICARBONATE: 84 INJECTION, SOLUTION INTRAVENOUS at 17:41

## 2024-07-11 RX ADMIN — SODIUM BICARBONATE 50 MEQ: 84 INJECTION INTRAVENOUS at 17:42

## 2024-07-11 RX ADMIN — CEFEPIME 2000 MG: 2 INJECTION, POWDER, FOR SOLUTION INTRAVENOUS at 16:20

## 2024-07-11 RX ADMIN — SODIUM CHLORIDE 3.4 UNITS/HR: 9 INJECTION, SOLUTION INTRAVENOUS at 16:55

## 2024-07-11 RX ADMIN — SODIUM CHLORIDE 1000 ML: 9 INJECTION, SOLUTION INTRAVENOUS at 14:28

## 2024-07-11 RX ADMIN — ALPRAZOLAM 0.25 MG: 0.25 TABLET ORAL at 18:23

## 2024-07-11 RX ADMIN — HEPARIN SODIUM 5000 UNITS: 5000 INJECTION INTRAVENOUS; SUBCUTANEOUS at 20:45

## 2024-07-11 RX ADMIN — ACETAMINOPHEN 650 MG: 325 TABLET ORAL at 20:41

## 2024-07-11 RX ADMIN — ONDANSETRON 4 MG: 2 INJECTION INTRAMUSCULAR; INTRAVENOUS at 20:31

## 2024-07-11 RX ADMIN — SODIUM CHLORIDE 10.1 UNITS/HR: 9 INJECTION, SOLUTION INTRAVENOUS at 15:42

## 2024-07-11 RX ADMIN — SODIUM CHLORIDE: 9 INJECTION, SOLUTION INTRAVENOUS at 23:55

## 2024-07-11 RX ADMIN — SODIUM BICARBONATE: 84 INJECTION, SOLUTION INTRAVENOUS at 19:20

## 2024-07-11 RX ADMIN — SODIUM CHLORIDE 1000 ML: 9 INJECTION, SOLUTION INTRAVENOUS at 16:03

## 2024-07-11 ASSESSMENT — PAIN - FUNCTIONAL ASSESSMENT: PAIN_FUNCTIONAL_ASSESSMENT: NONE - DENIES PAIN

## 2024-07-11 NOTE — ED TRIAGE NOTES
Pt arrives to ED via POV in wheelchair c/o loss of appetite x 4 days. Hx late onset type 1 DM and failure to thrive. Pt reports incontinence of bowel and urine x 1 year. Provider in triage.

## 2024-07-11 NOTE — PROGRESS NOTES
Pharmacist renally adjust cefepime to 1000 mg IV every 12 hours EI per P and T approved protocol for crcl 29 ml/min.    Thank you,      Jhoana Green, PharmD, BCPS

## 2024-07-11 NOTE — H&P
[facility]  [address] [place]  [address] [phone]    Admission History and Physical      NAME:  Steve Martinez   :   1970   MRN:  [id number]     PCP:  Rima Giron MD     Date/Time:  2024         Subjective:     CHIEF COMPLAINT: \"I just was really weak\"     HISTORY OF PRESENT ILLNESS:     Mr. Martinez is a 53 y.o.   male with T1DM, neuropathy admitted for DKA, AMOS and severe sepsis. Over the last few days has had very little appetite and also was weak so was having a hard time getting up to get himself food/drinks etc. Denies ab pain. No N/V. Was constipated but this has resolved. No numbness in his groin or legs. No back pain.     Past Medical History:   Diagnosis Date    History of diabetes insipidus     History of weight loss     Neuropathy     Uncontrolled type 2 diabetes mellitus with hyperglycemia (HCC) 3/3/2021        No past surgical history on file.    Social History     Tobacco Use    Smoking status: Never    Smokeless tobacco: Never   Substance Use Topics    Alcohol use: Not Currently      FH   HTN     No Known Allergies     Prior to Admission medications    Medication Sig Start Date End Date Taking? Authorizing Provider   tamsulosin (FLOMAX) 0.4 MG capsule Take 1 capsule by mouth daily 3/19/24   Ali, Mohsin, MD   insulin glargine (LANTUS SOLOSTAR) 100 UNIT/ML injection pen Inject 10 Units into the skin daily 3/19/24   Ali, Mohsin, MD   insulin aspart (NOVOLOG FLEXPEN) 100 UNIT/ML injection pen 4 Units in the morning and at bedtime Takes before lunch and dinner 3/19/24   Ali, Mohsin, MD   Lancets MISC Use to check sugar 4 times per day 19   Automatic Reconciliation, Ar         Review of Systems:  (bold if positive, if negative)    Gen:  Eyes:  ENT:  CVS:  Pulm:  GI:  :  MS:  Skin:  Psych:  Endo:  Hem:  Renal:  Neuro:     Weakness, poor PO intake        Objective:      VITALS:    Vital signs reviewed; most recent

## 2024-07-11 NOTE — ED PROVIDER NOTES
ED SIGN OUT NOTE  Care assumed at [facility] 4:59 PM EDT    Patient was signed out to me by Dr. Lowery.     Patient is awaiting imaging and final disposition.    /72   Pulse 76   Temp 98.4 °F (36.9 °C) (Oral)   Resp 14   Ht 1.575 m (5' 2\")   Wt 40.8 kg (90 lb)   SpO2 98%   BMI 16.46 kg/m²     Labs Reviewed   CULTURE, BLOOD 1 - Abnormal; Notable for the following components:       Result Value    Culture   (*)     Value: PROBABLE Staphylococcus aureus GROWING IN 1 OF 2 BOTTLES DRAWN SITE R AC    Culture   (*)     Value: Gram positive cocci IN CLUSTERS GROWING IN BOTH BOTTLES DRAWN SITE = R AC    All other components within normal limits   CULTURE, BLOOD 2 - Abnormal; Notable for the following components:    Culture   (*)     Value: Gram positive cocci IN CLUSTERS GROWING IN BOTH BOTTLES DRAWN SITE= L AC    All other components within normal limits   CULTURE, URINE - Abnormal; Notable for the following components:    Culture PROBABLE Staphylococcus aureus (*)     All other components within normal limits   CULTURE, BLOOD, PCR ID PANEL RESULTS REPORT - Abnormal; Notable for the following components:    STAPHYLOCOCCUS Detected (*)     Staphylococcus Aureus Detected (*)     All other components within normal limits   CBC WITH AUTO DIFFERENTIAL - Abnormal; Notable for the following components:    WBC 25.5 (*)     RBC 3.65 (*)     Hemoglobin 11.5 (*)     Hematocrit 34.0 (*)     Neutrophils % 80 (*)     Lymphocytes % 2 (*)     Immature Granulocytes % 2 (*)     Neutrophils Absolute 23.0 (*)     Lymphocytes Absolute 0.5 (*)     Monocytes Absolute 1.3 (*)     Immature Granulocytes Absolute 0.5 (*)     All other components within normal limits   COMPREHENSIVE METABOLIC PANEL - Abnormal; Notable for the following components:    Sodium 122 (*)     Chloride 86 (*)     CO2 6 (*)     Anion Gap 30 (*)     Glucose 531 (*)     BUN 39 (*)     Creatinine 2.34 (*)     Est, Glom Filt Rate 32 (*)     Alk        Disposition:   Admitted 07/11/2024 05:16:16 PM    Plan:   Patient appears to be in DKA with associated acute kidney injury, hyponatremia, metabolic acidosis, and septicemia.  Plan discussed with the patient and family at bedside.  This time I did recommend admission and they were agreeable with this plan.  Patient started on the insulin drip.  Plan to admit patient to ICU.    Patient care was discussed with Dr. Frias for admission via perfect serve.    Perfect Serve Consult for Admission  2:59 AM    ED Room Number: B326/01  Patient Name and age:  Steve Martinez 53 y.o.  male  Working Diagnosis:   1. Severe sepsis (HCC)    2. AMOS (acute kidney injury) (HCC)    3. Diabetic ketoacidosis without coma associated with type 1 diabetes mellitus (HCC)    4. Hyponatremia    5. Metabolic acidosis    6. Urinary retention    7. Failure to thrive in adult    8. Septicemia (HCC)        COVID-19 Suspicion: No  Sepsis present:  No  Reassessment needed: No  Code Status:  Full Code  Readmission: No  Isolation Requirements: no  Recommended Level of Care: ICU  Department: [place] [facility] - [phone]  Consulting Provider:     Other: Patient found to have leukocytosis of 25,000, lactic acid normal, CT shows questionable left-sided pyelonephritis with hydroureteronephrosis.  It does appear that patient has an acute kidney injury with metabolic acidosis likely due to DKA.  He is on insulin drip.  He was given empiric antibiotics    Total critical care time spent exclusive of procedures:  45 minutes.     DO Wenceslao Schuler Junaid, DO  07/13/24 0259

## 2024-07-11 NOTE — ED NOTES
Bedside and Verbal shift change report given to Elfego (oncoming nurse) by Kinsey (offgoing nurse). Report included the following information Nurse Handoff Report, Index, ED Encounter Summary, ED SBAR, Adult Overview, Surgery Report, Intake/Output, MAR, Recent Results, Cardiac Rhythm sinus tachy, Quality Measures, Neuro Assessment, and Event Log.

## 2024-07-11 NOTE — ED PROVIDER NOTES
[facility] EMERGENCY DEPT  EMERGENCY DEPARTMENT ENCOUNTER      Pt Name: Steve Martinez  MRN: [id number]  Birthdate 1970  Date of evaluation: 7/11/2024  Provider: Brittany Lowery MD    CHIEF COMPLAINT       Chief Complaint   Patient presents with    Incontinence    Anorexia     HISTORY OF PRESENT ILLNESS   (Location/Symptom, Timing/Onset, Context/Setting, Quality, Duration, Modifying Factors, Severity)  Note limiting factors.   HPI  53-year-old male with past medical history of type 1 diabetes, diabetes insipidus, neuropathy, presents to the ER with family for evaluation of failure to thrive, lack of appetite with absence of p.o. for the past 4 days, inability to care for self.  Family reports patient lives by himself, and he is unable to care for himself safely, and they are concerned for his safety.  States he was unwilling to come to the hospital, however, they are concerned something is truly wrong as he has been completely unable to PO for the past 4 days. Patient also reports urinary and bowel incontinence for the past year, which he is unable to identify cause of, and states his other doctors have also been unable to explain it. He denies any abdominal pain, nausea, vomiting, fevers or chills. Denies any recent illness.     Review of External Medical Records:     Nursing Notes were reviewed.    REVIEW OF SYSTEMS    (2-9 systems for level 4, 10 or more for level 5)     Review of Systems   All other systems reviewed and are negative.      Except as noted above the remainder of the review of systems was reviewed and negative.       PAST MEDICAL HISTORY     Past Medical History:   Diagnosis Date    History of diabetes insipidus     History of weight loss     Neuropathy     Uncontrolled type 2 diabetes mellitus with hyperglycemia (HCC) 3/3/2021         SURGICAL HISTORY     No past surgical history on file.      CURRENT MEDICATIONS       Previous Medications    INSULIN ASPART (NOVOLOG FLEXPEN) 100 UNIT/ML  INJECTION PEN    4 Units in the morning and at bedtime Takes before lunch and dinner    INSULIN GLARGINE (LANTUS SOLOSTAR) 100 UNIT/ML INJECTION PEN    Inject 10 Units into the skin daily    LANCETS MISC    Use to check sugar 4 times per day    TAMSULOSIN (FLOMAX) 0.4 MG CAPSULE    Take 1 capsule by mouth daily       ALLERGIES     Patient has no known allergies.    FAMILY HISTORY     No family history on file.       SOCIAL HISTORY       Social History     Socioeconomic History    Marital status:    Tobacco Use    Smoking status: Never    Smokeless tobacco: Never   Substance and Sexual Activity    Alcohol use: Not Currently    Drug use: Never     Social Determinants of Health     Financial Resource Strain: Patient Declined (6/15/2023)    Overall Financial Resource Strain (CARDIA)     Difficulty of Paying Living Expenses: Patient declined   Transportation Needs: Unknown (6/15/2023)    PRAPARE - Transportation     Lack of Transportation (Non-Medical): No   Housing Stability: Unknown (6/15/2023)    Housing Stability Vital Sign     Unstable Housing in the Last Year: No           PHYSICAL EXAM    (up to 7 for level 4, 8 or more for level 5)     ED Triage Vitals   BP Temp Temp src Pulse Resp SpO2 Height Weight   -- -- -- -- -- -- -- --       Body mass index is 16.61 kg/m².    Physical Exam  Vitals and nursing note reviewed.   Constitutional:       General: He is not in acute distress.     Appearance: Normal appearance. He is cachectic. He is ill-appearing.   HENT:      Head: Normocephalic and atraumatic.      Nose: Nose normal.      Mouth/Throat:      Mouth: Mucous membranes are dry.   Eyes:      Extraocular Movements: Extraocular movements intact.      Pupils: Pupils are equal, round, and reactive to light.   Cardiovascular:      Rate and Rhythm: Regular rhythm. Tachycardia present.      Pulses: Normal pulses.      Heart sounds: Normal heart sounds.   Pulmonary:      Effort: Pulmonary effort is normal.      Breath

## 2024-07-12 ENCOUNTER — APPOINTMENT (OUTPATIENT)
Facility: HOSPITAL | Age: 54
DRG: 871 | End: 2024-07-12
Attending: INTERNAL MEDICINE

## 2024-07-12 PROBLEM — A41.9 SEPTICEMIA (HCC): Status: ACTIVE | Noted: 2024-07-12

## 2024-07-12 PROBLEM — B95.61 STAPHYLOCOCCUS AUREUS BACTEREMIA: Status: ACTIVE | Noted: 2024-07-12

## 2024-07-12 PROBLEM — R78.81 STAPHYLOCOCCUS AUREUS BACTEREMIA: Status: ACTIVE | Noted: 2024-07-12

## 2024-07-12 PROBLEM — N12 PYELONEPHRITIS: Status: ACTIVE | Noted: 2024-07-12

## 2024-07-12 PROBLEM — I35.8 AORTIC VALVE ENDOCARDITIS: Status: ACTIVE | Noted: 2024-07-12

## 2024-07-12 PROBLEM — E87.20 METABOLIC ACIDOSIS: Status: ACTIVE | Noted: 2024-07-12

## 2024-07-12 PROBLEM — E43 SEVERE PROTEIN-CALORIE MALNUTRITION (HCC): Chronic | Status: ACTIVE | Noted: 2024-07-12

## 2024-07-12 PROBLEM — R62.7 FAILURE TO THRIVE IN ADULT: Status: ACTIVE | Noted: 2024-07-12

## 2024-07-12 LAB
ACCESSION NUMBER, LLC1M: ABNORMAL
ACINETOBACTER CALCOAC BAUMANNII COMPLEX BY PCR: NOT DETECTED
ALBUMIN SERPL-MCNC: 2.1 G/DL (ref 3.5–5)
ALBUMIN/GLOB SERPL: 0.7 (ref 1.1–2.2)
ALP SERPL-CCNC: 95 U/L (ref 45–117)
ALT SERPL-CCNC: 15 U/L (ref 12–78)
ANION GAP SERPL CALC-SCNC: 10 MMOL/L (ref 5–15)
ANION GAP SERPL CALC-SCNC: 12 MMOL/L (ref 5–15)
ANION GAP SERPL CALC-SCNC: 13 MMOL/L (ref 5–15)
ANION GAP SERPL CALC-SCNC: 7 MMOL/L (ref 5–15)
AST SERPL-CCNC: 15 U/L (ref 15–37)
B FRAGILIS DNA BLD POS QL NAA+NON-PROBE: NOT DETECTED
BASOPHILS # BLD: 0 K/UL (ref 0–0.1)
BASOPHILS # BLD: 0 K/UL (ref 0–0.1)
BASOPHILS NFR BLD: 0 % (ref 0–1)
BASOPHILS NFR BLD: 0 % (ref 0–1)
BILIRUB DIRECT SERPL-MCNC: 0.3 MG/DL (ref 0–0.2)
BILIRUB SERPL-MCNC: 0.5 MG/DL (ref 0.2–1)
BIOFIRE TEST COMMENT: ABNORMAL
BUN SERPL-MCNC: 27 MG/DL (ref 6–20)
BUN SERPL-MCNC: 30 MG/DL (ref 6–20)
BUN SERPL-MCNC: 32 MG/DL (ref 6–20)
BUN SERPL-MCNC: 34 MG/DL (ref 6–20)
BUN/CREAT SERPL: 18 (ref 12–20)
BUN/CREAT SERPL: 18 (ref 12–20)
BUN/CREAT SERPL: 19 (ref 12–20)
BUN/CREAT SERPL: 19 (ref 12–20)
C ALBICANS DNA BLD POS QL NAA+NON-PROBE: NOT DETECTED
C AURIS DNA BLD POS QL NAA+NON-PROBE: NOT DETECTED
C GATTII+NEOFOR DNA BLD POS QL NAA+N-PRB: NOT DETECTED
C GLABRATA DNA BLD POS QL NAA+NON-PROBE: NOT DETECTED
C KRUSEI DNA BLD POS QL NAA+NON-PROBE: NOT DETECTED
C PARAP DNA BLD POS QL NAA+NON-PROBE: NOT DETECTED
C TROPICLS DNA BLD POS QL NAA+NON-PROBE: NOT DETECTED
CALCIUM SERPL-MCNC: 7.9 MG/DL (ref 8.5–10.1)
CALCIUM SERPL-MCNC: 7.9 MG/DL (ref 8.5–10.1)
CALCIUM SERPL-MCNC: 8 MG/DL (ref 8.5–10.1)
CALCIUM SERPL-MCNC: 9 MG/DL (ref 8.5–10.1)
CHLORIDE SERPL-SCNC: 100 MMOL/L (ref 97–108)
CHLORIDE SERPL-SCNC: 101 MMOL/L (ref 97–108)
CHLORIDE SERPL-SCNC: 101 MMOL/L (ref 97–108)
CHLORIDE SERPL-SCNC: 104 MMOL/L (ref 97–108)
CO2 SERPL-SCNC: 23 MMOL/L (ref 21–32)
CO2 SERPL-SCNC: 25 MMOL/L (ref 21–32)
CO2 SERPL-SCNC: 25 MMOL/L (ref 21–32)
CO2 SERPL-SCNC: 27 MMOL/L (ref 21–32)
COMMENT:: NORMAL
CREAT SERPL-MCNC: 1.53 MG/DL (ref 0.7–1.3)
CREAT SERPL-MCNC: 1.62 MG/DL (ref 0.7–1.3)
CREAT SERPL-MCNC: 1.65 MG/DL (ref 0.7–1.3)
CREAT SERPL-MCNC: 1.93 MG/DL (ref 0.7–1.3)
DIFFERENTIAL METHOD BLD: ABNORMAL
DIFFERENTIAL METHOD BLD: ABNORMAL
E CLOAC COMP DNA BLD POS NAA+NON-PROBE: NOT DETECTED
E COLI DNA BLD POS QL NAA+NON-PROBE: NOT DETECTED
E FAECALIS DNA BLD POS QL NAA+NON-PROBE: NOT DETECTED
E FAECIUM DNA BLD POS QL NAA+NON-PROBE: NOT DETECTED
ECHO AO ARCH DIAM: 1.8 CM
ECHO AO ASC DIAM: 3.1 CM
ECHO AO ASCENDING AORTA INDEX: 2.28 CM/M2
ECHO AO ROOT DIAM: 2.5 CM
ECHO AO ROOT INDEX: 1.84 CM/M2
ECHO AV AREA PEAK VELOCITY: 2.2 CM2
ECHO AV AREA VTI: 2.4 CM2
ECHO AV AREA/BSA PEAK VELOCITY: 1.6 CM2/M2
ECHO AV AREA/BSA VTI: 1.8 CM2/M2
ECHO AV MEAN GRADIENT: 2 MMHG
ECHO AV MEAN VELOCITY: 0.7 M/S
ECHO AV PEAK GRADIENT: 4 MMHG
ECHO AV PEAK VELOCITY: 1 M/S
ECHO AV VELOCITY RATIO: 0.7
ECHO AV VTI: 16.1 CM
ECHO BSA: 1.34 M2
ECHO LA DIAMETER INDEX: 1.99 CM/M2
ECHO LA DIAMETER: 2.7 CM
ECHO LA TO AORTIC ROOT RATIO: 1.08
ECHO LA VOL A-L A2C: 17 ML (ref 18–58)
ECHO LA VOL A-L A4C: 25 ML (ref 18–58)
ECHO LA VOL BP: 20 ML (ref 18–58)
ECHO LA VOL MOD A2C: 16 ML (ref 18–58)
ECHO LA VOL MOD A4C: 20 ML (ref 18–58)
ECHO LA VOL/BSA BIPLANE: 15 ML/M2 (ref 16–34)
ECHO LA VOLUME AREA LENGTH: 24 ML
ECHO LA VOLUME INDEX A-L A2C: 13 ML/M2 (ref 16–34)
ECHO LA VOLUME INDEX A-L A4C: 18 ML/M2 (ref 16–34)
ECHO LA VOLUME INDEX AREA LENGTH: 18 ML/M2 (ref 16–34)
ECHO LA VOLUME INDEX MOD A2C: 12 ML/M2 (ref 16–34)
ECHO LA VOLUME INDEX MOD A4C: 15 ML/M2 (ref 16–34)
ECHO LV E' LATERAL VELOCITY: 9 CM/S
ECHO LV E' SEPTAL VELOCITY: 6 CM/S
ECHO LV EDV A2C: 37 ML
ECHO LV EDV A4C: 54 ML
ECHO LV EDV BP: 50 ML (ref 67–155)
ECHO LV EDV INDEX A4C: 40 ML/M2
ECHO LV EDV INDEX BP: 37 ML/M2
ECHO LV EDV NDEX A2C: 27 ML/M2
ECHO LV EJECTION FRACTION A2C: 42 %
ECHO LV EJECTION FRACTION A4C: 44 %
ECHO LV ESV A2C: 21 ML
ECHO LV ESV A4C: 30 ML
ECHO LV ESV BP: 26 ML (ref 22–58)
ECHO LV ESV INDEX A2C: 15 ML/M2
ECHO LV ESV INDEX A4C: 22 ML/M2
ECHO LV ESV INDEX BP: 19 ML/M2
ECHO LV FRACTIONAL SHORTENING: 24 % (ref 28–44)
ECHO LV INTERNAL DIMENSION DIASTOLE INDEX: 2.79 CM/M2
ECHO LV INTERNAL DIMENSION DIASTOLIC: 3.8 CM (ref 4.2–5.9)
ECHO LV INTERNAL DIMENSION SYSTOLIC INDEX: 2.13 CM/M2
ECHO LV INTERNAL DIMENSION SYSTOLIC: 2.9 CM
ECHO LV IVSD: 0.8 CM (ref 0.6–1)
ECHO LV MASS 2D: 93.4 G (ref 88–224)
ECHO LV MASS INDEX 2D: 68.7 G/M2 (ref 49–115)
ECHO LV POSTERIOR WALL DIASTOLIC: 0.9 CM (ref 0.6–1)
ECHO LV RELATIVE WALL THICKNESS RATIO: 0.47
ECHO LVOT AREA: 2.8 CM2
ECHO LVOT AV VTI INDEX: 0.85
ECHO LVOT DIAM: 1.9 CM
ECHO LVOT MEAN GRADIENT: 1 MMHG
ECHO LVOT PEAK GRADIENT: 2 MMHG
ECHO LVOT PEAK VELOCITY: 0.7 M/S
ECHO LVOT STROKE VOLUME INDEX: 28.5 ML/M2
ECHO LVOT SV: 38.8 ML
ECHO LVOT VTI: 13.7 CM
ECHO MV A VELOCITY: 0.63 M/S
ECHO MV AREA VTI: 3.2 CM2
ECHO MV E DECELERATION TIME (DT): 134.2 MS
ECHO MV E VELOCITY: 0.5 M/S
ECHO MV E/A RATIO: 0.79
ECHO MV E/E' LATERAL: 5.56
ECHO MV E/E' RATIO (AVERAGED): 6.94
ECHO MV E/E' SEPTAL: 8.33
ECHO MV LVOT VTI INDEX: 0.9
ECHO MV MAX VELOCITY: 0.7 M/S
ECHO MV MEAN GRADIENT: 1 MMHG
ECHO MV MEAN VELOCITY: 0.4 M/S
ECHO MV PEAK GRADIENT: 2 MMHG
ECHO MV VTI: 12.3 CM
ECHO PV MAX VELOCITY: 0.5 M/S
ECHO PV PEAK GRADIENT: 1 MMHG
ECHO RV FREE WALL PEAK S': 9 CM/S
ECHO RV INTERNAL DIMENSION: 2.6 CM
ECHO RV TAPSE: 1.5 CM (ref 1.7–?)
EKG ATRIAL RATE: 111 BPM
EKG DIAGNOSIS: NORMAL
EKG P AXIS: 76 DEGREES
EKG P-R INTERVAL: 146 MS
EKG Q-T INTERVAL: 348 MS
EKG QRS DURATION: 76 MS
EKG QTC CALCULATION (BAZETT): 473 MS
EKG R AXIS: 59 DEGREES
EKG T AXIS: 73 DEGREES
EKG VENTRICULAR RATE: 111 BPM
ENTEROBACTERALES DNA BLD POS NAA+N-PRB: NOT DETECTED
EOSINOPHIL # BLD: 0 K/UL (ref 0–0.4)
EOSINOPHIL # BLD: 0.3 K/UL (ref 0–0.4)
EOSINOPHIL NFR BLD: 0 % (ref 0–7)
EOSINOPHIL NFR BLD: 1 % (ref 0–7)
ERYTHROCYTE [DISTWIDTH] IN BLOOD BY AUTOMATED COUNT: 11.9 % (ref 11.5–14.5)
ERYTHROCYTE [DISTWIDTH] IN BLOOD BY AUTOMATED COUNT: 12.1 % (ref 11.5–14.5)
EST. AVERAGE GLUCOSE BLD GHB EST-MCNC: 352 MG/DL
EST. AVERAGE GLUCOSE BLD GHB EST-MCNC: ABNORMAL MG/DL
GLOBULIN SER CALC-MCNC: 3.1 G/DL (ref 2–4)
GLUCOSE BLD STRIP.AUTO-MCNC: 100 MG/DL (ref 65–117)
GLUCOSE BLD STRIP.AUTO-MCNC: 112 MG/DL (ref 65–117)
GLUCOSE BLD STRIP.AUTO-MCNC: 115 MG/DL (ref 65–117)
GLUCOSE BLD STRIP.AUTO-MCNC: 115 MG/DL (ref 65–117)
GLUCOSE BLD STRIP.AUTO-MCNC: 129 MG/DL (ref 65–117)
GLUCOSE BLD STRIP.AUTO-MCNC: 137 MG/DL (ref 65–117)
GLUCOSE BLD STRIP.AUTO-MCNC: 151 MG/DL (ref 65–117)
GLUCOSE BLD STRIP.AUTO-MCNC: 160 MG/DL (ref 65–117)
GLUCOSE BLD STRIP.AUTO-MCNC: 186 MG/DL (ref 65–117)
GLUCOSE BLD STRIP.AUTO-MCNC: 195 MG/DL (ref 65–117)
GLUCOSE BLD STRIP.AUTO-MCNC: 196 MG/DL (ref 65–117)
GLUCOSE BLD STRIP.AUTO-MCNC: 202 MG/DL (ref 65–117)
GLUCOSE BLD STRIP.AUTO-MCNC: 206 MG/DL (ref 65–117)
GLUCOSE BLD STRIP.AUTO-MCNC: 233 MG/DL (ref 65–117)
GLUCOSE BLD STRIP.AUTO-MCNC: 241 MG/DL (ref 65–117)
GLUCOSE SERPL-MCNC: 125 MG/DL (ref 65–100)
GLUCOSE SERPL-MCNC: 161 MG/DL (ref 65–100)
GLUCOSE SERPL-MCNC: 168 MG/DL (ref 65–100)
GLUCOSE SERPL-MCNC: 193 MG/DL (ref 65–100)
GP B STREP DNA BLD POS QL NAA+NON-PROBE: NOT DETECTED
HAEM INFLU DNA BLD POS QL NAA+NON-PROBE: NOT DETECTED
HBA1C MFR BLD: 13.9 % (ref 4–5.6)
HBA1C MFR BLD: >14 % (ref 4–5.6)
HCT VFR BLD AUTO: 28.6 % (ref 36.6–50.3)
HCT VFR BLD AUTO: 32.5 % (ref 36.6–50.3)
HGB BLD-MCNC: 11.5 G/DL (ref 12.1–17)
HGB BLD-MCNC: 9.9 G/DL (ref 12.1–17)
IMM GRANULOCYTES # BLD AUTO: 0.3 K/UL (ref 0–0.04)
IMM GRANULOCYTES # BLD AUTO: 0.3 K/UL (ref 0–0.04)
IMM GRANULOCYTES NFR BLD AUTO: 1 % (ref 0–0.5)
IMM GRANULOCYTES NFR BLD AUTO: 1 % (ref 0–0.5)
K OXYTOCA DNA BLD POS QL NAA+NON-PROBE: NOT DETECTED
KLEBSIELLA SP DNA BLD POS QL NAA+NON-PRB: NOT DETECTED
KLEBSIELLA SP DNA BLD POS QL NAA+NON-PRB: NOT DETECTED
L MONOCYTOG DNA BLD POS QL NAA+NON-PROBE: NOT DETECTED
LYMPHOCYTES # BLD: 0.5 K/UL (ref 0.8–3.5)
LYMPHOCYTES # BLD: 0.8 K/UL (ref 0.8–3.5)
LYMPHOCYTES NFR BLD: 2 % (ref 12–49)
LYMPHOCYTES NFR BLD: 3 % (ref 12–49)
MAGNESIUM SERPL-MCNC: 1.6 MG/DL (ref 1.6–2.4)
MAGNESIUM SERPL-MCNC: 1.6 MG/DL (ref 1.6–2.4)
MAGNESIUM SERPL-MCNC: 1.7 MG/DL (ref 1.6–2.4)
MAGNESIUM SERPL-MCNC: 2 MG/DL (ref 1.6–2.4)
MCH RBC QN AUTO: 30.9 PG (ref 26–34)
MCH RBC QN AUTO: 31.3 PG (ref 26–34)
MCHC RBC AUTO-ENTMCNC: 34.6 G/DL (ref 30–36.5)
MCHC RBC AUTO-ENTMCNC: 35.4 G/DL (ref 30–36.5)
MCV RBC AUTO: 88.6 FL (ref 80–99)
MCV RBC AUTO: 89.4 FL (ref 80–99)
MECA+MECC+MREJ ISLT/SPM QL: NOT DETECTED
MONOCYTES # BLD: 1.5 K/UL (ref 0–1)
MONOCYTES # BLD: 1.6 K/UL (ref 0–1)
MONOCYTES NFR BLD: 6 % (ref 5–13)
MONOCYTES NFR BLD: 6 % (ref 5–13)
N MEN DNA BLD POS QL NAA+NON-PROBE: NOT DETECTED
NEUTS SEG # BLD: 22.7 K/UL (ref 1.8–8)
NEUTS SEG # BLD: 23.5 K/UL (ref 1.8–8)
NEUTS SEG NFR BLD: 90 % (ref 32–75)
NEUTS SEG NFR BLD: 90 % (ref 32–75)
NRBC # BLD: 0 K/UL (ref 0–0.01)
NRBC # BLD: 0 K/UL (ref 0–0.01)
NRBC BLD-RTO: 0 PER 100 WBC
NRBC BLD-RTO: 0 PER 100 WBC
OSMOLALITY SERPL: 315 MOSM/KG H2O
OSMOLALITY UR: 277 MOSM/KG H2O
P AERUGINOSA DNA BLD POS NAA+NON-PROBE: NOT DETECTED
PHOSPHATE SERPL-MCNC: 2.2 MG/DL (ref 2.6–4.7)
PHOSPHATE SERPL-MCNC: 2.5 MG/DL (ref 2.6–4.7)
PHOSPHATE SERPL-MCNC: 2.6 MG/DL (ref 2.6–4.7)
PHOSPHATE SERPL-MCNC: 3.6 MG/DL (ref 2.6–4.7)
PLATELET # BLD AUTO: 203 K/UL (ref 150–400)
PLATELET # BLD AUTO: 205 K/UL (ref 150–400)
PMV BLD AUTO: 10.1 FL (ref 8.9–12.9)
PMV BLD AUTO: 10.1 FL (ref 8.9–12.9)
POTASSIUM SERPL-SCNC: 3 MMOL/L (ref 3.5–5.1)
POTASSIUM SERPL-SCNC: 3.1 MMOL/L (ref 3.5–5.1)
POTASSIUM SERPL-SCNC: 3.5 MMOL/L (ref 3.5–5.1)
POTASSIUM SERPL-SCNC: 3.6 MMOL/L (ref 3.5–5.1)
PROT SERPL-MCNC: 5.2 G/DL (ref 6.4–8.2)
PROTEUS SP DNA BLD POS QL NAA+NON-PROBE: NOT DETECTED
PSA SERPL-MCNC: 1 NG/ML (ref 0.01–4)
RBC # BLD AUTO: 3.2 M/UL (ref 4.1–5.7)
RBC # BLD AUTO: 3.67 M/UL (ref 4.1–5.7)
RBC MORPH BLD: ABNORMAL
RBC MORPH BLD: ABNORMAL
RESISTANT GENE TARGETS: ABNORMAL
S AUREUS DNA BLD POS QL NAA+NON-PROBE: DETECTED
S AUREUS+CONS DNA BLD POS NAA+NON-PROBE: DETECTED
S EPIDERMIDIS DNA BLD POS QL NAA+NON-PRB: NOT DETECTED
S LUGDUNENSIS DNA BLD POS QL NAA+NON-PRB: NOT DETECTED
S MALTOPHILIA DNA BLD POS QL NAA+NON-PRB: NOT DETECTED
S MARCESCENS DNA BLD POS NAA+NON-PROBE: NOT DETECTED
S PNEUM DNA BLD POS QL NAA+NON-PROBE: NOT DETECTED
S PYO DNA BLD POS QL NAA+NON-PROBE: NOT DETECTED
SALMONELLA DNA BLD POS QL NAA+NON-PROBE: NOT DETECTED
SERVICE CMNT-IMP: ABNORMAL
SERVICE CMNT-IMP: NORMAL
SODIUM SERPL-SCNC: 135 MMOL/L (ref 136–145)
SODIUM SERPL-SCNC: 137 MMOL/L (ref 136–145)
SODIUM SERPL-SCNC: 138 MMOL/L (ref 136–145)
SODIUM SERPL-SCNC: 138 MMOL/L (ref 136–145)
SPECIMEN HOLD: NORMAL
STREPTOCOCCUS DNA BLD POS NAA+NON-PROBE: NOT DETECTED
TSH SERPL DL<=0.05 MIU/L-ACNC: 0.74 UIU/ML (ref 0.36–3.74)
WBC # BLD AUTO: 25.3 K/UL (ref 4.1–11.1)
WBC # BLD AUTO: 26.2 K/UL (ref 4.1–11.1)
WBC MORPH BLD: ABNORMAL

## 2024-07-12 PROCEDURE — 99222 1ST HOSP IP/OBS MODERATE 55: CPT

## 2024-07-12 PROCEDURE — 97530 THERAPEUTIC ACTIVITIES: CPT

## 2024-07-12 PROCEDURE — 80076 HEPATIC FUNCTION PANEL: CPT

## 2024-07-12 PROCEDURE — 2000000000 HC ICU R&B

## 2024-07-12 PROCEDURE — 93010 ELECTROCARDIOGRAM REPORT: CPT | Performed by: SPECIALIST

## 2024-07-12 PROCEDURE — 6360000002 HC RX W HCPCS: Performed by: INTERNAL MEDICINE

## 2024-07-12 PROCEDURE — 2500000003 HC RX 250 WO HCPCS: Performed by: EMERGENCY MEDICINE

## 2024-07-12 PROCEDURE — 84443 ASSAY THYROID STIM HORMONE: CPT

## 2024-07-12 PROCEDURE — 6360000002 HC RX W HCPCS: Performed by: EMERGENCY MEDICINE

## 2024-07-12 PROCEDURE — 97161 PT EVAL LOW COMPLEX 20 MIN: CPT

## 2024-07-12 PROCEDURE — 36415 COLL VENOUS BLD VENIPUNCTURE: CPT

## 2024-07-12 PROCEDURE — G0103 PSA SCREENING: HCPCS

## 2024-07-12 PROCEDURE — 2580000003 HC RX 258: Performed by: INTERNAL MEDICINE

## 2024-07-12 PROCEDURE — 6370000000 HC RX 637 (ALT 250 FOR IP)

## 2024-07-12 PROCEDURE — 84100 ASSAY OF PHOSPHORUS: CPT

## 2024-07-12 PROCEDURE — 83735 ASSAY OF MAGNESIUM: CPT

## 2024-07-12 PROCEDURE — 2580000003 HC RX 258: Performed by: EMERGENCY MEDICINE

## 2024-07-12 PROCEDURE — 97535 SELF CARE MNGMENT TRAINING: CPT

## 2024-07-12 PROCEDURE — 85025 COMPLETE CBC W/AUTO DIFF WBC: CPT

## 2024-07-12 PROCEDURE — 93306 TTE W/DOPPLER COMPLETE: CPT | Performed by: SPECIALIST

## 2024-07-12 PROCEDURE — 80048 BASIC METABOLIC PNL TOTAL CA: CPT

## 2024-07-12 PROCEDURE — 93306 TTE W/DOPPLER COMPLETE: CPT

## 2024-07-12 PROCEDURE — 97165 OT EVAL LOW COMPLEX 30 MIN: CPT

## 2024-07-12 PROCEDURE — 99223 1ST HOSP IP/OBS HIGH 75: CPT | Performed by: INTERNAL MEDICINE

## 2024-07-12 PROCEDURE — 87040 BLOOD CULTURE FOR BACTERIA: CPT

## 2024-07-12 PROCEDURE — 94761 N-INVAS EAR/PLS OXIMETRY MLT: CPT

## 2024-07-12 PROCEDURE — 6370000000 HC RX 637 (ALT 250 FOR IP): Performed by: INTERNAL MEDICINE

## 2024-07-12 PROCEDURE — 82962 GLUCOSE BLOOD TEST: CPT

## 2024-07-12 RX ORDER — MAGNESIUM SULFATE IN WATER 40 MG/ML
2000 INJECTION, SOLUTION INTRAVENOUS ONCE
Status: COMPLETED | OUTPATIENT
Start: 2024-07-12 | End: 2024-07-12

## 2024-07-12 RX ORDER — INSULIN LISPRO 100 [IU]/ML
0-4 INJECTION, SOLUTION INTRAVENOUS; SUBCUTANEOUS
Status: DISCONTINUED | OUTPATIENT
Start: 2024-07-12 | End: 2024-07-20 | Stop reason: HOSPADM

## 2024-07-12 RX ORDER — POTASSIUM CHLORIDE 750 MG/1
40 TABLET, FILM COATED, EXTENDED RELEASE ORAL ONCE
Status: COMPLETED | OUTPATIENT
Start: 2024-07-12 | End: 2024-07-12

## 2024-07-12 RX ORDER — 0.9 % SODIUM CHLORIDE 0.9 %
500 INTRAVENOUS SOLUTION INTRAVENOUS ONCE
Status: COMPLETED | OUTPATIENT
Start: 2024-07-12 | End: 2024-07-12

## 2024-07-12 RX ORDER — INSULIN LISPRO 100 [IU]/ML
0-4 INJECTION, SOLUTION INTRAVENOUS; SUBCUTANEOUS NIGHTLY
Status: DISCONTINUED | OUTPATIENT
Start: 2024-07-12 | End: 2024-07-20 | Stop reason: HOSPADM

## 2024-07-12 RX ORDER — MIDODRINE HYDROCHLORIDE 5 MG/1
2.5 TABLET ORAL
Status: DISCONTINUED | OUTPATIENT
Start: 2024-07-12 | End: 2024-07-12

## 2024-07-12 RX ORDER — INSULIN LISPRO 100 [IU]/ML
3 INJECTION, SOLUTION INTRAVENOUS; SUBCUTANEOUS
Status: DISCONTINUED | OUTPATIENT
Start: 2024-07-12 | End: 2024-07-15

## 2024-07-12 RX ORDER — INSULIN GLARGINE 100 [IU]/ML
11 INJECTION, SOLUTION SUBCUTANEOUS DAILY
Status: DISCONTINUED | OUTPATIENT
Start: 2024-07-12 | End: 2024-07-15

## 2024-07-12 RX ORDER — MIDODRINE HYDROCHLORIDE 5 MG/1
5 TABLET ORAL
Status: DISCONTINUED | OUTPATIENT
Start: 2024-07-12 | End: 2024-07-20 | Stop reason: HOSPADM

## 2024-07-12 RX ORDER — SODIUM CHLORIDE 9 MG/ML
INJECTION, SOLUTION INTRAVENOUS CONTINUOUS
Status: DISCONTINUED | OUTPATIENT
Start: 2024-07-12 | End: 2024-07-12

## 2024-07-12 RX ORDER — POLYETHYLENE GLYCOL 3350 17 G/17G
17 POWDER, FOR SOLUTION ORAL DAILY
Status: DISCONTINUED | OUTPATIENT
Start: 2024-07-12 | End: 2024-07-18

## 2024-07-12 RX ORDER — SODIUM CHLORIDE, SODIUM LACTATE, POTASSIUM CHLORIDE, CALCIUM CHLORIDE 600; 310; 30; 20 MG/100ML; MG/100ML; MG/100ML; MG/100ML
INJECTION, SOLUTION INTRAVENOUS CONTINUOUS
Status: DISCONTINUED | OUTPATIENT
Start: 2024-07-12 | End: 2024-07-19

## 2024-07-12 RX ADMIN — DEXTROSE AND SODIUM CHLORIDE: 5; 450 INJECTION, SOLUTION INTRAVENOUS at 04:58

## 2024-07-12 RX ADMIN — INSULIN LISPRO 3 UNITS: 100 INJECTION, SOLUTION INTRAVENOUS; SUBCUTANEOUS at 13:08

## 2024-07-12 RX ADMIN — POLYETHYLENE GLYCOL 3350 17 G: 17 POWDER, FOR SOLUTION ORAL at 13:44

## 2024-07-12 RX ADMIN — SODIUM CHLORIDE, PRESERVATIVE FREE 10 ML: 5 INJECTION INTRAVENOUS at 08:05

## 2024-07-12 RX ADMIN — MIDODRINE HYDROCHLORIDE 2.5 MG: 5 TABLET ORAL at 13:43

## 2024-07-12 RX ADMIN — ACETAMINOPHEN 650 MG: 325 TABLET ORAL at 16:19

## 2024-07-12 RX ADMIN — MIDODRINE HYDROCHLORIDE 2.5 MG: 5 TABLET ORAL at 17:15

## 2024-07-12 RX ADMIN — HEPARIN SODIUM 5000 UNITS: 5000 INJECTION INTRAVENOUS; SUBCUTANEOUS at 23:02

## 2024-07-12 RX ADMIN — POTASSIUM CHLORIDE 40 MEQ: 750 TABLET, FILM COATED, EXTENDED RELEASE ORAL at 08:07

## 2024-07-12 RX ADMIN — HYDROCODONE BITARTRATE AND ACETAMINOPHEN 1 TABLET: 5; 325 TABLET ORAL at 16:11

## 2024-07-12 RX ADMIN — SODIUM CHLORIDE 500 ML: 9 INJECTION, SOLUTION INTRAVENOUS at 11:03

## 2024-07-12 RX ADMIN — CEFEPIME 1000 MG: 1 INJECTION, POWDER, FOR SOLUTION INTRAMUSCULAR; INTRAVENOUS at 17:18

## 2024-07-12 RX ADMIN — HEPARIN SODIUM 5000 UNITS: 5000 INJECTION INTRAVENOUS; SUBCUTANEOUS at 09:14

## 2024-07-12 RX ADMIN — VANCOMYCIN HYDROCHLORIDE 1000 MG: 1 INJECTION, POWDER, LYOPHILIZED, FOR SOLUTION INTRAVENOUS at 06:18

## 2024-07-12 RX ADMIN — POTASSIUM PHOSPHATE, MONOBASIC POTASSIUM PHOSPHATE, DIBASIC 30 MMOL: 224; 236 INJECTION, SOLUTION, CONCENTRATE INTRAVENOUS at 08:01

## 2024-07-12 RX ADMIN — SODIUM CHLORIDE, POTASSIUM CHLORIDE, SODIUM LACTATE AND CALCIUM CHLORIDE: 600; 310; 30; 20 INJECTION, SOLUTION INTRAVENOUS at 11:14

## 2024-07-12 RX ADMIN — INSULIN LISPRO 3 UNITS: 100 INJECTION, SOLUTION INTRAVENOUS; SUBCUTANEOUS at 17:14

## 2024-07-12 RX ADMIN — CEFAZOLIN SODIUM 2000 MG: 1 POWDER, FOR SOLUTION INTRAMUSCULAR; INTRAVENOUS at 17:41

## 2024-07-12 RX ADMIN — MAGNESIUM SULFATE HEPTAHYDRATE 2000 MG: 40 INJECTION, SOLUTION INTRAVENOUS at 09:06

## 2024-07-12 RX ADMIN — MIDODRINE HYDROCHLORIDE 5 MG: 5 TABLET ORAL at 19:09

## 2024-07-12 RX ADMIN — CEFEPIME 1000 MG: 1 INJECTION, POWDER, FOR SOLUTION INTRAMUSCULAR; INTRAVENOUS at 04:34

## 2024-07-12 RX ADMIN — INSULIN GLARGINE 11 UNITS: 100 INJECTION, SOLUTION SUBCUTANEOUS at 10:53

## 2024-07-12 ASSESSMENT — PAIN SCALES - GENERAL
PAINLEVEL_OUTOF10: 5
PAINLEVEL_OUTOF10: 0

## 2024-07-12 NOTE — PROGRESS NOTES
[facility] Dosing Services: Antimicrobial Stewardship Daily Doc  Consult for antibiotic dosing of Vancomycin by Dr. Mccarthy  Indication: Bloodstream Infection  Day of Therapy: 1    Ht Readings from Last 1 Encounters:   03/19/24 1.575 m (5' 2\")        Wt Readings from Last 1 Encounters:   07/11/24 41.2 kg (90 lb 13.3 oz)      Vancomycin therapy:  Loading dose: Vancomycin 1000 mg x1 dose now/given  Maintenance dose: by level  Dose calculated to approximate a           a. Target AUC/FARHAT of 400-600          b. Trough of 15-20 mcg/mL   Last level: na  Plan:   -AMOS, SCR= 1.93 (baseline SCR<1.0), T=99, WBC=26.2  -will order an AM random level for tomorrow.   -CBC/BMP x 3 days ordered    Dose administration notes: Doses given appropriately as scheduled    Non-Kinetic Antimicrobial Dosing Regimen:   Current Regimen:  cefepime 1 g q12h EI  Recommendation: continue same  Dose administration notes: Doses given appropriately as scheduled    Other Antimicrobial   (not dosed by pharmacist) na   Cultures 07/11 Bloodx2: GPC, preliminary  07/11 Blood PCR: staph aureus, final  07/11 urine culture ordered     Serum Creatinine Lab Results   Component Value Date/Time    CREATININE 1.93 07/12/2024 01:31 AM          Creatinine Clearance Estimated Creatinine Clearance: 25 mL/min (A) (based on SCr of 1.93 mg/dL (H)).     Temp Temp: 99 °F (37.2 °C)       WBC Lab Results   Component Value Date/Time    WBC 26.2 07/12/2024 01:31 AM          Procalcitonin No results found for: \"PROCAL\"   For Antifungals, Metronidazole and Nafcillin: Lab Results   Component Value Date/Time    ALT 20 07/11/2024 02:10 PM    AST 24 07/11/2024 02:10 PM        Pharmacist: Rina HernandezD, MS, BCPS    [phone]

## 2024-07-12 NOTE — PROGRESS NOTES
Echo with probable AV vegetation.    Have asked Cardiology team next week to arrange a VÍCTOR then.  Would keep NPO past midnight Sunday.

## 2024-07-12 NOTE — CONSULTS
Urine Negative NEG      Barbiturates, Urine Negative NEG      Benzodiazepines, Urine Negative NEG      Cocaine, Urine Negative NEG      Methadone, Urine Negative NEG      Opiates, Urine Negative NEG      Phencyclidine, Urine Negative NEG      THC, TH-Cannabinol, Urine Negative NEG      Comments: (NOTE)    Hemoglobin A1c    Collection Time: 07/11/24 10:40 PM   Result Value Ref Range    Hemoglobin A1C >14.0 (H) 4.0 - 5.6 %    Estimated Avg Glucose Cannot be calculated mg/dL   POCT Glucose    Collection Time: 07/11/24 11:11 PM   Result Value Ref Range    POC Glucose 206 (H) 65 - 117 mg/dL    Performed by: JORGE Higuera    POCT Glucose    Collection Time: 07/12/24 12:19 AM   Result Value Ref Range    POC Glucose 195 (H) 65 - 117 mg/dL    Performed by: Polly Andino    POCT Glucose    Collection Time: 07/12/24  1:25 AM   Result Value Ref Range    POC Glucose 151 (H) 65 - 117 mg/dL    Performed by: Polly Andino    Basic Metabolic Panel    Collection Time: 07/12/24  1:31 AM   Result Value Ref Range    Sodium 135 (L) 136 - 145 mmol/L    Potassium 3.0 (L) 3.5 - 5.1 mmol/L    Chloride 100 97 - 108 mmol/L    CO2 25 21 - 32 mmol/L    Anion Gap 10 5 - 15 mmol/L    Glucose 161 (H) 65 - 100 mg/dL    BUN 34 (H) 6 - 20 MG/DL    Creatinine 1.93 (H) 0.70 - 1.30 MG/DL    BUN/Creatinine Ratio 18 12 - 20      Est, Glom Filt Rate 40 (L) >60 ml/min/1.73m2    Calcium 9.0 8.5 - 10.1 MG/DL   Magnesium    Collection Time: 07/12/24  1:31 AM   Result Value Ref Range    Magnesium 1.7 1.6 - 2.4 mg/dL   Phosphorus    Collection Time: 07/12/24  1:31 AM   Result Value Ref Range    Phosphorus 2.2 (L) 2.6 - 4.7 MG/DL   TSH    Collection Time: 07/12/24  1:31 AM   Result Value Ref Range    TSH, 3rd Generation 0.74 0.36 - 3.74 uIU/mL   CBC with Auto Differential    Collection Time: 07/12/24  1:31 AM   Result Value Ref Range    WBC 26.2 (H) 4.1 - 11.1 K/uL    RBC 3.67 (L) 4.10 - 5.70 M/uL    Hemoglobin 11.5 (L) 12.1 - 17.0 g/dL    Hematocrit 32.5 (L)  22.7 (H) 1.8 - 8.0 K/UL    Lymphocytes Absolute 0.5 (L) 0.8 - 3.5 K/UL    Monocytes Absolute 1.5 (H) 0.0 - 1.0 K/UL    Eosinophils Absolute 0.3 0.0 - 0.4 K/UL    Basophils Absolute 0.0 0.0 - 0.1 K/UL    Immature Granulocytes Absolute 0.3 (H) 0.00 - 0.04 K/UL    Differential Type SMEAR SCANNED      RBC Comment NORMOCYTIC, NORMOCHROMIC     Magnesium    Collection Time: 07/12/24  7:38 AM   Result Value Ref Range    Magnesium 1.6 1.6 - 2.4 mg/dL   Phosphorus    Collection Time: 07/12/24  7:38 AM   Result Value Ref Range    Phosphorus 2.5 (L) 2.6 - 4.7 MG/DL   Extra Tubes Hold    Collection Time: 07/12/24  7:38 AM   Result Value Ref Range    Specimen HOld SST     Comment:        Add-on orders for these samples will be processed based on acceptable specimen integrity and analyte stability, which may vary by analyte.   POCT Glucose    Collection Time: 07/12/24  7:54 AM   Result Value Ref Range    POC Glucose 202 (H) 65 - 117 mg/dL    Performed by: Brittany Telles    POCT Glucose    Collection Time: 07/12/24  9:00 AM   Result Value Ref Range    POC Glucose 206 (H) 65 - 117 mg/dL    Performed by: Brittany Telles    POCT Glucose    Collection Time: 07/12/24 10:06 AM   Result Value Ref Range    POC Glucose 196 (H) 65 - 117 mg/dL    Performed by: Brittany Telles    POCT Glucose    Collection Time: 07/12/24 10:52 AM   Result Value Ref Range    POC Glucose 233 (H) 65 - 117 mg/dL    Performed by: Brittany Telles    POCT Glucose    Collection Time: 07/12/24 11:57 AM   Result Value Ref Range    POC Glucose 129 (H) 65 - 117 mg/dL    Performed by: Brittany Telles    Basic Metabolic Panel    Collection Time: 07/12/24 12:11 PM   Result Value Ref Range    Sodium 138 136 - 145 mmol/L    Potassium 3.6 3.5 - 5.1 mmol/L    Chloride 104 97 - 108 mmol/L    CO2 27 21 - 32 mmol/L    Anion Gap 7 5 - 15 mmol/L    Glucose 125 (H) 65 - 100 mg/dL    BUN 27 (H) 6 - 20 MG/DL    Creatinine 1.53 (H) 0.70 - 1.30 MG/DL    BUN/Creatinine Ratio 18 12 - 20      Katarina Segovia

## 2024-07-12 NOTE — CONSULTS
New Urology Consult Note    Patient: Steve Martinez MRN: [id number]  SSN: [id number]    YOB: 1970  Age: 53 y.o.  Sex: male            Plan:     Sepsis 2/2 pyelonephritis, AUR, AMOS   -Broad spectrum abx, adjust per final culture results   -Mendes placed for 1L 7/11/24. Continue with catheter in place for 7-10 days prior to consideration of VT.   -Regarding AMOS- bilateral hydro noted on CT imaging + AUR of 1L + lack of PO intake 4 days PTA. Mendes placed, continue hydration measures, monitor daily BMP. Renal US over the weekend to ensure improvement in hydronephrosis. If he were to clinically decline despite culture appropriate abx, obtain CT abd/pelv W con to r/o abscess formation pending improvement in renal function.     Discussed with Dr. He  Thank you for this consult. Please contact [facility] with any further questions/concerns.    History of Present Illness:     Chief Complaint:  without complaints     Steve Martinez is seen in consultation for reasons noted above at the request of Barbara Price MD. Admitted to hospital for DKA (diabetic ketoacidosis) ([facility])    This is a 53 y.o. male with a history of DM, neuropathy presented to ER with family with complaints of FTT, poor appetite with no PO intake x 4 days.    On arrival,   Febrile 103  WBC 25.5  Hgb 11.5  Cr 2.34  Glucose 531  LA 1.6  UA grossly infected   Mendes placed in ER for 1L  CT abd/pelv performed, images personally reviewed, revealing decompressed bladder with mendes in place, bilateral hydroureter, left renal swelling concerning for pyelonephritis     He was admitted to the hospital for DKA, sepsis 2/2 pyelonephritis.     He is in the ICU on an insulin drip. He is ill appearing. Mendes draining hazy urine with mild sediment in tubing, secured to statlock. He denies complaints at present. No CVA tenderness or abdominal tenderness. Limited penile/scrotal exam due to body  decompressed with Christiansen catheter in place. There is mild to moderate bilateral hydroureteronephrosis presumably residual from previous bladder distention. There is no urinary tract stone. There is left renal swelling and perirenal stranding which could be the result of previous obstructive hydroureteronephrosis but might represent acute pyelonephritis. Liver shows no apparent significant finding without contrast. Pancreas, adrenal glands, and spleen are unremarkable. Aorta is without aneurysm.  No inflammation is seen. There is no ascites, pneumoperitoneum or significant adenopathy. The bowels show no acute finding.     Decompressed bladder with catheter in place. Bilateral hydroureteronephrosis. Left renal swelling-question pyelonephritis. Electronically signed by [employer]      XR CHEST PORTABLE   Final Result   No Acute Disease.         Electronically signed by SOL PACVIOLETA      CT ABDOMEN PELVIS WO CONTRAST Additional Contrast? None   Final Result   Decompressed bladder with catheter in place.    Bilateral hydroureteronephrosis.    Left renal swelling-question pyelonephritis.      Electronically signed by SOL VORA            Signed By: ZAY Parra - NP  - July 12, 2024

## 2024-07-12 NOTE — CONSULTS
Comprehensive Nutrition Assessment    Type and Reason for Visit:  Initial, Consult    Nutrition Recommendations/Plan:   Modified diet: ETC, 4 Carb Choice diet order  Provide Glucerna TID (660 kcal, 78 g carbs, 30 g protein)  Encouraged patient to consider enteral support - NGT inpatient if PO <50% vs PEG for long term support (severely malnourished, unable to meet nutritional needs by mouth)  High risk for refeeding-  monitor K, Phos, Mg until stable x 3 days with nutrition, replace PRN  *If refeeding noted: MVI x 10 days, thiamine 100 mg x 10 days      Malnutrition Assessment:  Malnutrition Status:  Severe malnutrition (07/12/24 1101)    Context:  Chronic Illness     Findings of the 6 clinical characteristics of malnutrition:  Energy Intake:  No significant decrease in energy intake  Weight Loss:  No significant weight loss     Body Fat Loss:  Severe body fat loss Fat Overlying Ribs, Triceps, Orbital   Muscle Mass Loss:  Severe muscle mass loss Temples (temporalis), Clavicles (pectoralis & deltoids), Thigh (quadriceps), Calf (gastrocnemius)  Fluid Accumulation:  No significant fluid accumulation     Strength:  Not Performed    Nutrition Assessment:     Patient is a 53 year old male admitted with Urinary retention [R33.9]  Hyponatremia [E87.1]  Metabolic acidosis [E87.20]  Septicemia (HCC) [A41.9]  Failure to thrive in adult [R62.7]  AMOS (acute kidney injury) (HCC) [N17.9]  Ketoacidosis, diabetic, type 2, no coma (HCC) [E11.10]  Diabetic ketoacidosis without coma associated with type 1 diabetes mellitus (HCC) [E10.10]. He  has a past medical history of History of diabetes insipidus, History of weight loss, Neuropathy, and Uncontrolled type 2 diabetes mellitus with hyperglycemia (HCC).  RD consulted for ONS; patient with BMI 16.61 and was NPO this AM. DKA with admit BG of 564. On insulin drip - 3.4 u/hr, diet just ordered. RD modified diet to better fit patient needs. Patient with friend bedside, both provide  5 mg - acetaminophen      Current Nutrition Intake & Therapies:    Average Meal Intake: NPO  Average Supplements Intake: NPO  ADULT ORAL NUTRITION SUPPLEMENT; Breakfast, Lunch, Dinner; Diabetic Oral Supplement  ADULT DIET; Easy to Chew; 4 carb choices (60 gm/meal); Bread okay per RD    Anthropometric Measures:  Height: 157.5 cm (5' 2\")  Ideal Body Weight (IBW): 118 lbs (54 kg)    Admission Body Weight: 40.8 kg (90 lb)  Current Body Weight: 41.2 kg (90 lb 13.3 oz), 77 % IBW. Weight Source: Standing Scale  Current BMI (kg/m2): 16.6  Usual Body Weight: 40.8 kg (90 lb)  % Weight Change (Calculated): 0.9  Weight Adjustment For: No Adjustment                 BMI Categories: Underweight (BMI less than 18.5)    Estimated Daily Nutrient Needs:  Energy Requirements Based On: Kcal/kg  Weight Used for Energy Requirements: Ideal  Energy (kcal/day): 1120-7925 (25-30 kcal/kg IBW)  Weight Used for Protein Requirements: Ideal  Protein (g/day): 53 (1 g/kg IBW)  Method Used for Fluid Requirements: 1 ml/kcal  Fluid (ml/day): 4051-3216    Nutrition Diagnosis:   Severe malnutrition related to inadequate protein-energy intake, impaired nutrient utilization, endocrine dysfuntion as evidenced by weight loss, BMI, Criteria as identified in malnutrition assessment, severe muscle loss, severe loss of subcutaneous fat  Food & Nutrition-related knowledge deficit related to endocrine dysfuntion as evidenced by  (A1C 12.6%)    Nutrition Interventions:   Food and/or Nutrient Delivery: Modify Current Diet, Start Oral Nutrition Supplement  Nutrition Education/Counseling: Education initiated  Coordination of Nutrition Care: Continue to monitor while inpatient, Interdisciplinary Rounds  Plan of Care discussed with: IDR team, patient, CNS w/ Diabetes Management    Goals:     Goals: PO intake 50% or greater, by next RD assessment       Nutrition Monitoring and Evaluation:   Behavioral-Environmental Outcomes: Beliefs and Attitudes, Readiness for

## 2024-07-12 NOTE — CONSULTS
[facility]  DIABETES MANAGEMENT CONSULT    Consulted by Provider for advanced nursing evaluation and care for inpatient blood glucose management.    Evaluation and Action Plan   Steve Martinez is a 53 y.o. male with IGLESIA/Type 1 diabetes, who was admitted after poor po intake (did not eat for 4 days) and month of urinary and now bowel incontinence. Patient's sister was in room at time of visit and helped with history. Patient was originally diagnosed with Type 2 diabetes \"a few years ago\", but has no family history of diabetes and only weighed 120 pounds at that time. 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 He is currently on disability, but was told Medicaid would not be active for 2 years with this? He has not been consistent about seeing endocrinologist (Vandana Reyna) and has only been taking his Lantus 11 units at night. He does not have a fast acting insulin for meal coverage (not sure what happened to prescription?) and does not regularly check his BG. He has lost about 30 pounds since he was originally diagnosed with diabetes, but sister notes most of it was just recently. He is cachectic and nutritionally deprived; dietician following. He has also had incontinence of both his bladder and bowel (more recent). The urinary incontinence could be related to hyperglycemia and UTI, but could also have underlying urology issue. I discussed the vital importance of BG checks at home in order to know how to treat. I can get him a Freestyle Nini 3 to go home with, but no insurance to cover thereafter. This will at least get him through the next 14 days to monitor and get on track. He was agreeable to this. He has no teeth, so tends to high carb, soft foods. We discussed the absolute need for fast acting insulin to cover meals. Will check with CM for financial assist. A1c pending, but previous A1c's have been

## 2024-07-12 NOTE — CARE COORDINATION
Case Management Assessment  Initial Evaluation    Date/Time of Evaluation: 7/12/2024 4:41 PM  Assessment Completed by: ESTHER MCKEON RN    If patient is discharged prior to next notation, then this note serves as note for discharge by case management.    Patient Name: Steve Martinez                   YOB: 1970  Diagnosis: Urinary retention [R33.9]  Hyponatremia [E87.1]  Metabolic acidosis [E87.20]  Septicemia (HCC) [A41.9]  Failure to thrive in adult [R62.7]  AMOS (acute kidney injury) (HCC) [N17.9]  Ketoacidosis, diabetic, type 2, no coma (HCC) [E11.10]  Diabetic ketoacidosis without coma associated with type 1 diabetes mellitus (HCC) [E10.10]                   Date / Time: 7/11/2024  2:11 PM    Patient Admission Status: Inpatient   Readmission Risk (Low < 19, Mod (19-27), High > 27): Readmission Risk Score: 12.2    Current PCP: Rima Giron MD    Chart Reviewed: yes        Hospitalization in the last 30 days (Readmission):  no        Advance Directives:      Code Status: Full Code   Patient's Primary Decision Maker is:  emergency decision-maker is Melyssa Birch   Will request  to complete AMD      Discharge Planning:    Patient lives with: Alone Type of Home: House    Patient Support Systems include: Family Members   Current Financial resources:  no payor source,referred to [employer]  Current services prior to admission: None                     Type of Home Care services:  None      Financial    Payor: / referred to [facility] [employer]     Does insurance require precert for SNF: no insurance     Potential assistance Purchasing Medications:  yes  Meds-to-Beds request: no      [id number] [place], [place] - [address] - P [phone] - F [phone] [address] [facility] [address]  Phone: [phone] Fax: [phone]    [id number] - [place], [place] - [address] - P [phone] - F [phone] [address]  [facility] [address]  Phone: [phone] Fax: [phone]    [id number] - [place], [place] - [address] - P [phone] - F [phone]  [address] [facility] [address]  Phone: [phone] Fax: [phone]    [facility] - [place], [place] - [address] -  [phone] - F [phone]  [address] [address]  Phone: [phone] Fax: [phone]      Additional Case Management Notes:   Pt has a low readmission risk score of 12%  Referral sent to [facility] to please screen pt for medicaid and financial assistance  I received a referral for SNF  Pt does not currently have a payor source for SNF.    The Plan for Transition of Care is related to the following treatment goals of Urinary retention [R33.9]  Hyponatremia [E87.1]  Metabolic acidosis [E87.20]  Septicemia (HCC) [A41.9]  Failure to thrive in adult [R62.7]  AMOS (acute kidney injury) (HCC) [N17.9]  Ketoacidosis, diabetic, type 2, no coma (HCC) [E11.10]  Diabetic ketoacidosis without coma associated with type 1 diabetes mellitus (HCC) [E10.10]      ESTHER MCKEON RN  Case Management Department  [employer]

## 2024-07-12 NOTE — PLAN OF CARE
Problem: Physical Therapy - Adult  Goal: By Discharge: Performs mobility at highest level of function for planned discharge setting.  See evaluation for individualized goals.  Description: FUNCTIONAL STATUS PRIOR TO ADMISSION: Patient was independent and active without use of DME. Reports intermittent use of SPC. Family reports that patient was not meeting his own needs prior to admission. Family received in a chair, without clothing, unable to get up, and he had been using a basin on the floor instead of a bathroom.    HOME SUPPORT PRIOR TO ADMISSION: The patient lived alone but has support from a local father and sister.    Physical Therapy Goals  Initiated 7/12/2024  1.  Patient will move from supine to sit and sit to supine and scoot up and down in bed with modified independence within 7 day(s).    2.  Patient will perform sit to stand with supervision/set-up within 7 day(s).  3.  Patient will transfer from bed to chair and chair to bed with supervision/set-up using the least restrictive device within 7 day(s).  4.  Patient will ambulate with minimal assistance for 100 feet with the least restrictive device within 7 day(s).   5.  Patient will ascend/descend 4 stairs with 1 handrail(s) with supervision/set-up within 7 day(s).    Outcome: Progressing   PHYSICAL THERAPY EVALUATION    Patient: Steve Martinez (53 y.o. male)  Date: 7/12/2024  Primary Diagnosis: Urinary retention [R33.9]  Hyponatremia [E87.1]  Metabolic acidosis [E87.20]  Septicemia (HCC) [A41.9]  Failure to thrive in adult [R62.7]  AMOS (acute kidney injury) (HCC) [N17.9]  Ketoacidosis, diabetic, type 2, no coma (HCC) [E11.10]  Diabetic ketoacidosis without coma associated with type 1 diabetes mellitus (HCC) [E10.10]       Precautions:                        ASSESSMENT :   DEFICITS/IMPAIRMENTS:   The patient is limited by decreased functional mobility, strength, body mechanics, activity tolerance, balance     Based on the impairments listed above,  the patient presents far below his baseline following admission for failure to thrive. He has high medical complexity including hyponatremia, sepsis, AMOS, and uncontrolled diabetes. He reports independence to modified independence using a SPC at baseline but has had increasing ability managing around the home prior to admission. During eval, he required minimal assist overall for bed mobility and transfers. BP remained low but stable throughout the session. His BP elevated slightly with exertion. Despite ongoing questioning, he reported feeling \"woozy\" in standing but continued to deny dizziness. Standing balance was impaired d/t increased anterior/posterior sway and . He required increased time in static stance to accommodate before side stepping to HOB. Returned to supine with all needs met. Nsg at bedside throughout most of session and aware of low BP.    Patient will benefit from skilled intervention to address the above impairments.         PLAN :  Recommendations and Planned Interventions:   bed mobility training, transfer training, gait training, therapeutic exercises, and therapeutic activities    Frequency/Duration: Patient will be followed by physical therapy to address goals, PT Plan of Care: 5 times/week to address goals.        Recommendation for discharge: (in order for the patient to meet his/her long term goals): Therapy up to 5 days/week in Skilled nursing facility    Other factors to consider for discharge:  not managing his own needs within the home    IF patient discharges home will need the following DME: continuing to assess with progress                SUBJECTIVE:   Patient stated “If was fine until earlier this week.” (sister contacted post session and reported that his living situation is unclean and that observed care concerns have been ongoing)    OBJECTIVE DATA SUMMARY:       Past Medical History:   Diagnosis Date    History of diabetes insipidus     History of weight loss

## 2024-07-12 NOTE — PLAN OF CARE
Problem: Occupational Therapy - Adult  Goal: By Discharge: Performs self-care activities at highest level of function for planned discharge setting.  See evaluation for individualized goals.  Description: FUNCTIONAL STATUS PRIOR TO ADMISSION:  Patient reports being MOD I at baseline, has SPC as needed and manages his ADLs independently. 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    HOME SUPPORT: Patient lived alone with sister locally (30-45 min away) to provide support.    Occupational Therapy Goals:  Initiated 7/12/2024  1.  Patient will perform grooming, standing at sink for 2 minutes, with Modified [place] within 7 day(s).  2.  Patient will perform lower body dressing with Set-up and Supervision within 7 day(s).  3.  Patient will perform bathing with Supervision within 7 day(s).  4.  Patient will perform toilet transfers with Modified [place]  within 7 day(s).  5.  Patient will perform all aspects of toileting with Modified [place] within 7 day(s).  6.  Patient will participate in upper extremity therapeutic exercise/activities with [place] for 10 minutes within 7 day(s).    Outcome: Progressing     OCCUPATIONAL THERAPY EVALUATION    Patient: Steve Martinez (53 y.o. male)  Date: 7/12/2024  Primary Diagnosis: Urinary retention [R33.9]  Hyponatremia [E87.1]  Metabolic acidosis [E87.20]  Septicemia (HCC) [A41.9]  Failure to thrive in adult [R62.7]  AMOS (acute kidney injury) (HCC) [N17.9]  Ketoacidosis, diabetic, type 2, no coma (HCC) [E11.10]  Diabetic ketoacidosis without coma associated with type 1 diabetes mellitus (HCC) [E10.10]         Precautions:                    ASSESSMENT :  The patient is limited by decreased functional mobility, independence in ADLs, high-level IADLs, strength, sensation, activity tolerance, balance, and soft BP on day 1 of hospital admission for weakness, failure to thrive  EOB: min A x 1; HHA       Balance:      Balance  Sitting: Intact  Standing: Impaired  Standing - Static: Fair  Standing - Dynamic: Fair    ADL Assessment:   Feeding: Independent     Grooming: Setup  Grooming Skilled Clinical Factors: seated; infer CGA for standing tasks but unable to tolerate today due to BP/vitals    Skin Care: Chlorhexidine wipes;Bath wipes    UE Dressing: Setup;Based on clinical judgement     LE Dressing: Minimal assistance;Moderate assistance  LE Dressing Skilled Clinical Factors: donned socks in semi-supine; assist to don over toes, but able to pull up over heels with increased time; crossed leg technique; infer min A to manage pulling clothes up over hips    Toileting: Moderate assistance;Based on clinical judgement  Toileting Skilled Clinical Factors: based on observed balance, functional reach, coordination, and strength    ADL Intervention and task modifications:      [facility] AM-PAC \"6 Clicks\"                                                       Daily Activity Inpatient Short Form  How much help from another person does the patient currently need... Total; A Lot A Little None   1.  Putting on and taking off regular lower body clothing? []  1 [x]  2 []  3 []  4   2.  Bathing (including washing, rinsing, drying)? []  1 [x]  2 []  3 []  4   3.  Toileting, which includes using toilet, bedpan or urinal? [] 1 []  2 [x]  3 []  4   4.  Putting on and taking off regular upper body clothing? []  1 []  2 [x]  3 []  4   5.  Taking care of personal grooming such as brushing teeth? []  1 []  2 [x]  3 []  4   6.  Eating meals? []  1 []  2 []  3 [x]  4   © 2007, Trustees of [facility], under license to [employer]. All rights reserved     Score: 17/24     Interpretation of Tool:  Represents clinically-significant functional categories (i.e. Activities of daily living).    Cutoff score 39.4 (19) correlates to a good likelihood of discharging home versus a facility  Vandana Baca

## 2024-07-12 NOTE — PROGRESS NOTES
[facility]  [address] [place] [address] [phone]        Hospitalist Progress Note      NAME: Steve Martinez   :  1970  MRM:  [id number]    Date/Time: 2024  1:20 PM         Subjective:     Chief Complaint: \"I'm okay\"     Pt seen and examined. No complaints. AG closed and transitioning off insulin gtt.     ROS:  (bold if positive, if negative)           Objective:       Vitals:          Last 24hrs VS reviewed since prior progress note. Most recent are:    Vitals:    24 1200   BP: (!) 88/68   Pulse: 93   Resp: 18   Temp: 98.8 °F (37.1 °C)   SpO2: 99%     SpO2 Readings from Last 6 Encounters:   24 99%   24 98%   23 98%   06/15/23 99%          Intake/Output Summary (Last 24 hours) at 2024 1320  Last data filed at 2024 1200  Gross per 24 hour   Intake 1036.14 ml   Output 3710 ml   Net -2673.86 ml          Exam:     Physical Exam:    Gen: Frail elderly male. NAD   HEENT:  Pink conjunctivae, PERRL, hearing intact to voice, moist mucous membranes  Neck:  Supple, without masses, thyroid non-tender  Resp:  No accessory muscle use, clear breath sounds without wheezes rales or rhonchi  Card:  No murmurs, normal S1, S2 without thrills, bruits   Abd:  Soft, non-tender, non-distended, normoactive bowel sounds are present  Musc:  No cyanosis or clubbing  Skin:  No rashes or ulcers, skin turgor is good  Neuro: mild diffuse weakness   Psych:  Good insight, oriented to person, place and time, alert  Good rectal tone    Medications Reviewed: (see below)    Lab Data Reviewed: (see below)    ______________________________________________________________________    Medications:     Current Facility-Administered Medications   Medication Dose Route Frequency    potassium phosphate 30 mmol in sodium chloride 0.9 % 500 mL IVPB  30 mmol IntraVENous Once    Vancomycin Rx to Dose by Levels (AMOS)  1 each Other RX Placeholder    [START ON 2024]

## 2024-07-13 ENCOUNTER — APPOINTMENT (OUTPATIENT)
Facility: HOSPITAL | Age: 54
DRG: 871 | End: 2024-07-13

## 2024-07-13 LAB
ANION GAP SERPL CALC-SCNC: 10 MMOL/L (ref 5–15)
BACTERIA SPEC CULT: ABNORMAL
BASOPHILS # BLD: 0 K/UL (ref 0–0.1)
BASOPHILS NFR BLD: 0 % (ref 0–1)
BUN SERPL-MCNC: 26 MG/DL (ref 6–20)
BUN/CREAT SERPL: 19 (ref 12–20)
CALCIUM SERPL-MCNC: 8.5 MG/DL (ref 8.5–10.1)
CC UR VC: ABNORMAL
CHLORIDE SERPL-SCNC: 102 MMOL/L (ref 97–108)
CO2 SERPL-SCNC: 22 MMOL/L (ref 21–32)
CREAT SERPL-MCNC: 1.37 MG/DL (ref 0.7–1.3)
DIFFERENTIAL METHOD BLD: ABNORMAL
EOSINOPHIL # BLD: 0 K/UL (ref 0–0.4)
EOSINOPHIL NFR BLD: 0 % (ref 0–7)
ERYTHROCYTE [DISTWIDTH] IN BLOOD BY AUTOMATED COUNT: 12.5 % (ref 11.5–14.5)
GLUCOSE BLD STRIP.AUTO-MCNC: 130 MG/DL (ref 65–117)
GLUCOSE BLD STRIP.AUTO-MCNC: 195 MG/DL (ref 65–117)
GLUCOSE BLD STRIP.AUTO-MCNC: 215 MG/DL (ref 65–117)
GLUCOSE BLD STRIP.AUTO-MCNC: 291 MG/DL (ref 65–117)
GLUCOSE SERPL-MCNC: 258 MG/DL (ref 65–100)
HCT VFR BLD AUTO: 31.1 % (ref 36.6–50.3)
HGB BLD-MCNC: 10.7 G/DL (ref 12.1–17)
IMM GRANULOCYTES # BLD AUTO: 0.4 K/UL (ref 0–0.04)
IMM GRANULOCYTES NFR BLD AUTO: 2 % (ref 0–0.5)
LYMPHOCYTES # BLD: 0.6 K/UL (ref 0.8–3.5)
LYMPHOCYTES NFR BLD: 3 % (ref 12–49)
MAGNESIUM SERPL-MCNC: 2.4 MG/DL (ref 1.6–2.4)
MCH RBC QN AUTO: 31.4 PG (ref 26–34)
MCHC RBC AUTO-ENTMCNC: 34.4 G/DL (ref 30–36.5)
MCV RBC AUTO: 91.2 FL (ref 80–99)
MONOCYTES # BLD: 1 K/UL (ref 0–1)
MONOCYTES NFR BLD: 5 % (ref 5–13)
NEUTS SEG # BLD: 17.2 K/UL (ref 1.8–8)
NEUTS SEG NFR BLD: 90 % (ref 32–75)
NRBC # BLD: 0 K/UL (ref 0–0.01)
NRBC BLD-RTO: 0 PER 100 WBC
PHOSPHATE SERPL-MCNC: 2.2 MG/DL (ref 2.6–4.7)
PLATELET # BLD AUTO: 188 K/UL (ref 150–400)
PMV BLD AUTO: 10.8 FL (ref 8.9–12.9)
POTASSIUM SERPL-SCNC: 3.5 MMOL/L (ref 3.5–5.1)
RBC # BLD AUTO: 3.41 M/UL (ref 4.1–5.7)
RBC MORPH BLD: ABNORMAL
SERVICE CMNT-IMP: ABNORMAL
SODIUM SERPL-SCNC: 134 MMOL/L (ref 136–145)
VANCOMYCIN SERPL-MCNC: 8.4 UG/ML
WBC # BLD AUTO: 19.2 K/UL (ref 4.1–11.1)

## 2024-07-13 PROCEDURE — 76770 US EXAM ABDO BACK WALL COMP: CPT

## 2024-07-13 PROCEDURE — 6370000000 HC RX 637 (ALT 250 FOR IP): Performed by: INTERNAL MEDICINE

## 2024-07-13 PROCEDURE — 6370000000 HC RX 637 (ALT 250 FOR IP)

## 2024-07-13 PROCEDURE — 99232 SBSQ HOSP IP/OBS MODERATE 35: CPT | Performed by: INTERNAL MEDICINE

## 2024-07-13 PROCEDURE — 2580000003 HC RX 258: Performed by: INTERNAL MEDICINE

## 2024-07-13 PROCEDURE — 80048 BASIC METABOLIC PNL TOTAL CA: CPT

## 2024-07-13 PROCEDURE — 6360000002 HC RX W HCPCS: Performed by: INTERNAL MEDICINE

## 2024-07-13 PROCEDURE — 85025 COMPLETE CBC W/AUTO DIFF WBC: CPT

## 2024-07-13 PROCEDURE — 82962 GLUCOSE BLOOD TEST: CPT

## 2024-07-13 PROCEDURE — 6370000000 HC RX 637 (ALT 250 FOR IP): Performed by: STUDENT IN AN ORGANIZED HEALTH CARE EDUCATION/TRAINING PROGRAM

## 2024-07-13 PROCEDURE — 80202 ASSAY OF VANCOMYCIN: CPT

## 2024-07-13 PROCEDURE — 6360000002 HC RX W HCPCS: Performed by: STUDENT IN AN ORGANIZED HEALTH CARE EDUCATION/TRAINING PROGRAM

## 2024-07-13 PROCEDURE — 83735 ASSAY OF MAGNESIUM: CPT

## 2024-07-13 PROCEDURE — 2580000003 HC RX 258: Performed by: STUDENT IN AN ORGANIZED HEALTH CARE EDUCATION/TRAINING PROGRAM

## 2024-07-13 PROCEDURE — 94761 N-INVAS EAR/PLS OXIMETRY MLT: CPT

## 2024-07-13 PROCEDURE — 84100 ASSAY OF PHOSPHORUS: CPT

## 2024-07-13 PROCEDURE — 36415 COLL VENOUS BLD VENIPUNCTURE: CPT

## 2024-07-13 PROCEDURE — 1100000000 HC RM PRIVATE

## 2024-07-13 RX ORDER — GABAPENTIN 100 MG/1
100 CAPSULE ORAL 3 TIMES DAILY
Status: DISCONTINUED | OUTPATIENT
Start: 2024-07-13 | End: 2024-07-20 | Stop reason: HOSPADM

## 2024-07-13 RX ADMIN — INSULIN LISPRO 2 UNITS: 100 INJECTION, SOLUTION INTRAVENOUS; SUBCUTANEOUS at 12:18

## 2024-07-13 RX ADMIN — INSULIN LISPRO 1 UNITS: 100 INJECTION, SOLUTION INTRAVENOUS; SUBCUTANEOUS at 08:54

## 2024-07-13 RX ADMIN — HYDROCODONE BITARTRATE AND ACETAMINOPHEN 1 TABLET: 5; 325 TABLET ORAL at 17:03

## 2024-07-13 RX ADMIN — HEPARIN SODIUM 5000 UNITS: 5000 INJECTION INTRAVENOUS; SUBCUTANEOUS at 08:54

## 2024-07-13 RX ADMIN — VANCOMYCIN HYDROCHLORIDE 750 MG: 750 INJECTION, POWDER, LYOPHILIZED, FOR SOLUTION INTRAVENOUS at 12:23

## 2024-07-13 RX ADMIN — MIDODRINE HYDROCHLORIDE 5 MG: 5 TABLET ORAL at 12:17

## 2024-07-13 RX ADMIN — INSULIN LISPRO 3 UNITS: 100 INJECTION, SOLUTION INTRAVENOUS; SUBCUTANEOUS at 17:04

## 2024-07-13 RX ADMIN — SODIUM CHLORIDE, PRESERVATIVE FREE 10 ML: 5 INJECTION INTRAVENOUS at 08:55

## 2024-07-13 RX ADMIN — SODIUM CHLORIDE, POTASSIUM CHLORIDE, SODIUM LACTATE AND CALCIUM CHLORIDE: 600; 310; 30; 20 INJECTION, SOLUTION INTRAVENOUS at 18:59

## 2024-07-13 RX ADMIN — HEPARIN SODIUM 5000 UNITS: 5000 INJECTION INTRAVENOUS; SUBCUTANEOUS at 21:29

## 2024-07-13 RX ADMIN — GABAPENTIN 100 MG: 100 CAPSULE ORAL at 21:29

## 2024-07-13 RX ADMIN — CEFAZOLIN SODIUM 2000 MG: 1 POWDER, FOR SOLUTION INTRAMUSCULAR; INTRAVENOUS at 08:53

## 2024-07-13 RX ADMIN — CEFAZOLIN SODIUM 2000 MG: 1 POWDER, FOR SOLUTION INTRAMUSCULAR; INTRAVENOUS at 17:04

## 2024-07-13 RX ADMIN — INSULIN LISPRO 3 UNITS: 100 INJECTION, SOLUTION INTRAVENOUS; SUBCUTANEOUS at 08:54

## 2024-07-13 RX ADMIN — MIDODRINE HYDROCHLORIDE 5 MG: 5 TABLET ORAL at 08:55

## 2024-07-13 RX ADMIN — INSULIN GLARGINE 11 UNITS: 100 INJECTION, SOLUTION SUBCUTANEOUS at 08:54

## 2024-07-13 RX ADMIN — SODIUM CHLORIDE, PRESERVATIVE FREE 10 ML: 5 INJECTION INTRAVENOUS at 08:56

## 2024-07-13 RX ADMIN — MIDODRINE HYDROCHLORIDE 5 MG: 5 TABLET ORAL at 17:04

## 2024-07-13 RX ADMIN — CEFAZOLIN SODIUM 2000 MG: 1 POWDER, FOR SOLUTION INTRAMUSCULAR; INTRAVENOUS at 01:59

## 2024-07-13 RX ADMIN — GABAPENTIN 100 MG: 100 CAPSULE ORAL at 15:33

## 2024-07-13 RX ADMIN — INSULIN LISPRO 3 UNITS: 100 INJECTION, SOLUTION INTRAVENOUS; SUBCUTANEOUS at 12:17

## 2024-07-13 ASSESSMENT — PAIN DESCRIPTION - LOCATION: LOCATION: LEG

## 2024-07-13 ASSESSMENT — PAIN DESCRIPTION - ORIENTATION: ORIENTATION: LEFT

## 2024-07-13 ASSESSMENT — PAIN SCALES - GENERAL
PAINLEVEL_OUTOF10: 1
PAINLEVEL_OUTOF10: 5

## 2024-07-13 NOTE — PROGRESS NOTES
[facility] Dosing Services: Antimicrobial Stewardship Daily Doc  Consult for antibiotic dosing of Vancomycin by Dr. Mccarthy  Indication: Bloodstream Infection  Day of Therapy: 2    Ht Readings from Last 1 Encounters:   07/12/24 1.575 m (5' 2\")        Wt Readings from Last 1 Encounters:   07/12/24 40.8 kg (90 lb)      Vancomycin therapy:  Loading dose: Vancomycin 1000 mg x1 dose given 7/12 @ 0618  Maintenance dose: Dosing by random level  Dose calculated to approximate a           a. Target AUC/FARHAT of 400-600          b. Trough of 15-20 mcg/mL   Last level: 8.4 mcg/ml (drawn 7/13 @ 0600) ~ 24 hour level post dose (sub-therapeutic level)    Plan:   Scr now 1.37 (improving)  Ordered one time dose of vancomycin 750 mg x 1  will order an AM random level for tomorrow morning. Most likely can consider scheduled dosing now that AMOS looks to be resolving  CBC/BMP x 3 days ordered    Dose administration notes: Doses given appropriately as scheduled    Non-Kinetic Antimicrobial Dosing Regimen:   Current Regimen:  Cefazolin 2 g q 8h  Recommendation: continue same  Dose administration notes: Doses given appropriately as scheduled    Other Antimicrobial   (not dosed by pharmacist) na   Cultures 7/12 Blood Cx: GPC in 1/2 bottles - prelim  7/12 Blood Cx: NGTD - prelim  7/11 Blood Cx: probable staph aureus in 2/2 bottles - prelim  7/11 Blood Cx: probable staph aureus in 2/2 bottles - prelim  7/11 Blood PCR: staph aureus - F  7/11 Urine Cx: Staph aureus pan-sensi (> 100,000 colonies) - F     Serum Creatinine Lab Results   Component Value Date/Time    CREATININE 1.37 07/13/2024 06:00 AM          Creatinine Clearance Estimated Creatinine Clearance: 35 mL/min (A) (based on SCr of 1.37 mg/dL (H)).     Temp Temp: 98.1 °F (36.7 °C) (Oral)       WBC Lab Results   Component Value Date/Time    WBC 19.2 07/13/2024 06:00 AM          Procalcitonin No results found for: \"PROCAL\"   For Antifungals, Metronidazole and Nafcillin: Lab Results    Component Value Date/Time    ALT 15 07/12/2024 06:39 AM    AST 15 07/12/2024 06:39 AM        Pharmacist: Chandler Bledsoe [facility]  [phone]

## 2024-07-13 NOTE — PROGRESS NOTES
Urology Progress Note    Patient: Steve Martinez MRN: [id number]  SSN: [id number]    YOB: 1970  Age: 53 y.o.  Sex: male            Assessment:     Steve Martinez is a 53 y.o. male with a history of DM, neuropathy admitted for sepsis. Urology following for bilateral hydro/retention. Emaciated     ? Peylo with predominant organism staph aureus--> atypical   Hypotensive. Off pressors but BP 74/50 this am --> reports multiple episodes diarrhea overnight     WBC 19 (25)  Cr       Plan:     Retention: >1000mL, mendes placed 07/11/24. Continue mendes 7-10 days   AMOS: trending down. No new labs to review. Cr 1.5 yesterday. BMP ordered   Bilateral hydro on CV. Tip of mendes appears to be in or near right UO. Mendes repositioned  -continue supportive measures  -renal u/s ordered but uncertain if these are performed over the weekend. As long as he continues to improve can complete on Monday. If he deteriorates low threshold for repeating CT        Subjective:     Feeling better. Diarrhea     Objective:     BP (!) 74/50   Pulse 73   Temp 98.4 °F (36.9 °C) (Oral)   Resp 19   Ht 1.575 m (5' 2\")   Wt 40.8 kg (90 lb)   SpO2 96%   BMI 16.46 kg/m²       Intake/Output Summary (Last 24 hours) at 7/13/2024 0717  Last data filed at 7/13/2024 0000  Gross per 24 hour   Intake 1717.46 ml   Output 1136 ml   Net 581.46 ml       Physical Exam  General: NAD  HEENT: NC/AT, EOMI, MMM  Abdomen: soft, NTTP, nondistended, no suprapubic fullness or tenderness  : excellent UOP, no CVA tenderness. Mendes draining clear/yellow urine   Neuro: Appropriate, no focal neurological deficits  Mood/Affect: normal      Imaging:  XR CHEST PORTABLE    Result Date: 7/11/2024  No Acute Disease. Electronically signed by SOL VORA    CT ABDOMEN PELVIS WO CONTRAST Additional Contrast? None    Result Date: 7/11/2024  Decompressed bladder with catheter in place. Bilateral hydroureteronephrosis.

## 2024-07-13 NOTE — PROGRESS NOTES
[facility] Progress Note  Omar Seals DO  [phone] Office  [phone] Fax    2024      Assessment & Plan:     MSSA bacteremia.   Suspect due to head wound.  Echo raises concern for probable aortic valve vegetation.  Cardiology following.  VÍTCOR planned for next week  Serial blood cultures to document sterilization of the blood.  Patient will need PICC line and 6-week course of IV antibiotics in the setting of aortic valve endocarditis.  Narrow antibiotics to cefazolin  Bilateral hydroureteronephrosis with possible left sided pyelonephritis.  MSSA growing in urine culture.  This would be an unusual cause of UTI and may represent spillover from blood into the urine rather than source of infection.  Urology following  AMOS.  Due to above.  DKA.  Management per primary team  Poorly controlled diabetes mellitus.  Hemoglobin A1c greater than 14          Subjective:     No new complaints    Objective:     Vitals: /62   Pulse 83   Temp 97.9 °F (36.6 °C) (Oral)   Resp 16   Ht 1.575 m (5' 2\")   Wt 40.8 kg (90 lb)   SpO2 97%   BMI 16.46 kg/m²      Tmax:  Temp (24hrs), Av.8 °F (37.1 °C), Min:97.9 °F (36.6 °C), Max:100.7 °F (38.2 °C)      Exam:   Patient is intubated:  no    Physical Examination:   General:  Alert, cooperative, no distress   Head:  Normocephalic, atraumatic.   Eyes:  Conjunctivae clear   Neck: Supple       Lungs:   No distress.     Chest wall:     Heart:     Abdomen:    non-distended   Extremities: Moves all.     Skin: No acute rash on exposed skin   Neurologic: CNII-XII intact. Normal strength     Labs:        Invalid input(s): \"ITNL\"   No results for input(s): \"CPK\", \"CKMB\" in the last 72 hours.    Invalid input(s): \"TROIQ\"  Recent Labs     24  0131 24  0639 24  0738 24  1211 24  0600   *   < > 138 138 134*   K 3.0*   < > 3.1* 3.6 3.5      < > 101 104 102   CO2 25   < > 25 27 22   BUN 34*   < > 30* 27* 26*   PHOS

## 2024-07-13 NOTE — PROGRESS NOTES
[facility]                                                                               Hospitalist Progress Note  NEREIDA BOUCHER MD          Date of Service:  2024  NAME:  Steve Martinez  :  1970  MRN:  [id number]    Please note that this dictation was completed with [email or web address] the computer voice recognition software.  Quite often unanticipated grammatical, syntax, homophones, and other interpretive errors are inadvertently transcribed by the computer software.  Please disregard these errors.  Please excuse any errors that have escaped final proofreading.    Admission Summary:    53 y.o.   male with T1DM, neuropathy admitted for DKA, AMOS and severe sepsis. Over the last few days has had very little appetite and also was weak so was having a hard time getting up to get himself food/drinks etc. Denies ab pain. No N/V. Was constipated but this has resolved. No numbness in his groin or legs. No back pain.           Interval history / Subjective:   Reporting neuropathic pain in feet and hands      Assessment & Plan:     Anticipated discharge date : > 48 hours   Anticipated disposition :  Home with Home abx infusion  Barriers to discharge : medical stability      Severe sepsis - likely 2/2 UTI +/- pyelo, MSSA Bacteremia  MSSA Bacteremia   Blood cultures also (+) for staph. Awaiting speciation. Does have small lesion on his head. No IVDA. No recent surgeries/hardware/pacer etc. TTE showed 0.7 cm x 1 cm moderately sized probable vegetation present that is irregular     -repeat blood cultures pending   -ID following  -De-escalate to Cefazolin  -VÍCTOR next week          Hydroureter - likely 2/2 urinary retention  -urology on board  -cath in place; continue for 7-10 days [facility]   -renal US pending       Type 1 diabetes mellitus   DKA (diabetic ketoacidosis) (HCC) POA resolved    likely triggered by dehydration and infection. S/p insulin gtt  -s/p

## 2024-07-14 LAB
ANION GAP SERPL CALC-SCNC: 9 MMOL/L (ref 5–15)
BACTERIA SPEC CULT: ABNORMAL
BASOPHILS # BLD: 0.1 K/UL (ref 0–0.1)
BASOPHILS NFR BLD: 1 % (ref 0–1)
BUN SERPL-MCNC: 20 MG/DL (ref 6–20)
BUN/CREAT SERPL: 17 (ref 12–20)
CALCIUM SERPL-MCNC: 7.7 MG/DL (ref 8.5–10.1)
CHLORIDE SERPL-SCNC: 107 MMOL/L (ref 97–108)
CO2 SERPL-SCNC: 24 MMOL/L (ref 21–32)
CREAT SERPL-MCNC: 1.15 MG/DL (ref 0.7–1.3)
DIFFERENTIAL METHOD BLD: ABNORMAL
EOSINOPHIL # BLD: 0.1 K/UL (ref 0–0.4)
EOSINOPHIL NFR BLD: 1 % (ref 0–7)
ERYTHROCYTE [DISTWIDTH] IN BLOOD BY AUTOMATED COUNT: 12.3 % (ref 11.5–14.5)
GLUCOSE BLD STRIP.AUTO-MCNC: 121 MG/DL (ref 65–117)
GLUCOSE BLD STRIP.AUTO-MCNC: 135 MG/DL (ref 65–117)
GLUCOSE BLD STRIP.AUTO-MCNC: 202 MG/DL (ref 65–117)
GLUCOSE BLD STRIP.AUTO-MCNC: 227 MG/DL (ref 65–117)
GLUCOSE SERPL-MCNC: 71 MG/DL (ref 65–100)
HCT VFR BLD AUTO: 29.8 % (ref 36.6–50.3)
HGB BLD-MCNC: 10.2 G/DL (ref 12.1–17)
IMM GRANULOCYTES # BLD AUTO: 0.1 K/UL (ref 0–0.04)
IMM GRANULOCYTES NFR BLD AUTO: 1 % (ref 0–0.5)
LYMPHOCYTES # BLD: 0.9 K/UL (ref 0.8–3.5)
LYMPHOCYTES NFR BLD: 7 % (ref 12–49)
MAGNESIUM SERPL-MCNC: 2 MG/DL (ref 1.6–2.4)
MCH RBC QN AUTO: 31 PG (ref 26–34)
MCHC RBC AUTO-ENTMCNC: 34.2 G/DL (ref 30–36.5)
MCV RBC AUTO: 90.6 FL (ref 80–99)
MONOCYTES # BLD: 0.8 K/UL (ref 0–1)
MONOCYTES NFR BLD: 6 % (ref 5–13)
NEUTS SEG # BLD: 10.7 K/UL (ref 1.8–8)
NEUTS SEG NFR BLD: 85 % (ref 32–75)
NRBC # BLD: 0 K/UL (ref 0–0.01)
NRBC BLD-RTO: 0 PER 100 WBC
PHOSPHATE SERPL-MCNC: 1.8 MG/DL (ref 2.6–4.7)
PLATELET # BLD AUTO: 141 K/UL (ref 150–400)
PMV BLD AUTO: 9.8 FL (ref 8.9–12.9)
POTASSIUM SERPL-SCNC: 3.5 MMOL/L (ref 3.5–5.1)
RBC # BLD AUTO: 3.29 M/UL (ref 4.1–5.7)
SERVICE CMNT-IMP: ABNORMAL
SODIUM SERPL-SCNC: 140 MMOL/L (ref 136–145)
WBC # BLD AUTO: 12.6 K/UL (ref 4.1–11.1)

## 2024-07-14 PROCEDURE — 6370000000 HC RX 637 (ALT 250 FOR IP)

## 2024-07-14 PROCEDURE — 94761 N-INVAS EAR/PLS OXIMETRY MLT: CPT

## 2024-07-14 PROCEDURE — 82962 GLUCOSE BLOOD TEST: CPT

## 2024-07-14 PROCEDURE — 2580000003 HC RX 258: Performed by: INTERNAL MEDICINE

## 2024-07-14 PROCEDURE — 80048 BASIC METABOLIC PNL TOTAL CA: CPT

## 2024-07-14 PROCEDURE — 6360000002 HC RX W HCPCS: Performed by: INTERNAL MEDICINE

## 2024-07-14 PROCEDURE — 84100 ASSAY OF PHOSPHORUS: CPT

## 2024-07-14 PROCEDURE — 1100000000 HC RM PRIVATE

## 2024-07-14 PROCEDURE — 99232 SBSQ HOSP IP/OBS MODERATE 35: CPT | Performed by: INTERNAL MEDICINE

## 2024-07-14 PROCEDURE — 6370000000 HC RX 637 (ALT 250 FOR IP): Performed by: STUDENT IN AN ORGANIZED HEALTH CARE EDUCATION/TRAINING PROGRAM

## 2024-07-14 PROCEDURE — 36415 COLL VENOUS BLD VENIPUNCTURE: CPT

## 2024-07-14 PROCEDURE — 85025 COMPLETE CBC W/AUTO DIFF WBC: CPT

## 2024-07-14 PROCEDURE — 6370000000 HC RX 637 (ALT 250 FOR IP): Performed by: INTERNAL MEDICINE

## 2024-07-14 PROCEDURE — 83735 ASSAY OF MAGNESIUM: CPT

## 2024-07-14 RX ADMIN — SODIUM CHLORIDE, POTASSIUM CHLORIDE, SODIUM LACTATE AND CALCIUM CHLORIDE: 600; 310; 30; 20 INJECTION, SOLUTION INTRAVENOUS at 21:58

## 2024-07-14 RX ADMIN — CEFAZOLIN SODIUM 2000 MG: 1 POWDER, FOR SOLUTION INTRAMUSCULAR; INTRAVENOUS at 02:26

## 2024-07-14 RX ADMIN — CEFAZOLIN SODIUM 2000 MG: 1 POWDER, FOR SOLUTION INTRAMUSCULAR; INTRAVENOUS at 18:11

## 2024-07-14 RX ADMIN — GABAPENTIN 100 MG: 100 CAPSULE ORAL at 08:33

## 2024-07-14 RX ADMIN — SODIUM CHLORIDE, PRESERVATIVE FREE 10 ML: 5 INJECTION INTRAVENOUS at 22:02

## 2024-07-14 RX ADMIN — SODIUM CHLORIDE, PRESERVATIVE FREE 10 ML: 5 INJECTION INTRAVENOUS at 08:34

## 2024-07-14 RX ADMIN — HEPARIN SODIUM 5000 UNITS: 5000 INJECTION INTRAVENOUS; SUBCUTANEOUS at 08:32

## 2024-07-14 RX ADMIN — INSULIN LISPRO 3 UNITS: 100 INJECTION, SOLUTION INTRAVENOUS; SUBCUTANEOUS at 11:42

## 2024-07-14 RX ADMIN — CEFAZOLIN SODIUM 2000 MG: 1 POWDER, FOR SOLUTION INTRAMUSCULAR; INTRAVENOUS at 08:32

## 2024-07-14 RX ADMIN — SODIUM CHLORIDE, POTASSIUM CHLORIDE, SODIUM LACTATE AND CALCIUM CHLORIDE: 600; 310; 30; 20 INJECTION, SOLUTION INTRAVENOUS at 11:47

## 2024-07-14 RX ADMIN — SODIUM CHLORIDE, PRESERVATIVE FREE 10 ML: 5 INJECTION INTRAVENOUS at 22:01

## 2024-07-14 RX ADMIN — INSULIN LISPRO 1 UNITS: 100 INJECTION, SOLUTION INTRAVENOUS; SUBCUTANEOUS at 11:42

## 2024-07-14 RX ADMIN — INSULIN GLARGINE 11 UNITS: 100 INJECTION, SOLUTION SUBCUTANEOUS at 08:32

## 2024-07-14 RX ADMIN — MIDODRINE HYDROCHLORIDE 5 MG: 5 TABLET ORAL at 08:33

## 2024-07-14 RX ADMIN — MIDODRINE HYDROCHLORIDE 5 MG: 5 TABLET ORAL at 18:11

## 2024-07-14 RX ADMIN — MIDODRINE HYDROCHLORIDE 5 MG: 5 TABLET ORAL at 11:42

## 2024-07-14 RX ADMIN — HEPARIN SODIUM 5000 UNITS: 5000 INJECTION INTRAVENOUS; SUBCUTANEOUS at 21:58

## 2024-07-14 RX ADMIN — INSULIN LISPRO 3 UNITS: 100 INJECTION, SOLUTION INTRAVENOUS; SUBCUTANEOUS at 08:34

## 2024-07-14 RX ADMIN — GABAPENTIN 100 MG: 100 CAPSULE ORAL at 21:58

## 2024-07-14 RX ADMIN — GABAPENTIN 100 MG: 100 CAPSULE ORAL at 14:45

## 2024-07-14 NOTE — PROGRESS NOTES
[facility] Progress Note  Omar Seals DO  [phone] Office  [phone] Fax    2024      Assessment & Plan:     MSSA bacteremia.   Suspect due to head wound.  Echo raises concern for probable aortic valve vegetation.  Cardiology following.  VÍCTOR planned for tomorrow.  Blood cultures remain positive.  Repeat blood cultures in AM.  Continue cefazolin.  Vancomycin stopped.   Patient will need PICC line and 6-week course of IV antibiotics in the setting of aortic valve endocarditis.    Bilateral hydroureteronephrosis with possible left sided pyelonephritis.  MSSA growing in urine culture.  This would be an unusual cause of UTI and may represent spillover from blood into the urine rather than source of infection.  Urology following  AMOS.  Due to above.  DKA.  Management per primary team  Poorly controlled diabetes mellitus.  Hemoglobin A1c greater than 14          Subjective:     No new complaints    Objective:     Vitals: BP (!) 89/55   Pulse 79   Temp 98.2 °F (36.8 °C) (Oral)   Resp 18   Ht 1.575 m (5' 2\")   Wt 40.8 kg (90 lb)   SpO2 98%   BMI 16.46 kg/m²      Tmax:  Temp (24hrs), Av.9 °F (37.2 °C), Min:98.2 °F (36.8 °C), Max:100 °F (37.8 °C)      Exam:   Patient is intubated:  no    Physical Examination:   General:  Alert, cooperative, no distress   Head:  Normocephalic, atraumatic.   Eyes:  Conjunctivae clear   Neck: Supple       Lungs:   No distress.     Chest wall:     Heart:     Abdomen:    non-distended   Extremities: Moves all.     Skin: No acute rash on exposed skin   Neurologic: CNII-XII intact. Normal strength     Labs:        Invalid input(s): \"ITNL\"   No results for input(s): \"CPK\", \"CKMB\" in the last 72 hours.    Invalid input(s): \"TROIQ\"  Recent Labs     24  0738 24  1211 24  0600 24  0235    138 134* 140   K 3.1* 3.6 3.5 3.5    104 102 107   CO2 25 27 22 24   BUN 30* 27* 26* 20   PHOS 2.5* 3.6 2.2* 1.8*   MG 1.6 2.0 2.4

## 2024-07-14 NOTE — PROGRESS NOTES
[facility]                                                                               Hospitalist Progress Note  NEREIDA BOUCHER MD          Date of Service:  2024  NAME:  Steve Martinez  :  1970  MRN:  [id number]    Please note that this dictation was completed with [email or web address], the computer voice recognition software.  Quite often unanticipated grammatical, syntax, homophones, and other interpretive errors are inadvertently transcribed by the computer software.  Please disregard these errors.  Please excuse any errors that have escaped final proofreading.    Admission Summary:    53 y.o.   male with T1DM, neuropathy admitted for DKA, AMOS and severe sepsis. Over the last few days has had very little appetite and also was weak so was having a hard time getting up to get himself food/drinks etc. Denies ab pain. No N/V. Was constipated but this has resolved. No numbness in his groin or legs. No back pain.              Interval history / Subjective:   No acuet issues overnight. Sister Melyssa at the bedside      Assessment & Plan:       Anticipated discharge date : > 48 hours   Anticipated disposition :  Home with Home abx infusion  Barriers to discharge : medical stability      Severe sepsis - likely 2/2 UTI +/- pyelo, MSSA Bacteremia  MSSA Bacteremia   Blood cultures also (+) for staph. Awaiting speciation. Does have small lesion on his head. No IVDA. No recent surgeries/hardware/pacer etc. TTE showed 0.7 cm x 1 cm moderately sized probable vegetation present that is irregular      -repeat blood cultures pending   -ID following  -De-escalate to Cefazolin  -VÍCTOR next week            Hydroureter  likely 2/2 urinary retention  -urology on board  -cath in place; continue for 7-10 days [facility]           Type 1 diabetes mellitus   DKA (diabetic ketoacidosis) (HCC) POA resolved    likely triggered by dehydration and infection. S/p insulin gtt  -s/p  injection 1 mg  1 mg SubCUTAneous PRN    sodium chloride flush 0.9 % injection 5-40 mL  5-40 mL IntraVENous 2 times per day    sodium chloride flush 0.9 % injection 5-40 mL  5-40 mL IntraVENous PRN    heparin (porcine) injection 5,000 Units  5,000 Units SubCUTAneous BID    ondansetron (ZOFRAN-ODT) disintegrating tablet 4 mg  4 mg Oral Q8H PRN    Or    ondansetron (ZOFRAN) injection 4 mg  4 mg IntraVENous Q6H PRN    acetaminophen (TYLENOL) tablet 650 mg  650 mg Oral Q6H PRN    Or    acetaminophen (TYLENOL) suppository 650 mg  650 mg Rectal Q6H PRN    sodium chloride flush 0.9 % injection 5-40 mL  5-40 mL IntraVENous 2 times per day    sodium chloride flush 0.9 % injection 5-40 mL  5-40 mL IntraVENous PRN    ALPRAZolam (XANAX) tablet 0.25 mg  0.25 mg Oral 4x Daily PRN    HYDROcodone-acetaminophen (NORCO) 5-325 MG per tablet 1 tablet  1 tablet Oral Q6H PRN     ______________________________________________________________________  EXPECTED LENGTH OF STAY: 4  ACTUAL LENGTH OF STAY:          3                 NEREIDA BOUCHER MD

## 2024-07-15 LAB
ANION GAP SERPL CALC-SCNC: 9 MMOL/L (ref 5–15)
BUN SERPL-MCNC: 22 MG/DL (ref 6–20)
BUN/CREAT SERPL: 17 (ref 12–20)
CALCIUM SERPL-MCNC: 8 MG/DL (ref 8.5–10.1)
CHLORIDE SERPL-SCNC: 105 MMOL/L (ref 97–108)
CO2 SERPL-SCNC: 23 MMOL/L (ref 21–32)
CREAT SERPL-MCNC: 1.29 MG/DL (ref 0.7–1.3)
GLUCOSE BLD STRIP.AUTO-MCNC: 215 MG/DL (ref 65–117)
GLUCOSE BLD STRIP.AUTO-MCNC: 218 MG/DL (ref 65–117)
GLUCOSE BLD STRIP.AUTO-MCNC: 226 MG/DL (ref 65–117)
GLUCOSE BLD STRIP.AUTO-MCNC: 245 MG/DL (ref 65–117)
GLUCOSE SERPL-MCNC: 239 MG/DL (ref 65–100)
POTASSIUM SERPL-SCNC: 3.9 MMOL/L (ref 3.5–5.1)
SERVICE CMNT-IMP: ABNORMAL
SODIUM SERPL-SCNC: 137 MMOL/L (ref 136–145)

## 2024-07-15 PROCEDURE — 6370000000 HC RX 637 (ALT 250 FOR IP): Performed by: STUDENT IN AN ORGANIZED HEALTH CARE EDUCATION/TRAINING PROGRAM

## 2024-07-15 PROCEDURE — 2580000003 HC RX 258: Performed by: INTERNAL MEDICINE

## 2024-07-15 PROCEDURE — 99231 SBSQ HOSP IP/OBS SF/LOW 25: CPT

## 2024-07-15 PROCEDURE — 6370000000 HC RX 637 (ALT 250 FOR IP): Performed by: INTERNAL MEDICINE

## 2024-07-15 PROCEDURE — APPNB15 APP NON BILLABLE TIME 0-15 MINS: Performed by: NURSE PRACTITIONER

## 2024-07-15 PROCEDURE — 80048 BASIC METABOLIC PNL TOTAL CA: CPT

## 2024-07-15 PROCEDURE — 6360000002 HC RX W HCPCS: Performed by: INTERNAL MEDICINE

## 2024-07-15 PROCEDURE — 97530 THERAPEUTIC ACTIVITIES: CPT

## 2024-07-15 PROCEDURE — 1100000000 HC RM PRIVATE

## 2024-07-15 PROCEDURE — 94761 N-INVAS EAR/PLS OXIMETRY MLT: CPT

## 2024-07-15 PROCEDURE — 6370000000 HC RX 637 (ALT 250 FOR IP)

## 2024-07-15 PROCEDURE — 97116 GAIT TRAINING THERAPY: CPT

## 2024-07-15 PROCEDURE — 82962 GLUCOSE BLOOD TEST: CPT

## 2024-07-15 PROCEDURE — 36415 COLL VENOUS BLD VENIPUNCTURE: CPT

## 2024-07-15 RX ORDER — INSULIN LISPRO 100 [IU]/ML
4 INJECTION, SOLUTION INTRAVENOUS; SUBCUTANEOUS
Status: DISCONTINUED | OUTPATIENT
Start: 2024-07-15 | End: 2024-07-17

## 2024-07-15 RX ORDER — INSULIN GLARGINE 100 [IU]/ML
13 INJECTION, SOLUTION SUBCUTANEOUS DAILY
Status: DISCONTINUED | OUTPATIENT
Start: 2024-07-15 | End: 2024-07-16

## 2024-07-15 RX ADMIN — SODIUM CHLORIDE, PRESERVATIVE FREE 10 ML: 5 INJECTION INTRAVENOUS at 21:05

## 2024-07-15 RX ADMIN — INSULIN GLARGINE 13 UNITS: 100 INJECTION, SOLUTION SUBCUTANEOUS at 10:34

## 2024-07-15 RX ADMIN — INSULIN LISPRO 4 UNITS: 100 INJECTION, SOLUTION INTRAVENOUS; SUBCUTANEOUS at 17:27

## 2024-07-15 RX ADMIN — CEFAZOLIN SODIUM 2000 MG: 1 POWDER, FOR SOLUTION INTRAMUSCULAR; INTRAVENOUS at 08:29

## 2024-07-15 RX ADMIN — CEFAZOLIN SODIUM 2000 MG: 1 POWDER, FOR SOLUTION INTRAMUSCULAR; INTRAVENOUS at 01:43

## 2024-07-15 RX ADMIN — INSULIN LISPRO 1 UNITS: 100 INJECTION, SOLUTION INTRAVENOUS; SUBCUTANEOUS at 12:14

## 2024-07-15 RX ADMIN — INSULIN LISPRO 1 UNITS: 100 INJECTION, SOLUTION INTRAVENOUS; SUBCUTANEOUS at 17:27

## 2024-07-15 RX ADMIN — INSULIN LISPRO 4 UNITS: 100 INJECTION, SOLUTION INTRAVENOUS; SUBCUTANEOUS at 12:15

## 2024-07-15 RX ADMIN — MIDODRINE HYDROCHLORIDE 5 MG: 5 TABLET ORAL at 12:28

## 2024-07-15 RX ADMIN — CEFAZOLIN SODIUM 2000 MG: 1 POWDER, FOR SOLUTION INTRAMUSCULAR; INTRAVENOUS at 17:26

## 2024-07-15 RX ADMIN — HEPARIN SODIUM 5000 UNITS: 5000 INJECTION INTRAVENOUS; SUBCUTANEOUS at 21:18

## 2024-07-15 RX ADMIN — GABAPENTIN 100 MG: 100 CAPSULE ORAL at 08:27

## 2024-07-15 RX ADMIN — GABAPENTIN 100 MG: 100 CAPSULE ORAL at 14:28

## 2024-07-15 RX ADMIN — SODIUM CHLORIDE, POTASSIUM CHLORIDE, SODIUM LACTATE AND CALCIUM CHLORIDE: 600; 310; 30; 20 INJECTION, SOLUTION INTRAVENOUS at 08:38

## 2024-07-15 RX ADMIN — SODIUM CHLORIDE, POTASSIUM CHLORIDE, SODIUM LACTATE AND CALCIUM CHLORIDE: 600; 310; 30; 20 INJECTION, SOLUTION INTRAVENOUS at 21:24

## 2024-07-15 RX ADMIN — MIDODRINE HYDROCHLORIDE 5 MG: 5 TABLET ORAL at 08:26

## 2024-07-15 RX ADMIN — GABAPENTIN 100 MG: 100 CAPSULE ORAL at 21:18

## 2024-07-15 NOTE — PROGRESS NOTES
note. Most recent are:  Vitals:    07/15/24 0432   BP: 108/69   Pulse: 75   Resp: 15   Temp: 98.8 °F (37.1 °C)   SpO2: 96%         Intake/Output Summary (Last 24 hours) at 7/15/2024 0751  Last data filed at 7/15/2024 0711  Gross per 24 hour   Intake --   Output 2120 ml   Net -2120 ml        Physical Examination:     I had a face to face encounter with this patient and independently examined them on 7/15/2024 as outlined below:          General : alert x 3, awake, no acute distress,   HEENT: PEERL, EOMI, moist mucus membrane  Neck: supple, no JVD  Chest: Clear to auscultation bilaterally   CVS: S1 S2 heard, Capillary refill less than 2 seconds  Abd: soft/ non tender, non distended, BS physiological,   Ext: no clubbing, no cyanosis, no edema, brisk 2+ DP pulses  Neuro/Psych: pleasant mood and affect, moving all extremities spontaneously, no focal neurological deficit  Skin: warm     Data Review:    Review and/or order of clinical lab test  Review and/or order of tests in the radiology section of CPT  Review and/or order of tests in the medicine section of CPT  Discussion of test results with performing physician  I personally reviewed  Image and EKG/Monitor Tracing      I have personally and independently reviewed all pertinent labs, diagnostic studies, imaging, and have provided independent interpretation of the same.     Labs:     Recent Labs     07/13/24  0600 07/14/24  0235   WBC 19.2* 12.6*   HGB 10.7* 10.2*   HCT 31.1* 29.8*    141*     Recent Labs     07/12/24  1211 07/13/24  0600 07/14/24  0235 07/15/24  0201    134* 140 137   K 3.6 3.5 3.5 3.9    102 107 105   CO2 27 22 24 23   BUN 27* 26* 20 22*   MG 2.0 2.4 2.0  --    PHOS 3.6 2.2* 1.8*  --      No results for input(s): \"ALT\", \"TP\", \"GLOB\", \"GGT\" in the last 72 hours.    Invalid input(s): \"SGOT\", \"GPT\", \"AP\", \"TBIL\", \"TBILI\", \"ALB\", \"AML\", \"AMYP\", \"LPSE\", \"HLPSE\"  No results for input(s): \"INR\", \"APTT\" in the last 72 hours.    Invalid     dextrose bolus 10% 250 mL  250 mL IntraVENous PRN    glucagon injection 1 mg  1 mg SubCUTAneous PRN    sodium chloride flush 0.9 % injection 5-40 mL  5-40 mL IntraVENous 2 times per day    sodium chloride flush 0.9 % injection 5-40 mL  5-40 mL IntraVENous PRN    heparin (porcine) injection 5,000 Units  5,000 Units SubCUTAneous BID    ondansetron (ZOFRAN-ODT) disintegrating tablet 4 mg  4 mg Oral Q8H PRN    Or    ondansetron (ZOFRAN) injection 4 mg  4 mg IntraVENous Q6H PRN    acetaminophen (TYLENOL) tablet 650 mg  650 mg Oral Q6H PRN    Or    acetaminophen (TYLENOL) suppository 650 mg  650 mg Rectal Q6H PRN    sodium chloride flush 0.9 % injection 5-40 mL  5-40 mL IntraVENous 2 times per day    sodium chloride flush 0.9 % injection 5-40 mL  5-40 mL IntraVENous PRN    ALPRAZolam (XANAX) tablet 0.25 mg  0.25 mg Oral 4x Daily PRN    HYDROcodone-acetaminophen (NORCO) 5-325 MG per tablet 1 tablet  1 tablet Oral Q6H PRN     ______________________________________________________________________  EXPECTED LENGTH OF STAY: 4  ACTUAL LENGTH OF STAY:          4                 NEREIDA BOUCHER MD

## 2024-07-15 NOTE — WOUND CARE
Wound Care Note:     New consult for \"patient has wound on top of head that his sister pointed out\"   Seen in A383/01 with primary RN    53 y.o. y/o male admitted on 7/11/2024   Admitted for Urinary retention [R33.9]  Hyponatremia [E87.1]  Metabolic acidosis [E87.20]  Septicemia (HCC) [A41.9]  Failure to thrive in adult [R62.7]  AMOS (acute kidney injury) (HCC) [N17.9]  Ketoacidosis, diabetic, type 2, no coma (HCC) [E11.10]  Diabetic ketoacidosis without coma associated with type 1 diabetes mellitus (HCC) [E10.10]     History of type 1 diabetes, neuropathy   WBC = 12.6  No indication for wound culture  Diet: Diet NPO Exceptions are: Sips of Clear Liquids, Sips of Water with Meds, Ice Chips  ADULT DIET; Easy to Chew; 4 carb choices (60 gm/meal)  ADULT ORAL NUTRITION SUPPLEMENT; Lunch, Dinner, Breakfast; Standard High Calorie/High Protein Oral Supplement           Assessment:   Patient is alert, cooperative and reports no pain.  Mobile, assists in repositioning. Patient in recliner at time of visit.  Continent.    Surface: [place] bed with ROSALBA mattress    1. POA Scalp  3 x 3 cm  No open area, pink with surrounding scabs. Patient reports anxiety leading him to pick the skin in this area until scabs form and has done it for years.    Tx: Cleansed with NSS, left open to air.       No concerns to relay to provider.    Transition of Care: Please re-consult if needed.     Concha Hills RN  [facility] Inpatient Wound Care Department  Office [phone]  Available via [email or web address]

## 2024-07-15 NOTE — PLAN OF CARE
Problem: Occupational Therapy - Adult  Goal: By Discharge: Performs self-care activities at highest level of function for planned discharge setting.  See evaluation for individualized goals.  Description: FUNCTIONAL STATUS PRIOR TO ADMISSION:  Patient reports being MOD I at baseline, has SPC as needed and manages his ADLs independently. 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    HOME SUPPORT: Patient lived alone with sister locally (30-45 min away) to provide support.    Occupational Therapy Goals:  Initiated 7/12/2024  1.  Patient will perform grooming, standing at sink for 2 minutes, with Modified [place] within 7 day(s).  2.  Patient will perform lower body dressing with Set-up and Supervision within 7 day(s).  3.  Patient will perform bathing with Supervision within 7 day(s).  4.  Patient will perform toilet transfers with Modified [place]  within 7 day(s).  5.  Patient will perform all aspects of toileting with Modified [place] within 7 day(s).  6.  Patient will participate in upper extremity therapeutic exercise/activities with [place] for 10 minutes within 7 day(s).    Outcome: Progressing     OCCUPATIONAL THERAPY TREATMENT  Patient: Steve Martinez (53 y.o. male)  Date: 7/15/2024  Primary Diagnosis: Urinary retention [R33.9]  Hyponatremia [E87.1]  Metabolic acidosis [E87.20]  Septicemia (HCC) [A41.9]  Failure to thrive in adult [R62.7]  AMOS (acute kidney injury) (HCC) [N17.9]  Ketoacidosis, diabetic, type 2, no coma (HCC) [E11.10]  Diabetic ketoacidosis without coma associated with type 1 diabetes mellitus (HCC) [E10.10]       Precautions: fall  Chart, occupational therapy assessment, plan of care, and goals were reviewed.    ASSESSMENT  Patient is progressing in OT goals but remains limited by general weakness and impaired balance.  He reports he has been mobilizing to bathroom for toileting  using RW.  He was very motivated to ambulate in combs further than he did in PT earlier today, and he achieved this goal at [facility] with no LOB using RW.  Patient is motivated to improve function in order to d/c home, where he lives alone.  He remains below baseline but improving. Recommend HHOT vs SNF pending progress.          PLAN :  Patient continues to benefit from skilled intervention to address the above impairments.  Continue treatment per established plan of care to address goals.    Recommendation for discharge: (in order for the patient to meet his/her long term goals): HHOT vs SNF pending progress         SUBJECTIVE:   Patient stated “I want to walk further so I can show them I can go home.”    OBJECTIVE DATA SUMMARY:   Cognitive/Behavioral Status:  Orientation  Overall Orientation Status: Within Normal Limits  Orientation Level: Oriented X4       Functional Mobility and Transfers for ADLs:  Bed Mobility:  Bed Mobility Training  Bed Mobility Training: Yes  Rolling: Contact-guard assistance;Additional time  Supine to Sit: Contact-guard assistance;Additional time  Sit to Supine: Contact-guard assistance  Scooting: Stand-by assistance     Transfers:   Transfer Training  Transfer Training: Yes  Sit to Stand: Contact-guard assistance  Stand to Sit: Contact-guard assistance  Bed to Chair: Contact-guard assistance;Minimum assistance (up to Phil for LOB)           Balance:     Balance  Sitting: Intact  Standing: Impaired;With support  Standing - Static: Fair;Constant support  Standing - Dynamic: Fair;Constant support            Pain Rating:  Patient did not report pain      Activity Tolerance:   Good    After treatment:   Patient left in no apparent distress sitting up in chair, Call bell within reach, Bed/ chair alarm activated, and Caregiver / family present    COMMUNICATION/EDUCATION:   The patient's plan of care was discussed with: physical therapist and registered nurse         Thank you for this

## 2024-07-15 NOTE — CARE COORDINATION
7/15/2024 12:00 PM Pt discussed in IDR. Pt is uninsured. Referral sent for possible [facility] transfer for long term iv abx. Pt is uninsured.  RODO GalindoW

## 2024-07-15 NOTE — PROGRESS NOTES
Urology Progress Note    Patient: Steve Martinez MRN: [id number]  SSN: [id number]    YOB: 1970  Age: 53 y.o.  Sex: male        Assessment:     Resting in bed, NAD   Leukocytosis continues to improve  Creatinine normalized  Without complaints this morning.  Denies abdominal or flank pain  Mendes draining clear yellow urine    Plan:     MSSA bacteremia likely 2/2 head wound, echo with concerns for aortic valve vegetation, MSSA + UCX, peylo, AUR, AMOS   -Culture driven abx per ID  -Mendes placed for 1L 7/11/24. Continue with catheter in place for 7-10 days. Hold flomax due to persistent hypotension. HbA1c >14, possible diabetic cystopathy.   -TRISTAN 7/13 with resolution of L hydro, persistent R hydro. May need more time for  tract to decompress s/p mendes. Asymptomatic from flank pain, Cr normalized.     He is aware to call and arrange OP FU with VU for VT, ? UD study, repeat TRISTAN.    No further  interventions planned while IP  Please call if needed     ROS:     Denies fevers  Denies abdominal pain  Denies hematuria, frequency    Objective:     /68   Pulse 74   Temp 98.1 °F (36.7 °C) (Oral)   Resp 18   Ht 1.575 m (5' 2\")   Wt 44.9 kg (98 lb 15.8 oz)   SpO2 98%   BMI 18.10 kg/m²       Intake/Output Summary (Last 24 hours) at 7/15/2024 1024  Last data filed at 7/15/2024 0711  Gross per 24 hour   Intake --   Output 2120 ml   Net -2120 ml       Physical Exam  General: NAD  Respiratory: no distress, room air  Abdomen: soft, no distention  : no CVA tenderness, mendes draining clear yellow urine   Neuro: Appropriate, no focal neurological deficits    Labs reviewed, July 15, 2024  Recent Results (from the past 24 hour(s))   POCT Glucose    Collection Time: 07/14/24 10:53 AM   Result Value Ref Range    POC Glucose 227 (H) 65 - 117 mg/dL    Performed by: Jovanna Tapia (PCT) Angie    POCT Glucose    Collection Time: 07/14/24  4:29 PM   Result Value Ref

## 2024-07-15 NOTE — PLAN OF CARE
Problem: Physical Therapy - Adult  Goal: By Discharge: Performs mobility at highest level of function for planned discharge setting.  See evaluation for individualized goals.  Description: FUNCTIONAL STATUS PRIOR TO ADMISSION: Patient was independent and active without use of DME. Reports intermittent use of SPC. Family reports that patient was not meeting his own needs prior to admission. Family received in a chair, without clothing, unable to get up, and he had been using a basin on the floor instead of a bathroom.    HOME SUPPORT PRIOR TO ADMISSION: The patient lived alone but has support from a local father and sister.    Physical Therapy Goals  Initiated 7/12/2024  1.  Patient will move from supine to sit and sit to supine and scoot up and down in bed with modified independence within 7 day(s).    2.  Patient will perform sit to stand with supervision/set-up within 7 day(s).  3.  Patient will transfer from bed to chair and chair to bed with supervision/set-up using the least restrictive device within 7 day(s).  4.  Patient will ambulate with minimal assistance for 100 feet with the least restrictive device within 7 day(s).   5.  Patient will ascend/descend 4 stairs with 1 handrail(s) with supervision/set-up within 7 day(s).    Outcome: Progressing   PHYSICAL THERAPY TREATMENT    Patient: Steve Martinez (53 y.o. male)  Date: 7/15/2024  Diagnosis: Urinary retention [R33.9]  Hyponatremia [E87.1]  Metabolic acidosis [E87.20]  Septicemia (HCC) [A41.9]  Failure to thrive in adult [R62.7]  AMOS (acute kidney injury) (HCC) [N17.9]  Ketoacidosis, diabetic, type 2, no coma (HCC) [E11.10]  Diabetic ketoacidosis without coma associated with type 1 diabetes mellitus (HCC) [E10.10] DKA (diabetic ketoacidosis) (HCC)      Precautions:                        ASSESSMENT:  Patient continues to benefit from skilled PT services and is progressing towards goals. Patient this afternoon with good participation and tolerance to

## 2024-07-15 NOTE — CONSULTS
Consult received for VÍCTOR due to probable vegetation on TTE last week    07/11/24    ECHO (TTE) COMPLETE (PRN CONTRAST/BUBBLE/STRAIN/3D) 07/12/2024  4:39 PM (Final)    Interpretation Summary    Left Ventricle: Normal left ventricular systolic function with a visually estimated EF of 55 - 60%. Left ventricle size is normal. Normal wall thickness. Normal wall motion.    Aortic Valve: There is a 0.7 cm x 1 cm moderately sized probable vegetation present that is irregular.    Recommend a VÍCTOR to evaluate probable AV vegetation.    Signed by: Yohannes Medina MD on 7/12/2024  4:39 PM      Admitted with sepsis  Has MSSA bacteremia    Will plan for VÍCTOR 7/16 as unable to accommodate today due to space/staffing  Npo after midnight     Discussed with pt/family  Risk benefit reviewed  Agree to proceed

## 2024-07-16 ENCOUNTER — APPOINTMENT (OUTPATIENT)
Facility: HOSPITAL | Age: 54
DRG: 871 | End: 2024-07-16

## 2024-07-16 LAB
ANION GAP SERPL CALC-SCNC: 6 MMOL/L (ref 5–15)
BASOPHILS # BLD: 0.1 K/UL (ref 0–0.1)
BASOPHILS NFR BLD: 1 % (ref 0–1)
BUN SERPL-MCNC: 21 MG/DL (ref 6–20)
BUN/CREAT SERPL: 13 (ref 12–20)
CALCIUM SERPL-MCNC: 8.2 MG/DL (ref 8.5–10.1)
CHLORIDE SERPL-SCNC: 105 MMOL/L (ref 97–108)
CO2 SERPL-SCNC: 25 MMOL/L (ref 21–32)
CREAT SERPL-MCNC: 1.57 MG/DL (ref 0.7–1.3)
DIFFERENTIAL METHOD BLD: ABNORMAL
ECHO BSA: 1.4 M2
EOSINOPHIL # BLD: 0.2 K/UL (ref 0–0.4)
EOSINOPHIL NFR BLD: 2 % (ref 0–7)
ERYTHROCYTE [DISTWIDTH] IN BLOOD BY AUTOMATED COUNT: 12.4 % (ref 11.5–14.5)
GLUCOSE BLD STRIP.AUTO-MCNC: 132 MG/DL (ref 65–117)
GLUCOSE BLD STRIP.AUTO-MCNC: 184 MG/DL (ref 65–117)
GLUCOSE BLD STRIP.AUTO-MCNC: 185 MG/DL (ref 65–117)
GLUCOSE BLD STRIP.AUTO-MCNC: 249 MG/DL (ref 65–117)
GLUCOSE SERPL-MCNC: 234 MG/DL (ref 65–100)
HCT VFR BLD AUTO: 29.2 % (ref 36.6–50.3)
HGB BLD-MCNC: 9.7 G/DL (ref 12.1–17)
IMM GRANULOCYTES # BLD AUTO: 0 K/UL
IMM GRANULOCYTES NFR BLD AUTO: 0 %
LYMPHOCYTES # BLD: 0.9 K/UL (ref 0.8–3.5)
LYMPHOCYTES NFR BLD: 10 % (ref 12–49)
MAGNESIUM SERPL-MCNC: 1.7 MG/DL (ref 1.6–2.4)
MCH RBC QN AUTO: 31 PG (ref 26–34)
MCHC RBC AUTO-ENTMCNC: 33.2 G/DL (ref 30–36.5)
MCV RBC AUTO: 93.3 FL (ref 80–99)
MONOCYTES # BLD: 0.9 K/UL (ref 0–1)
MONOCYTES NFR BLD: 10 % (ref 5–13)
MYELOCYTES NFR BLD MANUAL: 1 %
NEUTS BAND NFR BLD MANUAL: 2 % (ref 0–6)
NEUTS SEG # BLD: 6.8 K/UL (ref 1.8–8)
NEUTS SEG NFR BLD: 74 % (ref 32–75)
NRBC # BLD: 0 K/UL (ref 0–0.01)
NRBC BLD-RTO: 0 PER 100 WBC
PHOSPHATE SERPL-MCNC: 2.1 MG/DL (ref 2.6–4.7)
PLATELET # BLD AUTO: 242 K/UL (ref 150–400)
PMV BLD AUTO: 9.7 FL (ref 8.9–12.9)
POTASSIUM SERPL-SCNC: 3.9 MMOL/L (ref 3.5–5.1)
RBC # BLD AUTO: 3.13 M/UL (ref 4.1–5.7)
RBC MORPH BLD: ABNORMAL
SERVICE CMNT-IMP: ABNORMAL
SODIUM SERPL-SCNC: 136 MMOL/L (ref 136–145)
WBC # BLD AUTO: 8.9 K/UL (ref 4.1–11.1)

## 2024-07-16 PROCEDURE — 80048 BASIC METABOLIC PNL TOTAL CA: CPT

## 2024-07-16 PROCEDURE — 6370000000 HC RX 637 (ALT 250 FOR IP)

## 2024-07-16 PROCEDURE — 94761 N-INVAS EAR/PLS OXIMETRY MLT: CPT

## 2024-07-16 PROCEDURE — 99152 MOD SED SAME PHYS/QHP 5/>YRS: CPT

## 2024-07-16 PROCEDURE — 36415 COLL VENOUS BLD VENIPUNCTURE: CPT

## 2024-07-16 PROCEDURE — 2580000003 HC RX 258: Performed by: INTERNAL MEDICINE

## 2024-07-16 PROCEDURE — 87040 BLOOD CULTURE FOR BACTERIA: CPT

## 2024-07-16 PROCEDURE — 83735 ASSAY OF MAGNESIUM: CPT

## 2024-07-16 PROCEDURE — B24BZZ4 ULTRASONOGRAPHY OF HEART WITH AORTA, TRANSESOPHAGEAL: ICD-10-PCS | Performed by: INTERNAL MEDICINE

## 2024-07-16 PROCEDURE — 6360000002 HC RX W HCPCS: Performed by: INTERNAL MEDICINE

## 2024-07-16 PROCEDURE — 6370000000 HC RX 637 (ALT 250 FOR IP): Performed by: INTERNAL MEDICINE

## 2024-07-16 PROCEDURE — 85025 COMPLETE CBC W/AUTO DIFF WBC: CPT

## 2024-07-16 PROCEDURE — 82962 GLUCOSE BLOOD TEST: CPT

## 2024-07-16 PROCEDURE — 84100 ASSAY OF PHOSPHORUS: CPT

## 2024-07-16 PROCEDURE — 93312 ECHO TRANSESOPHAGEAL: CPT

## 2024-07-16 PROCEDURE — 99152 MOD SED SAME PHYS/QHP 5/>YRS: CPT | Performed by: INTERNAL MEDICINE

## 2024-07-16 PROCEDURE — 99232 SBSQ HOSP IP/OBS MODERATE 35: CPT | Performed by: INTERNAL MEDICINE

## 2024-07-16 PROCEDURE — 1100000000 HC RM PRIVATE

## 2024-07-16 PROCEDURE — 6370000000 HC RX 637 (ALT 250 FOR IP): Performed by: STUDENT IN AN ORGANIZED HEALTH CARE EDUCATION/TRAINING PROGRAM

## 2024-07-16 PROCEDURE — 99231 SBSQ HOSP IP/OBS SF/LOW 25: CPT

## 2024-07-16 RX ORDER — LIDOCAINE 50 MG/G
OINTMENT TOPICAL PRN
Status: COMPLETED | OUTPATIENT
Start: 2024-07-16 | End: 2024-07-16

## 2024-07-16 RX ORDER — LIDOCAINE 50 MG/G
OINTMENT TOPICAL
Status: DISPENSED
Start: 2024-07-16 | End: 2024-07-16

## 2024-07-16 RX ORDER — MIDAZOLAM HYDROCHLORIDE 1 MG/ML
INJECTION INTRAMUSCULAR; INTRAVENOUS PRN
Status: COMPLETED | OUTPATIENT
Start: 2024-07-16 | End: 2024-07-16

## 2024-07-16 RX ORDER — MIDAZOLAM HYDROCHLORIDE 1 MG/ML
INJECTION INTRAMUSCULAR; INTRAVENOUS
Status: DISPENSED
Start: 2024-07-16 | End: 2024-07-16

## 2024-07-16 RX ORDER — CHOLESTYRAMINE LIGHT 4 G/5.7G
4 POWDER, FOR SUSPENSION ORAL DAILY
Status: DISCONTINUED | OUTPATIENT
Start: 2024-07-16 | End: 2024-07-18

## 2024-07-16 RX ORDER — LIDOCAINE HYDROCHLORIDE 20 MG/ML
JELLY TOPICAL PRN
Status: COMPLETED | OUTPATIENT
Start: 2024-07-16 | End: 2024-07-16

## 2024-07-16 RX ORDER — INSULIN GLARGINE 100 [IU]/ML
7 INJECTION, SOLUTION SUBCUTANEOUS ONCE
Status: COMPLETED | OUTPATIENT
Start: 2024-07-16 | End: 2024-07-16

## 2024-07-16 RX ORDER — INSULIN GLARGINE 100 [IU]/ML
14 INJECTION, SOLUTION SUBCUTANEOUS DAILY
Status: DISCONTINUED | OUTPATIENT
Start: 2024-07-16 | End: 2024-07-18

## 2024-07-16 RX ORDER — LIDOCAINE HYDROCHLORIDE 20 MG/ML
SOLUTION OROPHARYNGEAL PRN
Status: COMPLETED | OUTPATIENT
Start: 2024-07-16 | End: 2024-07-16

## 2024-07-16 RX ORDER — FENTANYL CITRATE 50 UG/ML
INJECTION, SOLUTION INTRAMUSCULAR; INTRAVENOUS PRN
Status: COMPLETED | OUTPATIENT
Start: 2024-07-16 | End: 2024-07-16

## 2024-07-16 RX ORDER — FENTANYL CITRATE 50 UG/ML
INJECTION, SOLUTION INTRAMUSCULAR; INTRAVENOUS
Status: DISPENSED
Start: 2024-07-16 | End: 2024-07-16

## 2024-07-16 RX ORDER — LIDOCAINE HYDROCHLORIDE 20 MG/ML
JELLY TOPICAL
Status: DISPENSED
Start: 2024-07-16 | End: 2024-07-16

## 2024-07-16 RX ORDER — LIDOCAINE HYDROCHLORIDE 20 MG/ML
SOLUTION OROPHARYNGEAL
Status: DISPENSED
Start: 2024-07-16 | End: 2024-07-16

## 2024-07-16 RX ADMIN — INSULIN LISPRO 1 UNITS: 100 INJECTION, SOLUTION INTRAVENOUS; SUBCUTANEOUS at 16:52

## 2024-07-16 RX ADMIN — MIDODRINE HYDROCHLORIDE 5 MG: 5 TABLET ORAL at 16:53

## 2024-07-16 RX ADMIN — CEFAZOLIN SODIUM 2000 MG: 1 POWDER, FOR SOLUTION INTRAMUSCULAR; INTRAVENOUS at 17:44

## 2024-07-16 RX ADMIN — SODIUM CHLORIDE, POTASSIUM CHLORIDE, SODIUM LACTATE AND CALCIUM CHLORIDE: 600; 310; 30; 20 INJECTION, SOLUTION INTRAVENOUS at 07:34

## 2024-07-16 RX ADMIN — SODIUM CHLORIDE, PRESERVATIVE FREE 10 ML: 5 INJECTION INTRAVENOUS at 08:44

## 2024-07-16 RX ADMIN — CEFAZOLIN SODIUM 2000 MG: 1 POWDER, FOR SOLUTION INTRAMUSCULAR; INTRAVENOUS at 09:29

## 2024-07-16 RX ADMIN — ACETAMINOPHEN 650 MG: 325 TABLET ORAL at 04:37

## 2024-07-16 RX ADMIN — CHOLESTYRAMINE 4 G: 4 POWDER, FOR SUSPENSION ORAL at 18:30

## 2024-07-16 RX ADMIN — LIDOCAINE HYDROCHLORIDE 15 ML: 20 SOLUTION ORAL at 11:28

## 2024-07-16 RX ADMIN — SODIUM CHLORIDE, PRESERVATIVE FREE 10 ML: 5 INJECTION INTRAVENOUS at 21:02

## 2024-07-16 RX ADMIN — HEPARIN SODIUM 5000 UNITS: 5000 INJECTION INTRAVENOUS; SUBCUTANEOUS at 08:43

## 2024-07-16 RX ADMIN — CEFAZOLIN SODIUM 2000 MG: 1 POWDER, FOR SOLUTION INTRAMUSCULAR; INTRAVENOUS at 03:29

## 2024-07-16 RX ADMIN — MIDODRINE HYDROCHLORIDE 5 MG: 5 TABLET ORAL at 13:38

## 2024-07-16 RX ADMIN — LIDOCAINE 5 ML: 50 OINTMENT TOPICAL at 11:42

## 2024-07-16 RX ADMIN — GABAPENTIN 100 MG: 100 CAPSULE ORAL at 14:58

## 2024-07-16 RX ADMIN — FENTANYL CITRATE 50 MCG: 50 INJECTION, SOLUTION INTRAMUSCULAR; INTRAVENOUS at 11:55

## 2024-07-16 RX ADMIN — MIDAZOLAM HYDROCHLORIDE 1 MG: 1 INJECTION, SOLUTION INTRAMUSCULAR; INTRAVENOUS at 11:55

## 2024-07-16 RX ADMIN — GABAPENTIN 100 MG: 100 CAPSULE ORAL at 08:43

## 2024-07-16 RX ADMIN — INSULIN LISPRO 4 UNITS: 100 INJECTION, SOLUTION INTRAVENOUS; SUBCUTANEOUS at 13:38

## 2024-07-16 RX ADMIN — MIDODRINE HYDROCHLORIDE 5 MG: 5 TABLET ORAL at 08:43

## 2024-07-16 RX ADMIN — HEPARIN SODIUM 5000 UNITS: 5000 INJECTION INTRAVENOUS; SUBCUTANEOUS at 21:01

## 2024-07-16 RX ADMIN — SODIUM CHLORIDE, POTASSIUM CHLORIDE, SODIUM LACTATE AND CALCIUM CHLORIDE: 600; 310; 30; 20 INJECTION, SOLUTION INTRAVENOUS at 17:54

## 2024-07-16 RX ADMIN — INSULIN LISPRO 4 UNITS: 100 INJECTION, SOLUTION INTRAVENOUS; SUBCUTANEOUS at 16:52

## 2024-07-16 RX ADMIN — INSULIN GLARGINE 7 UNITS: 100 INJECTION, SOLUTION SUBCUTANEOUS at 09:29

## 2024-07-16 RX ADMIN — LIDOCAINE HYDROCHLORIDE 5 ML: 20 JELLY TOPICAL at 11:58

## 2024-07-16 RX ADMIN — GABAPENTIN 100 MG: 100 CAPSULE ORAL at 21:02

## 2024-07-16 NOTE — PROGRESS NOTES
Comprehensive Nutrition Assessment    Type and Reason for Visit:  Reassess    Nutrition Recommendations/Plan:   Easy To Chew, 4 Carb Choice diet order - bread allowed if desired  Provide Glucerna TID (660 kcal, 78 g carbs, 30 g protein) - strawberry flavor  Evaluate for Digestive Enzyme needs or imodium?        Malnutrition Assessment:  Malnutrition Status:  Severe malnutrition (07/16/24 8677)    Context:  Chronic Illness     Findings of the 6 clinical characteristics of malnutrition:  Energy Intake:  No significant decrease in energy intake  Weight Loss:  No significant weight loss     Body Fat Loss:  Severe body fat loss Fat Overlying Ribs, Triceps   Muscle Mass Loss:  Severe muscle mass loss Temples (temporalis), Clavicles (pectoralis & deltoids), Thigh (quadriceps)  Fluid Accumulation:  No significant fluid accumulation     Strength:  Not Performed    Nutrition Assessment:     Patient is a 53 year old male admitted with Urinary retention [R33.9]  Hyponatremia [E87.1]  Metabolic acidosis [E87.20]  Septicemia (HCC) [A41.9]  Failure to thrive in adult [R62.7]  AMOS (acute kidney injury) (HCC) [N17.9]  Ketoacidosis, diabetic, type 2, no coma (HCC) [E11.10]  Diabetic ketoacidosis without coma associated with type 1 diabetes mellitus (HCC) [E10.10]. He  has a past medical history of History of diabetes insipidus, History of weight loss, Neuropathy, and Uncontrolled type 2 diabetes mellitus with hyperglycemia (HCC).  RD consulted for ONS; patient with BMI 16.61 and was NPO this AM. DKA with admit BG of 564. On insulin drip - 3.4 u/hr, diet just ordered. RD modified diet to better fit patient needs. Patient with friend bedside, both provide information. Patient reports no PO x 4 days due to 'feeling bad', denies abd pain, denies nausea/vomiting/diarrhea, denies chewing/swallowing difficulties. Stated he was too weak to move. States this is the first time this has happened. Reports being diagnosed with diabetes ~3  Encounters:   07/16/24 44.9 kg (98 lb 15.8 oz)   03/19/24 42.2 kg (93 lb)   06/30/23 39 kg (86 lb)   06/15/23 39 kg (86 lb)   01/06/23 39.5 kg (87 lb)   11/21/22 44.1 kg (97 lb 3.2 oz)   10/14/22 46.3 kg (102 lb)   10/04/22 44 kg (97 lb)   02/08/22 49 kg (108 lb)     Meal Intake:   Patient Vitals for the past 168 hrs:   PO Meals Eaten (%)   07/13/24 1000 51 - 75%     Supplement Intake:  No data found.    Nutrition Related Findings:      Wound Type: None     Last BM: 07/14/24  Edema: None                    Nutr. Labs:  Lab Results   Component Value Date    CREATININE 1.57 (H) 07/16/2024    BUN 21 (H) 07/16/2024     07/16/2024    K 3.9 07/16/2024     07/16/2024    CO2 25 07/16/2024     Lab Results   Component Value Date/Time    POCGLU 249 07/16/2024 03:54 PM    POCGLU 132 07/16/2024 12:43 PM    POCGLU 184 07/16/2024 06:34 AM    POCGLU 218 07/15/2024 08:54 PM    POCGLU 245 07/15/2024 04:27 PM    POCGLU 215 07/15/2024 10:32 AM      Hemoglobin A1C   Date Value Ref Range Status   07/11/2024 >14.0 (H) 4.0 - 5.6 % Final     Comment:     VERIFIED BY DILUTION  (NOTE)  HbA1C Interpretive Ranges  <5.7              Normal  5.7 - 6.4         Consider Prediabetes  >6.5              Consider Diabetes       Hemoglobin A1C, POC   Date Value Ref Range Status   06/15/2023 12.6 % Final     Lab Results   Component Value Date/Time    MG 1.7 07/16/2024 03:32 AM     Lab Results   Component Value Date    CALCIUM 8.2 (L) 07/16/2024    PHOS 2.1 (L) 07/16/2024     Lab Results   Component Value Date    TRIG 171 (H) 10/04/2022    TRIG 179 (H) 02/08/2022    TRIG 187 (H) 06/11/2020       Nutr. Meds:  Scheduled Meds:   insulin glargine  14 Units SubCUTAneous Daily    insulin lispro  4 Units SubCUTAneous TID WC    gabapentin  100 mg Oral TID    insulin lispro  0-4 Units SubCUTAneous TID WC    insulin lispro  0-4 Units SubCUTAneous Nightly    polyethylene glycol  17 g Oral Daily    ceFAZolin (ANCEF) IVPB  2,000 mg IntraVENous Q8H

## 2024-07-16 NOTE — PROGRESS NOTES
Rehab Note     Attempted to see patient this morning but currently JAIME for VÍCTOR.  Will follow later as able and appropriate.    Romina Gallego, MS, PT

## 2024-07-16 NOTE — CONSULTS
[facility]  DIABETES MANAGEMENT CONSULT    Consulted by Provider for advanced nursing evaluation and care for inpatient blood glucose management.    Evaluation and Action Plan   Steve Martinez is a 53 y.o. male with IGLESIA/Type 1 diabetes, who was admitted after poor po intake (did not eat for 4 days) and month of urinary and now bowel incontinence. Patient's sister was in room at time of visit and helped with history. Patient was originally diagnosed with Type 2 diabetes \"a few years ago\", but has no family history of diabetes and only weighed 120 pounds at that time. 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 He is currently on disability, but was told Medicaid would not be active for 2 years with this? He has not been consistent about seeing endocrinologist (Vandana Reyna) and has only been taking his Lantus 11 units at night. He does not have a fast acting insulin for meal coverage (not sure what happened to prescription?) and does not regularly check his BG. He has lost about 30 pounds since he was originally diagnosed with diabetes, but sister notes most of it was just recently. He is cachectic and nutritionally deprived; dietician following. He has also had incontinence of both his bladder and bowel (more recent). The urinary incontinence could be related to hyperglycemia and UTI, but could also have underlying urology issue. I discussed the vital importance of BG checks at home in order to know how to treat. I can get him a Freestyle Nini 3 to go home with, but no insurance to cover thereafter. This will at least get him through the next 14 days to monitor and get on track. He was agreeable to this. He has no teeth, so tends to high carb, soft foods. We discussed the absolute need for fast acting insulin to cover meals. Will check with CM for financial assist. A1c pending, but previous A1c's have been  pending    Subjective   ”I feel like my appetite is better.”     Objective   Physical exam  General Underweight male in no acute distress. Conversant and cooperative  Neuro  Alert, oriented   Vital Signs   Vitals:    07/16/24 0839   BP: 106/67   Pulse: 73   Resp: 16   Temp: 98.4 °F (36.9 °C)   SpO2: 97%       Diabetic foot exam: endorses neuropathy    Left Foot     Visual Exam:some old healed wounds   Pulse DP: Present  Right Foot   Visual Exam: some old healed wounds   Pulse DP: Present  Laboratory  Recent Labs     07/14/24  0235 07/16/24  0332   WBC 12.6* 8.9   HGB 10.2* 9.7*   HCT 29.8* 29.2*   MCV 90.6 93.3   * 242       Recent Labs     07/14/24  0235 07/15/24  0201 07/16/24  0332    137 136   K 3.5 3.9 3.9    105 105   CO2 24 23 25   PHOS 1.8*  --  2.1*   BUN 20 22* 21*   CREATININE 1.15 1.29 1.57*       Lab Results   Component Value Date    ALT 15 07/12/2024    AST 15 07/12/2024    ALKPHOS 95 07/12/2024    BILITOT 0.5 07/12/2024     Lab Results   Component Value Date    TSH 0.74 07/12/2024     Lab Results   Component Value Date    LABA1C >14.0 (H) 07/11/2024    LABA1C 13.9 (H) 07/11/2024    LABA1C 8.2 (H) 01/06/2023       Factors impacting BG management  Factor Dose Comments   Nutrition:  Standard meals     60 grams carbs/meal    Infection Ancef  UTI   Other:   Kidney function  Liver function   AMOS (resolved)  Normal       Blood glucose pattern    Significant diabetes-related events over the past 24-72 hours  BG range 184-245 last 24 hours    Assessment and Nursing Intervention   Nursing Diagnosis Risk for unstable blood glucose pattern   Nursing Intervention Domain 5255 Decision-making Support   Nursing Interventions Examined current inpatient diabetes/blood glucose control   Explored factors facilitating and impeding inpatient management  Explored corrective strategies with patient and responsible inpatient provider   Informed patient of rational for insulin strategy while hospitalized

## 2024-07-16 NOTE — PLAN OF CARE
Problem: Discharge Planning  Goal: Discharge to home or other facility with appropriate resources  7/16/2024 1218 by Chula Love RN  Outcome: Progressing  7/16/2024 0803 by Ian Quiroga RN  Outcome: Progressing     Problem: Safety - Adult  Goal: Free from fall injury  7/16/2024 1218 by Chula Love RN  Outcome: Progressing  7/16/2024 0803 by Ian Quiroga RN  Outcome: Progressing     Problem: Skin/Tissue Integrity  Goal: Absence of new skin breakdown  Description: 1.  Monitor for areas of redness and/or skin breakdown  2.  Assess vascular access sites hourly  3.  Every 4-6 hours minimum:  Change oxygen saturation probe site  4.  Every 4-6 hours:  If on nasal continuous positive airway pressure, respiratory therapy assess nares and determine need for appliance change or resting period.  7/16/2024 1218 by Chula Love RN  Outcome: Progressing  7/16/2024 0803 by Ian Quiroga RN  Outcome: Progressing     Problem: ABCDS Injury Assessment  Goal: Absence of physical injury  Outcome: Progressing     Problem: Nutrition Deficit:  Goal: Optimize nutritional status  7/16/2024 1218 by Chula Love RN  Outcome: Progressing  7/16/2024 0803 by Ian Quiroga RN  Outcome: Progressing     Problem: Chronic Conditions and Co-morbidities  Goal: Patient's chronic conditions and co-morbidity symptoms are monitored and maintained or improved  Outcome: Progressing     Problem: Pain  Goal: Verbalizes/displays adequate comfort level or baseline comfort level  Outcome: Progressing

## 2024-07-16 NOTE — CARE COORDINATION
7/16/2024 11:09 AM Met with pt and pt's Attending at bedside, discussed need for long term iv abx and possible [facility] placement. Pt open to [facility] placement but his preference is to have iv abx at home Pt understands returning home with iv abx is not a feasible option at this time.    WILLEM Galindo    Care Management Progress Note    Admitting diagnosis:  Urinary retention [R33.9]  Hyponatremia [E87.1]  Metabolic acidosis [E87.20]  Septicemia (HCC) [A41.9]  Failure to thrive in adult [R62.7]  AMOS (acute kidney injury) (HCC) [N17.9]  Ketoacidosis, diabetic, type 2, no coma (HCC) [E11.10]  Diabetic ketoacidosis without coma associated with type 1 diabetes mellitus (HCC) [E10.10]    Admit Date:  7/11/2024  2:11 PM    Readmission: No  RUR:  10%  Risk Level: [x]Low []Moderate []High    Transition of care plan:  Ongoing medical management  ID and Cardiology consulted  VÍCTOR today  Pt will need picc placed prior to discharge   PT and OT consulted: Yes  Anticipated discharge plan: Transfer to [facility] for long term iv abx  Pt is uninsured, pt confirmed he met with [facility] for Medicaid screening  Outpatient follow-up.  Discharge transport: [facility] transport if accepted by [facility] for transfer

## 2024-07-16 NOTE — PROCEDURES
BRIEF PROCEDURE NOTE    Date of Procedure: 7/16/2024   Preoperative Diagnosis: Bacteremia  Postoperative Diagnosis: No echo evidence of endocarditis    Procedure:VÍCTOR  Cardiologist: Noble Fu MD  Anesthesia: local (benzocaine spray, Viscous lidocaine gargle) + IV sedation (Midazolam  mg, Fentanyl  mcg)  Estimated Blood Loss: Minimal    Informed consent obtained prior to procedure. Appropriate time out performed.    Findings:     MV: Nml; Trace MR  TV: Nml  AV: Trileaflet; Mild sclerosis; No AS/AR  PV: Nml    LA: Nml  DARVIN - no thrombus  RA: Nml  RV: Nml  LV: Nml  LVEF: 55-60%  IVS-bubble study-with Valsalva-few bubbles crossed across IAS.  No bubbles crossed over at rest.    Aorta : Nml size; Mild Atheroma    Complications:  None, no oropharyngeal trauma, VSS at end of procedure.    Noble Fu. MD, FACC

## 2024-07-16 NOTE — PROGRESS NOTES
TRANSFER - IN REPORT:    Verbal report received from chart on Steve Martinez  being received from 383for ordered procedure      Report consisted of patient’s Situation, Background, Assessment and   Recommendations(SBAR).     Information from the following report(s) Nurse Handoff Report was reviewed with the receiving nurse.    Opportunity for questions and clarification was provided.      Assessment completed upon patient’s arrival to unit and care assume

## 2024-07-16 NOTE — PROGRESS NOTES
TRANSFER - OUT REPORT:    Verbal report given to JACKSON Quiros(name) on Steve Martinez being transferred to [facility](unit) for routine post-op       Report consisted of patient's Situation, Background, Assessment and   Recommendations(SBAR).     Information from the following report(s) Nurse Handoff Report, Recent Results, and Event Log was reviewed with the receiving nurse.    Opportunity for questions and clarification was provided.      Patient transported with:   Tech

## 2024-07-16 NOTE — PLAN OF CARE
Problem: Discharge Planning  Goal: Discharge to home or other facility with appropriate resources  Outcome: Progressing     Problem: Safety - Adult  Goal: Free from fall injury  Outcome: Progressing     Problem: Skin/Tissue Integrity  Goal: Absence of new skin breakdown  Description: 1.  Monitor for areas of redness and/or skin breakdown  2.  Assess vascular access sites hourly  3.  Every 4-6 hours minimum:  Change oxygen saturation probe site  4.  Every 4-6 hours:  If on nasal continuous positive airway pressure, respiratory therapy assess nares and determine need for appliance change or resting period.  Outcome: Progressing     Problem: Nutrition Deficit:  Goal: Optimize nutritional status  Outcome: Progressing

## 2024-07-16 NOTE — PROGRESS NOTES
[facility]                                                                               Hospitalist Progress Note  NEREIDA BOUCHER MD          Date of Service:  2024  NAME:  Steve Martinez  :  1970  MRN:  [id number]    Please note that this dictation was completed with [employer], the computer voice recognition software.  Quite often unanticipated grammatical, syntax, homophones, and other interpretive errors are inadvertently transcribed by the computer software.  Please disregard these errors.  Please excuse any errors that have escaped final proofreading.    Admission Summary:   53 y.o.   male with T1DM, neuropathy admitted for DKA, AMOS and severe sepsis. Over the last few days has had very little appetite and also was weak so was having a hard time getting up to get himself food/drinks etc. Denies ab pain. No N/V. Was constipated but this has resolved. No numbness in his groin or legs. No back pain.              Interval history / Subjective:   NPO for VÍCTOR. Reports explosive diarrhea immediately after eating for several months      Assessment & Plan:     Anticipated discharge date : TBD   Anticipated disposition : Cannot return home with IV abx or go to SNF due to lack of insurance. Agreeable to go to [facility] if accepted   Barriers to discharge : medical stability          Severe sepsis - likely 2/2 UTI +/- pyelo, MSSA Bacteremia  MSSA Bacteremia   Blood cultures also (+) for staph. Awaiting speciation. Does have small lesion on his head. No IVDA. No recent surgeries/hardware/pacer etc. TTE showed 0.7 cm x 1 cm moderately sized probable vegetation present that is irregular      -repeat blood cultures pending   -ID following  -Cefazolin changed to Nafcillin  -Will need PICC and 6 week course of IV abx   -VÍCTOR did not show valvular vegetation       Hydroureter  likely 2/2 urinary retention  -urology on board  -cath in place; continue for 7-10 days

## 2024-07-16 NOTE — PROGRESS NOTES
[facility] Progress Note  Omar Seals DO  [phone] Office  [phone] Fax    2024      Assessment & Plan:     MSSA bacteremia.   Suspect due to head wound.  Echo raises concern for probable aortic valve vegetation.  Cardiology following.  VÍCTOR planned for today..  Blood cultures remain positive.  Repeat blood cultures drawn today. Continue cefazolin.  Blood cultures remain positive will change antibiotics to nafcillin.    Patient will need PICC line and 6-week course of IV antibiotics in the setting of aortic valve endocarditis.    Bilateral hydroureteronephrosis with possible left sided pyelonephritis.  MSSA growing in urine culture.  This would be an unusual cause of UTI and may represent spillover from blood into the urine rather than source of infection.  Urology following  AMOS.  Due to above.  DKA.  Management per primary team  Poorly controlled diabetes mellitus.  Hemoglobin A1c greater than 14          Subjective:     No new complaints    Objective:     Vitals: /67   Pulse 73   Temp 98.4 °F (36.9 °C) (Oral)   Resp 16   Ht 1.575 m (5' 2\")   Wt 44.9 kg (98 lb 15.8 oz)   SpO2 97%   BMI 18.10 kg/m²      Tmax:  Temp (24hrs), Av °F (37.2 °C), Min:98.1 °F (36.7 °C), Max:101.3 °F (38.5 °C)      Exam:   Patient is intubated:  no    Physical Examination:   General:  Alert, cooperative, no distress   Head:  Normocephalic, atraumatic.   Eyes:  Conjunctivae clear   Neck: Supple       Lungs:   No distress.     Chest wall:     Heart:     Abdomen:    non-distended   Extremities: Moves all.     Skin: No acute rash on exposed skin   Neurologic: CNII-XII intact. Normal strength     Labs:        Invalid input(s): \"ITNL\"   No results for input(s): \"CPK\", \"CKMB\" in the last 72 hours.    Invalid input(s): \"TROIQ\"  Recent Labs     24  0235 07/15/24  0201 24  0332    137 136   K 3.5 3.9 3.9    105 105   CO2 24 23 25   BUN 20 22* 21*   PHOS 1.8*  --   2.1*   MG 2.0  --  1.7   WBC 12.6*  --  8.9   HGB 10.2*  --  9.7*   HCT 29.8*  --  29.2*   *  --  242       No results for input(s): \"INR\", \"APTT\" in the last 72 hours.    Invalid input(s): \"PTP\"  Needs: urine analysis, urine sodium, protein and creatinine  No results found for: \"KU\"      Cultures:     No results found for: \"SDES\"  No components found for: \"CULT\"    Radiology:     Medications       @[facility]        Case discussed with:       Omar Seals DO

## 2024-07-17 PROBLEM — E10.65 TYPE 1 DIABETES MELLITUS WITH HYPERGLYCEMIA (HCC): Status: ACTIVE | Noted: 2024-07-17

## 2024-07-17 LAB
BACTERIA SPEC CULT: ABNORMAL
GLUCOSE BLD STRIP.AUTO-MCNC: 114 MG/DL (ref 65–117)
GLUCOSE BLD STRIP.AUTO-MCNC: 146 MG/DL (ref 65–117)
GLUCOSE BLD STRIP.AUTO-MCNC: 292 MG/DL (ref 65–117)
GLUCOSE BLD STRIP.AUTO-MCNC: 329 MG/DL (ref 65–117)
GLUCOSE BLD STRIP.AUTO-MCNC: 95 MG/DL (ref 65–117)
SERVICE CMNT-IMP: ABNORMAL
SERVICE CMNT-IMP: NORMAL
SERVICE CMNT-IMP: NORMAL

## 2024-07-17 PROCEDURE — 82962 GLUCOSE BLOOD TEST: CPT

## 2024-07-17 PROCEDURE — 2580000003 HC RX 258: Performed by: INTERNAL MEDICINE

## 2024-07-17 PROCEDURE — 97110 THERAPEUTIC EXERCISES: CPT

## 2024-07-17 PROCEDURE — 94761 N-INVAS EAR/PLS OXIMETRY MLT: CPT

## 2024-07-17 PROCEDURE — 97535 SELF CARE MNGMENT TRAINING: CPT

## 2024-07-17 PROCEDURE — 6370000000 HC RX 637 (ALT 250 FOR IP)

## 2024-07-17 PROCEDURE — 6370000000 HC RX 637 (ALT 250 FOR IP): Performed by: INTERNAL MEDICINE

## 2024-07-17 PROCEDURE — 6370000000 HC RX 637 (ALT 250 FOR IP): Performed by: STUDENT IN AN ORGANIZED HEALTH CARE EDUCATION/TRAINING PROGRAM

## 2024-07-17 PROCEDURE — 6360000002 HC RX W HCPCS: Performed by: INTERNAL MEDICINE

## 2024-07-17 PROCEDURE — 97116 GAIT TRAINING THERAPY: CPT

## 2024-07-17 PROCEDURE — 99231 SBSQ HOSP IP/OBS SF/LOW 25: CPT

## 2024-07-17 PROCEDURE — 1100000000 HC RM PRIVATE

## 2024-07-17 RX ORDER — INSULIN LISPRO 100 [IU]/ML
6 INJECTION, SOLUTION INTRAVENOUS; SUBCUTANEOUS
Status: DISCONTINUED | OUTPATIENT
Start: 2024-07-17 | End: 2024-07-19

## 2024-07-17 RX ADMIN — GABAPENTIN 100 MG: 100 CAPSULE ORAL at 08:30

## 2024-07-17 RX ADMIN — INSULIN GLARGINE 14 UNITS: 100 INJECTION, SOLUTION SUBCUTANEOUS at 08:31

## 2024-07-17 RX ADMIN — CHOLESTYRAMINE 4 G: 4 POWDER, FOR SUSPENSION ORAL at 09:51

## 2024-07-17 RX ADMIN — CEFAZOLIN SODIUM 2000 MG: 1 POWDER, FOR SOLUTION INTRAMUSCULAR; INTRAVENOUS at 02:00

## 2024-07-17 RX ADMIN — HEPARIN SODIUM 5000 UNITS: 5000 INJECTION INTRAVENOUS; SUBCUTANEOUS at 08:30

## 2024-07-17 RX ADMIN — SODIUM CHLORIDE, PRESERVATIVE FREE 10 ML: 5 INJECTION INTRAVENOUS at 08:30

## 2024-07-17 RX ADMIN — MIDODRINE HYDROCHLORIDE 5 MG: 5 TABLET ORAL at 17:26

## 2024-07-17 RX ADMIN — HEPARIN SODIUM 5000 UNITS: 5000 INJECTION INTRAVENOUS; SUBCUTANEOUS at 20:45

## 2024-07-17 RX ADMIN — INSULIN LISPRO 3 UNITS: 100 INJECTION, SOLUTION INTRAVENOUS; SUBCUTANEOUS at 12:55

## 2024-07-17 RX ADMIN — SODIUM CHLORIDE, PRESERVATIVE FREE 10 ML: 5 INJECTION INTRAVENOUS at 21:17

## 2024-07-17 RX ADMIN — GABAPENTIN 100 MG: 100 CAPSULE ORAL at 20:45

## 2024-07-17 RX ADMIN — INSULIN LISPRO 6 UNITS: 100 INJECTION, SOLUTION INTRAVENOUS; SUBCUTANEOUS at 12:54

## 2024-07-17 RX ADMIN — MIDODRINE HYDROCHLORIDE 5 MG: 5 TABLET ORAL at 08:30

## 2024-07-17 RX ADMIN — CEFAZOLIN SODIUM 2000 MG: 1 POWDER, FOR SOLUTION INTRAMUSCULAR; INTRAVENOUS at 09:51

## 2024-07-17 RX ADMIN — GABAPENTIN 100 MG: 100 CAPSULE ORAL at 14:59

## 2024-07-17 RX ADMIN — CEFAZOLIN SODIUM 2000 MG: 1 POWDER, FOR SOLUTION INTRAMUSCULAR; INTRAVENOUS at 17:21

## 2024-07-17 RX ADMIN — INSULIN LISPRO 4 UNITS: 100 INJECTION, SOLUTION INTRAVENOUS; SUBCUTANEOUS at 08:31

## 2024-07-17 RX ADMIN — INSULIN LISPRO 2 UNITS: 100 INJECTION, SOLUTION INTRAVENOUS; SUBCUTANEOUS at 08:31

## 2024-07-17 NOTE — PLAN OF CARE
Problem: Physical Therapy - Adult  Goal: By Discharge: Performs mobility at highest level of function for planned discharge setting.  See evaluation for individualized goals.  Description: FUNCTIONAL STATUS PRIOR TO ADMISSION: Patient was independent and active without use of DME. Reports intermittent use of SPC. Family reports that patient was not meeting his own needs prior to admission. Family received in a chair, without clothing, unable to get up, and he had been using a basin on the floor instead of a bathroom.    HOME SUPPORT PRIOR TO ADMISSION: The patient lived alone but has support from a local father and sister.    Physical Therapy Goals  Initiated 7/12/2024  1.  Patient will move from supine to sit and sit to supine and scoot up and down in bed with modified independence within 7 day(s).    2.  Patient will perform sit to stand with supervision/set-up within 7 day(s).  3.  Patient will transfer from bed to chair and chair to bed with supervision/set-up using the least restrictive device within 7 day(s).  4.  Patient will ambulate with minimal assistance for 100 feet with the least restrictive device within 7 day(s).   5.  Patient will ascend/descend 4 stairs with 1 handrail(s) with supervision/set-up within 7 day(s).    Outcome: Progressing     PHYSICAL THERAPY TREATMENT    Patient: Steve Martinez (54 y.o. male)  Date: 7/17/2024  Diagnosis: Urinary retention [R33.9]  Hyponatremia [E87.1]  Metabolic acidosis [E87.20]  Septicemia (HCC) [A41.9]  Failure to thrive in adult [R62.7]  AMOS (acute kidney injury) (HCC) [N17.9]  Ketoacidosis, diabetic, type 2, no coma (HCC) [E11.10]  Diabetic ketoacidosis without coma associated with type 1 diabetes mellitus (HCC) [E10.10] DKA (diabetic ketoacidosis) ([facility])      Precautions:  (falls)                      ASSESSMENT:  Pt tolerated PT services well and continues to progress toward PT POC goals. Progress to date less than anticipated 2/2 underlying medical status

## 2024-07-17 NOTE — PROGRESS NOTES
[facility]                                                                               Hospitalist Progress Note  Chaim Fontaine MD          Date of Service:  2024  NAME:  Steve Martinez  :  1970  MRN:  [id number]    Please note that this dictation was completed with [employer], the computer voice recognition software.  Quite often unanticipated grammatical, syntax, homophones, and other interpretive errors are inadvertently transcribed by the computer software.  Please disregard these errors.  Please excuse any errors that have escaped final proofreading.    Admission Summary:   53 y.o.   male with T1DM, neuropathy admitted for DKA, AMOS and severe sepsis. Over the last few days has had very little appetite and also was weak so was having a hard time getting up to get himself food/drinks etc. Denies ab pain. No N/V. Was constipated but this has resolved. No numbness in his groin or legs. No back pain.              Interval history / Subjective:     Medically stable awaiting placement  Continue IV antibiotic pharmacy bacteremia     Assessment & Plan:     Anticipated discharge date : TBD   Anticipated disposition : Cannot return home with IV abx or go to SNF due to lack of insurance. Agreeable to go to [facility] if accepted   Barriers to discharge : medical stability          Severe sepsis - likely 2/2 UTI +/- pyelo, MSSA Bacteremia  MSSA Bacteremia   Blood cultures 2024 for and 2024 positive for MSSA.  Blood cultures 2024 no growth to date.  No IVDA. No recent surgeries/hardware/pacer etc. TTE showed 0.7 cm x 1 cm moderately sized probable vegetation present that is irregular. Repeat BCX NGT. VÍCTOR no vegetation seen      -ID following  -Continue Ancef every 8 hours  -PICC line prior to discharge  -Patient to complete 6 weeks of IV antibiotic from last negative blood cultures         Hydroureter  likely 2/2 urinary retention  -urology on  palpitations, orthopnea, PND, edema, syncope   Gastrointestinal:  abdominal pain , N/V, diarrhea, dysphagia, constipation, bleeding   Genitourinary:  frequency, urgency, dysuria, hematuria, incontinence   Muskuloskeletal :  arthralgia, myalgia, back pain  Hematology:  easy bruising, nose or gum bleeding, lymphadenopathy   Dermatological: rash, ulceration, pruritis, color change / jaundice  Endocrine:   hot flashes or polydipsia   Neurological:  headache, dizziness, confusion, focal weakness, paresthesia,     Speech difficulties, memory loss, gait difficulty  Psychological: Feelings of anxiety, depression, agitation      Vital Signs:    Last 24hrs VS reviewed since prior progress note. Most recent are:  Vitals:    07/17/24 0306   BP: 107/65   Pulse: 77   Resp: 18   Temp: 97.9 °F (36.6 °C)   SpO2: 97%         Intake/Output Summary (Last 24 hours) at 7/17/2024 0811  Last data filed at 7/17/2024 0612  Gross per 24 hour   Intake --   Output 3850 ml   Net -3850 ml        Physical Examination:     I had a face to face encounter with this patient and independently examined them on 7/17/2024 as outlined below:          General : alert x 3, awake, no acute distress,   HEENT: PEERL, EOMI, moist mucus membrane  Neck: supple, no JVD  Chest: Clear to auscultation bilaterally   CVS: S1 S2 heard, Capillary refill less than 2 seconds  Abd: soft/ non tender, non distended, BS physiological,   Ext: no clubbing, no cyanosis, no edema, brisk 2+ DP pulses  Neuro/Psych: pleasant mood and affect, moving all extremities spontaneously, no focal neurological deficit  Skin: warm     Data Review:    Review and/or order of clinical lab test  Review and/or order of tests in the radiology section of CPT  Review and/or order of tests in the medicine section of CPT  Discussion of test results with performing physician  I personally reviewed  Image and EKG/Monitor Tracing      I have personally and independently reviewed all pertinent labs,

## 2024-07-17 NOTE — PLAN OF CARE
Problem: Discharge Planning  Goal: Discharge to home or other facility with appropriate resources  7/17/2024 0157 by Ian Quiroga RN  Outcome: Progressing  7/16/2024 1218 by Chula Love RN  Outcome: Progressing     Problem: Safety - Adult  Goal: Free from fall injury  7/17/2024 0157 by Ian Quiroga RN  Outcome: Progressing  7/16/2024 1218 by Chula Love RN  Outcome: Progressing     Problem: Skin/Tissue Integrity  Goal: Absence of new skin breakdown  Description: 1.  Monitor for areas of redness and/or skin breakdown  2.  Assess vascular access sites hourly  3.  Every 4-6 hours minimum:  Change oxygen saturation probe site  4.  Every 4-6 hours:  If on nasal continuous positive airway pressure, respiratory therapy assess nares and determine need for appliance change or resting period.  7/17/2024 0157 by Ian Quiroga RN  Outcome: Progressing  7/16/2024 1218 by Chula Love RN  Outcome: Progressing     Problem: ABCDS Injury Assessment  Goal: Absence of physical injury  7/16/2024 1218 by Chula Love RN  Outcome: Progressing     Problem: Nutrition Deficit:  Goal: Optimize nutritional status  7/17/2024 0157 by Ian Quiroga RN  Outcome: Progressing  Flowsheets (Taken 7/16/2024 1543 by Trudi Roy, RD)  Nutrient intake appropriate for improving, restoring, or maintaining nutritional needs: Recommend appropriate diets, oral nutritional supplements, and vitamin/mineral supplements  7/16/2024 1218 by Chula Love RN  Outcome: Progressing     Problem: Chronic Conditions and Co-morbidities  Goal: Patient's chronic conditions and co-morbidity symptoms are monitored and maintained or improved  7/17/2024 0157 by Ian Quiroga RN  Outcome: Progressing  7/16/2024 1218 by Chula Love RN  Outcome: Progressing     Problem: Pain  Goal: Verbalizes/displays adequate comfort level or baseline comfort level  7/17/2024 0157 by Ian Quiroga RN  Outcome: Progressing  7/16/2024 1218 by

## 2024-07-17 NOTE — PLAN OF CARE
Problem: Discharge Planning  Goal: Discharge to home or other facility with appropriate resources  7/17/2024 0512 by Ian Quiroga RN  Outcome: Progressing  7/17/2024 0157 by Ian Quiroga RN  Outcome: Progressing     Problem: Safety - Adult  Goal: Free from fall injury  7/17/2024 0512 by Ian Quiroga RN  Outcome: Progressing  7/17/2024 0157 by Ian Quiroga RN  Outcome: Progressing     Problem: Skin/Tissue Integrity  Goal: Absence of new skin breakdown  Description: 1.  Monitor for areas of redness and/or skin breakdown  2.  Assess vascular access sites hourly  3.  Every 4-6 hours minimum:  Change oxygen saturation probe site  4.  Every 4-6 hours:  If on nasal continuous positive airway pressure, respiratory therapy assess nares and determine need for appliance change or resting period.  7/17/2024 0512 by Ian Quiroga RN  Outcome: Progressing  7/17/2024 0157 by Ian Quiroga RN  Outcome: Progressing     Problem: Nutrition Deficit:  Goal: Optimize nutritional status  Outcome: Progressing  Flowsheets (Taken 7/16/2024 1543 by Trudi Roy, RD)  Nutrient intake appropriate for improving, restoring, or maintaining nutritional needs: Recommend appropriate diets, oral nutritional supplements, and vitamin/mineral supplements

## 2024-07-17 NOTE — CONSULTS
[facility]  PROGRAM FOR DIABETES HEALTH  DIABETES MANAGEMENT CONSULT    Consulted by Provider for advanced nursing evaluation and care for inpatient blood glucose management.    Evaluation and Action Plan   Steve Martinez is a 53 y.o. male with IGLESIA/Type 1 diabetes, who was admitted after poor po intake (did not eat for 4 days) and month of urinary and now bowel incontinence. Patient's sister was in room at time of visit and helped with history. Patient was originally diagnosed with Type 2 diabetes \"a few years ago\", but has no family history of diabetes and only weighed 120 pounds at that time. 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 He is currently on disability, but was told Medicaid would not be active for 2 years with this? He has not been consistent about seeing endocrinologist (Vandana Reyna) and has only been taking his Lantus 11 units at night. He does not have a fast acting insulin for meal coverage (not sure what happened to prescription?) and does not regularly check his BG. He has lost about 30 pounds since he was originally diagnosed with diabetes, but sister notes most of it was just recently. He is cachectic and nutritionally deprived; dietician following. He has also had incontinence of both his bladder and bowel (more recent). The urinary incontinence could be related to hyperglycemia and UTI, but could also have underlying urology issue. I discussed the vital importance of BG checks at home in order to know how to treat. I can get him a Freestyle Nini 3 to go home with, but no insurance to cover thereafter. This will at least get him through the next 14 days to monitor and get on track. He was agreeable to this. He has no teeth, so tends to high carb, soft foods. We discussed the absolute need for fast acting insulin to cover meals. Will check with CM for financial assist. A1c pending, but previous A1c's have been  know more about how to stay healthy with diabetes    Overall evaluation:    [x] A1c > 14%    Subjective   ”That alarm kept me up all night.”     Objective   Physical exam  General Underweight male in no acute distress. Conversant and cooperative  Neuro  Alert, oriented   Vital Signs   Vitals:    07/17/24 0823   BP: 109/64   Pulse: 80   Resp: 18   Temp: 98.8 °F (37.1 °C)   SpO2: 97%       Diabetic foot exam: endorses neuropathy    Left Foot     Visual Exam:some old healed wounds   Pulse DP: Present  Right Foot   Visual Exam: some old healed wounds   Pulse DP: Present  Laboratory  Recent Labs     07/16/24  0332   WBC 8.9   HGB 9.7*   HCT 29.2*   MCV 93.3          Recent Labs     07/15/24  0201 07/16/24  0332    136   K 3.9 3.9    105   CO2 23 25   PHOS  --  2.1*   BUN 22* 21*   CREATININE 1.29 1.57*       Lab Results   Component Value Date    ALT 15 07/12/2024    AST 15 07/12/2024    ALKPHOS 95 07/12/2024    BILITOT 0.5 07/12/2024     Lab Results   Component Value Date    TSH 0.74 07/12/2024     Lab Results   Component Value Date    LABA1C >14.0 (H) 07/11/2024    LABA1C 13.9 (H) 07/11/2024    LABA1C 8.2 (H) 01/06/2023       Factors impacting BG management  Factor Dose Comments   Nutrition:  Standard meals     60 grams carbs/meal    Infection Ancef  UTI   Other:   Kidney function  Liver function   AMOS (resolved)  Normal       Blood glucose pattern    Significant diabetes-related events over the past 24-72 hours   this am; did not receive full dose of basal insulin yesterday    Assessment and Nursing Intervention   Nursing Diagnosis Risk for unstable blood glucose pattern   Nursing Intervention Domain 525 Decision-making Support   Nursing Interventions Examined current inpatient diabetes/blood glucose control   Explored factors facilitating and impeding inpatient management  Explored corrective strategies with patient and responsible inpatient provider   Informed patient of rational for

## 2024-07-17 NOTE — PLAN OF CARE
Problem: Occupational Therapy - Adult  Goal: By Discharge: Performs self-care activities at highest level of function for planned discharge setting.  See evaluation for individualized goals.  Description: FUNCTIONAL STATUS PRIOR TO ADMISSION:  Patient reports being MOD I at baseline, has SPC as needed and manages his ADLs independently. 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    HOME SUPPORT: Patient lived alone with sister locally (30-45 min away) to provide support.    Occupational Therapy Goals:  Initiated 7/12/2024  1.  Patient will perform grooming, standing at sink for 2 minutes, with Modified [place] within 7 day(s).  2.  Patient will perform lower body dressing with Set-up and Supervision within 7 day(s).  3.  Patient will perform bathing with Supervision within 7 day(s).  4.  Patient will perform toilet transfers with Modified [place]  within 7 day(s).  5.  Patient will perform all aspects of toileting with Modified [place] within 7 day(s).  6.  Patient will participate in upper extremity therapeutic exercise/activities with [place] for 10 minutes within 7 day(s).    Outcome: Progressing   OCCUPATIONAL THERAPY TREATMENT  Patient: Steve Martinez (54 y.o. male)  Date: 7/17/2024  Primary Diagnosis: Urinary retention [R33.9]  Hyponatremia [E87.1]  Metabolic acidosis [E87.20]  Septicemia (HCC) [A41.9]  Failure to thrive in adult [R62.7]  AMOS (acute kidney injury) (HCC) [N17.9]  Ketoacidosis, diabetic, type 2, no coma (HCC) [E11.10]  Diabetic ketoacidosis without coma associated with type 1 diabetes mellitus (HCC) [E10.10]       Precautions:  (falls)                Chart, occupational therapy assessment, plan of care, and goals were reviewed.    ASSESSMENT  Patient continues to benefit from skilled OT services and is progressing towards goals. Nursing cleared for therapy and received in bed,

## 2024-07-17 NOTE — PLAN OF CARE
Problem: Discharge Planning  Goal: Discharge to home or other facility with appropriate resources  7/17/2024 1052 by Chula Love RN  Outcome: Progressing  7/17/2024 0512 by Ian Quiroga RN  Outcome: Progressing  7/17/2024 0157 by Ian Quiroga RN  Outcome: Progressing     Problem: Safety - Adult  Goal: Free from fall injury  7/17/2024 1052 by Chula Love RN  Outcome: Progressing  7/17/2024 0512 by Ian Quiroga RN  Outcome: Progressing  7/17/2024 0157 by Ian Quiroga RN  Outcome: Progressing     Problem: Skin/Tissue Integrity  Goal: Absence of new skin breakdown  Description: 1.  Monitor for areas of redness and/or skin breakdown  2.  Assess vascular access sites hourly  3.  Every 4-6 hours minimum:  Change oxygen saturation probe site  4.  Every 4-6 hours:  If on nasal continuous positive airway pressure, respiratory therapy assess nares and determine need for appliance change or resting period.  7/17/2024 1052 by Chula Love RN  Outcome: Progressing  7/17/2024 0512 by Ian Quiroga RN  Outcome: Progressing  7/17/2024 0157 by Ian Quiroga RN  Outcome: Progressing     Problem: ABCDS Injury Assessment  Goal: Absence of physical injury  Outcome: Progressing     Problem: Nutrition Deficit:  Goal: Optimize nutritional status  7/17/2024 1052 by Chula Love RN  Outcome: Progressing  7/17/2024 0157 by Ian Quiroga RN  Outcome: Progressing  Flowsheets (Taken 7/16/2024 1543 by Trudi Roy, ARLETTE)  Nutrient intake appropriate for improving, restoring, or maintaining nutritional needs: Recommend appropriate diets, oral nutritional supplements, and vitamin/mineral supplements     Problem: Chronic Conditions and Co-morbidities  Goal: Patient's chronic conditions and co-morbidity symptoms are monitored and maintained or improved  7/17/2024 1052 by Chula Love RN  Outcome: Progressing  7/17/2024 0157 by Ian Quiroga RN  Outcome: Progressing     Problem: Pain  Goal:  Verbalizes/displays adequate comfort level or baseline comfort level  7/17/2024 1052 by Chula Love, RN  Outcome: Progressing  7/17/2024 0157 by Ian Quiroga, RN  Outcome: Progressing

## 2024-07-17 NOTE — CARE COORDINATION
7/17/2024 12:11 PM [facility] referral remains pending iv abx orders.  WILLEM Galindo     Care Management Progress Note     Admitting diagnosis:  Urinary retention [R33.9]  Hyponatremia [E87.1]  Metabolic acidosis [E87.20]  Septicemia (HCC) [A41.9]  Failure to thrive in adult [R62.7]  AMOS (acute kidney injury) (HCC) [N17.9]  Ketoacidosis, diabetic, type 2, no coma (HCC) [E11.10]  Diabetic ketoacidosis without coma associated with type 1 diabetes mellitus (HCC) [E10.10]     Admit Date:  7/11/2024  2:11 PM     Readmission: No  RUR:  10%  Risk Level: [x]Low []Moderate []High     Transition of care plan:  Ongoing medical management  ID and Cardiology consulted, following for iv abx orders   Pt will need picc placed prior to discharge   PT and OT consulted: Yes  Anticipated discharge plan: Transfer to [facility] for long term iv abx  Pt is uninsured, pt is over income for Medicaid, screened by [employer]   Outpatient follow-up.  Discharge transport: [facility] transport if accepted by [facility] for transfer

## 2024-07-18 LAB
ANION GAP SERPL CALC-SCNC: 5 MMOL/L (ref 5–15)
BUN SERPL-MCNC: 17 MG/DL (ref 6–20)
BUN/CREAT SERPL: 13 (ref 12–20)
CALCIUM SERPL-MCNC: 8.3 MG/DL (ref 8.5–10.1)
CHLORIDE SERPL-SCNC: 102 MMOL/L (ref 97–108)
CO2 SERPL-SCNC: 28 MMOL/L (ref 21–32)
CREAT SERPL-MCNC: 1.3 MG/DL (ref 0.7–1.3)
GLUCOSE BLD STRIP.AUTO-MCNC: 107 MG/DL (ref 65–117)
GLUCOSE BLD STRIP.AUTO-MCNC: 264 MG/DL (ref 65–117)
GLUCOSE BLD STRIP.AUTO-MCNC: 310 MG/DL (ref 65–117)
GLUCOSE BLD STRIP.AUTO-MCNC: 82 MG/DL (ref 65–117)
GLUCOSE SERPL-MCNC: 206 MG/DL (ref 65–100)
POTASSIUM SERPL-SCNC: 4.4 MMOL/L (ref 3.5–5.1)
SERVICE CMNT-IMP: ABNORMAL
SERVICE CMNT-IMP: ABNORMAL
SERVICE CMNT-IMP: NORMAL
SERVICE CMNT-IMP: NORMAL
SODIUM SERPL-SCNC: 135 MMOL/L (ref 136–145)

## 2024-07-18 PROCEDURE — 80048 BASIC METABOLIC PNL TOTAL CA: CPT

## 2024-07-18 PROCEDURE — 6370000000 HC RX 637 (ALT 250 FOR IP)

## 2024-07-18 PROCEDURE — 6370000000 HC RX 637 (ALT 250 FOR IP): Performed by: INTERNAL MEDICINE

## 2024-07-18 PROCEDURE — 6360000002 HC RX W HCPCS: Performed by: INTERNAL MEDICINE

## 2024-07-18 PROCEDURE — 2580000003 HC RX 258: Performed by: INTERNAL MEDICINE

## 2024-07-18 PROCEDURE — 6370000000 HC RX 637 (ALT 250 FOR IP): Performed by: STUDENT IN AN ORGANIZED HEALTH CARE EDUCATION/TRAINING PROGRAM

## 2024-07-18 PROCEDURE — 36415 COLL VENOUS BLD VENIPUNCTURE: CPT

## 2024-07-18 PROCEDURE — 1100000000 HC RM PRIVATE

## 2024-07-18 PROCEDURE — 99232 SBSQ HOSP IP/OBS MODERATE 35: CPT | Performed by: INTERNAL MEDICINE

## 2024-07-18 PROCEDURE — 94761 N-INVAS EAR/PLS OXIMETRY MLT: CPT

## 2024-07-18 PROCEDURE — 82962 GLUCOSE BLOOD TEST: CPT

## 2024-07-18 PROCEDURE — 99231 SBSQ HOSP IP/OBS SF/LOW 25: CPT

## 2024-07-18 RX ORDER — LOPERAMIDE HYDROCHLORIDE 2 MG/1
2 CAPSULE ORAL 3 TIMES DAILY
Status: DISCONTINUED | OUTPATIENT
Start: 2024-07-18 | End: 2024-07-19

## 2024-07-18 RX ORDER — LACTOBACILLUS RHAMNOSUS GG 10B CELL
1 CAPSULE ORAL
Status: DISCONTINUED | OUTPATIENT
Start: 2024-07-19 | End: 2024-07-20 | Stop reason: HOSPADM

## 2024-07-18 RX ORDER — INSULIN GLARGINE 100 [IU]/ML
16 INJECTION, SOLUTION SUBCUTANEOUS DAILY
Status: DISCONTINUED | OUTPATIENT
Start: 2024-07-18 | End: 2024-07-19

## 2024-07-18 RX ADMIN — INSULIN LISPRO 2 UNITS: 100 INJECTION, SOLUTION INTRAVENOUS; SUBCUTANEOUS at 09:14

## 2024-07-18 RX ADMIN — SODIUM CHLORIDE, PRESERVATIVE FREE 10 ML: 5 INJECTION INTRAVENOUS at 21:12

## 2024-07-18 RX ADMIN — GABAPENTIN 100 MG: 100 CAPSULE ORAL at 21:12

## 2024-07-18 RX ADMIN — CEFAZOLIN SODIUM 2000 MG: 1 POWDER, FOR SOLUTION INTRAMUSCULAR; INTRAVENOUS at 03:15

## 2024-07-18 RX ADMIN — CEFAZOLIN SODIUM 2000 MG: 1 POWDER, FOR SOLUTION INTRAMUSCULAR; INTRAVENOUS at 09:15

## 2024-07-18 RX ADMIN — CEFAZOLIN SODIUM 2000 MG: 1 POWDER, FOR SOLUTION INTRAMUSCULAR; INTRAVENOUS at 16:54

## 2024-07-18 RX ADMIN — CHOLESTYRAMINE 4 G: 4 POWDER, FOR SUSPENSION ORAL at 09:16

## 2024-07-18 RX ADMIN — MIDODRINE HYDROCHLORIDE 5 MG: 5 TABLET ORAL at 11:53

## 2024-07-18 RX ADMIN — SODIUM CHLORIDE, POTASSIUM CHLORIDE, SODIUM LACTATE AND CALCIUM CHLORIDE: 600; 310; 30; 20 INJECTION, SOLUTION INTRAVENOUS at 03:23

## 2024-07-18 RX ADMIN — INSULIN LISPRO 6 UNITS: 100 INJECTION, SOLUTION INTRAVENOUS; SUBCUTANEOUS at 16:47

## 2024-07-18 RX ADMIN — SODIUM CHLORIDE, PRESERVATIVE FREE 10 ML: 5 INJECTION INTRAVENOUS at 09:25

## 2024-07-18 RX ADMIN — LOPERAMIDE HYDROCHLORIDE 2 MG: 2 CAPSULE ORAL at 11:57

## 2024-07-18 RX ADMIN — MIDODRINE HYDROCHLORIDE 5 MG: 5 TABLET ORAL at 09:15

## 2024-07-18 RX ADMIN — GABAPENTIN 100 MG: 100 CAPSULE ORAL at 14:05

## 2024-07-18 RX ADMIN — HEPARIN SODIUM 5000 UNITS: 5000 INJECTION INTRAVENOUS; SUBCUTANEOUS at 09:15

## 2024-07-18 RX ADMIN — SODIUM CHLORIDE, PRESERVATIVE FREE 10 ML: 5 INJECTION INTRAVENOUS at 09:24

## 2024-07-18 RX ADMIN — HEPARIN SODIUM 5000 UNITS: 5000 INJECTION INTRAVENOUS; SUBCUTANEOUS at 21:12

## 2024-07-18 RX ADMIN — LOPERAMIDE HYDROCHLORIDE 2 MG: 2 CAPSULE ORAL at 14:05

## 2024-07-18 RX ADMIN — INSULIN GLARGINE 16 UNITS: 100 INJECTION, SOLUTION SUBCUTANEOUS at 09:14

## 2024-07-18 RX ADMIN — MIDODRINE HYDROCHLORIDE 5 MG: 5 TABLET ORAL at 16:54

## 2024-07-18 RX ADMIN — INSULIN LISPRO 3 UNITS: 100 INJECTION, SOLUTION INTRAVENOUS; SUBCUTANEOUS at 11:53

## 2024-07-18 RX ADMIN — GABAPENTIN 100 MG: 100 CAPSULE ORAL at 09:16

## 2024-07-18 RX ADMIN — INSULIN LISPRO 6 UNITS: 100 INJECTION, SOLUTION INTRAVENOUS; SUBCUTANEOUS at 09:15

## 2024-07-18 RX ADMIN — INSULIN LISPRO 6 UNITS: 100 INJECTION, SOLUTION INTRAVENOUS; SUBCUTANEOUS at 11:54

## 2024-07-18 NOTE — CONSULTS
[facility]  [facility]  DIABETES MANAGEMENT CONSULT    Consulted by Provider for advanced nursing evaluation and care for inpatient blood glucose management.    Evaluation and Action Plan   Steve Martinez is a 53 y.o. male with IGLESIA/Type 1 diabetes, who was admitted after poor po intake (did not eat for 4 days) and month of urinary and now bowel incontinence. Patient's sister was in room at time of visit and helped with history. Patient was originally diagnosed with Type 2 diabetes \"a few years ago\", but has no family history of diabetes and only weighed 120 pounds at that time. 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 He is currently on disability, but was told Medicaid would not be active for 2 years with this? He has not been consistent about seeing endocrinologist (Vandana Reyna) and has only been taking his Lantus 11 units at night. He does not have a fast acting insulin for meal coverage (not sure what happened to prescription?) and does not regularly check his BG. He has lost about 30 pounds since he was originally diagnosed with diabetes, but sister notes most of it was just recently. He is cachectic and nutritionally deprived; dietician following. He has also had incontinence of both his bladder and bowel (more recent). The urinary incontinence could be related to hyperglycemia and UTI, but could also have underlying urology issue. I discussed the vital importance of BG checks at home in order to know how to treat. I can get him a Freestyle Nini 3 to go home with, but no insurance to cover thereafter. This will at least get him through the next 14 days to monitor and get on track. He was agreeable to this. He has no teeth, so tends to high carb, soft foods. We discussed the absolute need for fast acting insulin to cover meals. Will check with CM for financial assist. A1c pending, but previous A1c's have been  ([facility]) [A41.9]  Failure to thrive in adult [R62.7]  AMOS (acute kidney injury) ([facility]) [N17.9]  Ketoacidosis, diabetic, type 2, no coma ([facility]) [E11.10]  Diabetic ketoacidosis without coma associated with type 1 diabetes mellitus ([facility]) [E10.10]     Current Treatment     TX: IVF, Neurontin, midodrine, heparin, insulin, antibiotics    Hospital Course   7/11 admitted with failure to thrive, leukocytosis, DKA, hyponatremia, AMOS, sepsis  7/15 waiting on cultures and VÍCTOR  Diabetes History   Orginally diagnosed with Type 2 diabetes; later discovered to be IGLESIA (Type 1)    Diabetes-related Medical History  Acute complications  DKA  Neurological complications  Peripheral neuropathy  Microvascular disease  AMOS    Other associated conditions     Failure to thrive    Diabetes Medication History  Diabetes drug class Diabetes drug name Additional Comments   Insulin Lantus 10 units nightly  Aspart 4 units with meals      Diabetes self-management practices:   Eating pattern   [x] Not always eating a carbohydrate-controlled meal plan  [x] Breakfast   Some eggs, potatoes  [x] Lunch   Pot pie   [x] Dinner   Similar to lunch; frozen dinner  [x] Snacks   Chips   [x] Beverages  Water; diet soda  [x] Dentition status Missing teeth  Physical activity pattern   [x] Not employing a physical activity program to control BG  Monitoring pattern   [x] Not testing BGs sufficiently to inform self-management adjustments  His glucometer was not working (per sister), so they recently bought a new one. Has never been on a CGM due to no insurance  Taking medications pattern  [x] Consistent administration with his Lantus. Was not taking any fast acting insulin  [] Affordable?  Reducing risks  [] Influenza:      [] Pneumonia:     [] Hepatitis:    Social determinants of health impacting diabetes self-management practices    Worried that housing situation is unstable, Struggling with anxiety and/or depression, and Concerned that you need to know more about how to

## 2024-07-18 NOTE — PROGRESS NOTES
Nutrition Note    Recommendations:   GI Consult (stool study, malabsorption, diarrhea prevention, evaluation of pancreatic function)  Consider Digestive Enzyme needs    Small Frequent meals, Eat slowly and Chew foods well  Recheck & if still low, replace Phosphorus  Continue ONS & diet as ordered    Brief Follow Up:   RD reached out to Hospitalist MD regarding diarrhea and possible malabsorption concerns and awaiting response back. New standing weight obtained indicates some increase in weight since admission. Provided additional Nutrition related Diabetes education and booklet for reference. Patient and sister are receptive and engaged in learning. Patient voices frustration with frequent bowel movements still and was in the bathroom when RD arrived for assessment today. He mentions \"foods often come out looking the same as they went in\". Will continue to provide education and monitor I/Os.     Diet Order:   ADULT ORAL NUTRITION SUPPLEMENT; Lunch, Breakfast, Dinner; Diabetic Oral Supplement  ADULT DIET; Easy to Chew; 4 carb choices (60 gm/meal); No Concentrated sweets; Bread OK per RD; PLEASE DO NOT SEND JUICE ON HIS TRAY    Meal Intake:   Patient Vitals for the past 168 hrs:   PO Meals Eaten (%)   07/17/24 2003 51 - 75%   07/13/24 1000 51 - 75%     Supplement Intake:  No data found.    Last BM: 07/18/24  Edema: None                  Last Weight Metrics:      7/18/2024     8:54 AM 7/16/2024     3:43 PM 7/16/2024    11:33 AM 7/15/2024     4:34 AM 7/12/2024     1:58 PM 7/12/2024     7:57 AM 7/11/2024     2:41 PM   Weight Loss Metrics   Height  5' 2\" 5' 2\"  5' 2\" 5' 2\"    Weight - Scale 101 lbs 6 oz  99 lbs 99 lbs 90 lbs  90 lbs 13 oz   BMI (Calculated) 18.6 kg/m2  18.1 kg/m2 18.1 kg/m2 16.5 kg/m2  0 kg/m2     Nutr. Labs:  Lab Results   Component Value Date    CREATININE 1.30 07/18/2024    BUN 17 07/18/2024     (L) 07/18/2024    K 4.4 07/18/2024     07/18/2024    CO2 28 07/18/2024     Lab Results

## 2024-07-18 NOTE — PLAN OF CARE
Problem: Discharge Planning  Goal: Discharge to home or other facility with appropriate resources  7/18/2024 0946 by Harlan Keenan, RN  Outcome: Progressing  7/18/2024 0740 by Ian Quiroga, RN  Outcome: Progressing     Problem: Safety - Adult  Goal: Free from fall injury  Outcome: Progressing     Problem: Skin/Tissue Integrity  Goal: Absence of new skin breakdown  Description: 1.  Monitor for areas of redness and/or skin breakdown  2.  Assess vascular access sites hourly  3.  Every 4-6 hours minimum:  Change oxygen saturation probe site  4.  Every 4-6 hours:  If on nasal continuous positive airway pressure, respiratory therapy assess nares and determine need for appliance change or resting period.  Outcome: Progressing     Problem: ABCDS Injury Assessment  Goal: Absence of physical injury  Outcome: Progressing     Problem: Nutrition Deficit:  Goal: Optimize nutritional status  Outcome: Progressing     Problem: Chronic Conditions and Co-morbidities  Goal: Patient's chronic conditions and co-morbidity symptoms are monitored and maintained or improved  Outcome: Progressing     Problem: Pain  Goal: Verbalizes/displays adequate comfort level or baseline comfort level  Outcome: Progressing

## 2024-07-18 NOTE — CARE COORDINATION
Transition of Care Plan:    RUR: 9% Low  Prior Level of Functioning: Lived independently  Disposition: Transfer to [facility] for long term IV ABX-EMTALA   Follow up appointments: NA  DME needed: NA  Transportation at discharge: BLS  IM/IMM Medicare/ letter given: NA  Caregiver Contact: Melyssa Birch [phone]  Discharge Caregiver contacted prior to discharge? NA  Care Conference needed? No  Barriers to discharge: NA    CM continuing to follow for transitional care planning needs. Patient is planned for [facility] transfer tomorrow pending final ID recommendations, picc line placement, and bed availability. CM updated [facility] staff via email. Attending has been in communication with accepting MD at [facility] as well. CM to monitor.     Lissa Padron, MS  [phone]

## 2024-07-18 NOTE — PROGRESS NOTES
VAT note- To acknowledge order for PICC placement for long term antibiotics.  Chart reviewed for PICC placement evaluation.  Will plan to place PICC prior to discharge.  Likely tomorrow.

## 2024-07-18 NOTE — PROGRESS NOTES
[facility] Progress Note  Omar Seals   [phone] Office  [phone] Fax    2024      Assessment & Plan:     MSSA bacteremia.   Suspect due to head wound.  VÍCTOR negative for vegetation nafcillin.   Repeat blood cultures negative at 2 days.  As long as they remain sterile tomorrow can discharge with IV antibiotic plans as outlined below.     Bilateral hydroureteronephrosis with possible left sided pyelonephritis.  MSSA growing in urine culture.  This would be an unusual cause of UTI and may represent spillover from blood into the urine rather than source of infection.  Urology following  AMOS.  Due to above.  Resolved  DKA.  Management per primary team  Poorly controlled diabetes mellitus.  Hemoglobin A1c greater than 14    Post Discharge PICC and Antibiotic Orders    1.  Diagnosis: MSSA bacteremia  2.  Antibiotic: Cefazolin 2 g IV every 8 hours through 2024  3.  Routine PICC/ Luca/ Portacath Care including PRN catheter flow management  4.  Weekly labs:   [x] CBC/diff/platelets   [x] BUN/Creatinine   [] CPK   [x] AST/TotalBilirubin/AlkalinePhosphatase   [] CRP   []Trough Vancomycin level goal 15-20  5.  Fax lab to [phone]  Call Critical Lab Values to [phone]  6.  May send to IR for line evaluation or replacement [phone]  7.  Home Health to pull PIC line at end of therapy or send to IR for Luca removal  8.  Allergies:  No Known Allergies      Omar Seals DO             Subjective:     No new complaints    Objective:     Vitals: /73   Pulse 80   Temp 98.1 °F (36.7 °C) (Oral)   Resp 16   Ht 1.575 m (5' 2\")   Wt 46 kg (101 lb 6.4 oz)   SpO2 98%   BMI 18.55 kg/m²      Tmax:  Temp (24hrs), Av.2 °F (36.8 °C), Min:97.7 °F (36.5 °C), Max:98.6 °F (37 °C)      Exam:   Patient is intubated:  no    Physical Examination:   General:  Alert, cooperative, no distress   Head:  Normocephalic, atraumatic.   Eyes:  Conjunctivae clear   Neck:

## 2024-07-18 NOTE — PROGRESS NOTES
(NEURONTIN) capsule 100 mg  100 mg Oral TID    lactated ringers IV soln infusion   IntraVENous Continuous    insulin lispro (HUMALOG,ADMELOG) injection vial 0-4 Units  0-4 Units SubCUTAneous TID WC    insulin lispro (HUMALOG,ADMELOG) injection vial 0-4 Units  0-4 Units SubCUTAneous Nightly    polyethylene glycol (GLYCOLAX) packet 17 g  17 g Oral Daily    ceFAZolin (ANCEF) 2,000 mg in sterile water 20 mL IV syringe  2,000 mg IntraVENous Q8H    midodrine (PROAMATINE) tablet 5 mg  5 mg Oral TID WC    glucose chewable tablet 16 g  4 tablet Oral PRN    dextrose bolus 10% 125 mL  125 mL IntraVENous PRN    Or    dextrose bolus 10% 250 mL  250 mL IntraVENous PRN    glucagon injection 1 mg  1 mg SubCUTAneous PRN    sodium chloride flush 0.9 % injection 5-40 mL  5-40 mL IntraVENous 2 times per day    sodium chloride flush 0.9 % injection 5-40 mL  5-40 mL IntraVENous PRN    heparin (porcine) injection 5,000 Units  5,000 Units SubCUTAneous BID    ondansetron (ZOFRAN-ODT) disintegrating tablet 4 mg  4 mg Oral Q8H PRN    Or    ondansetron (ZOFRAN) injection 4 mg  4 mg IntraVENous Q6H PRN    acetaminophen (TYLENOL) tablet 650 mg  650 mg Oral Q6H PRN    Or    acetaminophen (TYLENOL) suppository 650 mg  650 mg Rectal Q6H PRN    sodium chloride flush 0.9 % injection 5-40 mL  5-40 mL IntraVENous 2 times per day    sodium chloride flush 0.9 % injection 5-40 mL  5-40 mL IntraVENous PRN    ALPRAZolam (XANAX) tablet 0.25 mg  0.25 mg Oral 4x Daily PRN    HYDROcodone-acetaminophen (NORCO) 5-325 MG per tablet 1 tablet  1 tablet Oral Q6H PRN     ______________________________________________________________________  EXPECTED LENGTH OF STAY: 8  ACTUAL LENGTH OF STAY:          7                 Chaim Fontaine MD

## 2024-07-19 ENCOUNTER — APPOINTMENT (OUTPATIENT)
Facility: HOSPITAL | Age: 54
DRG: 871 | End: 2024-07-19
Attending: STUDENT IN AN ORGANIZED HEALTH CARE EDUCATION/TRAINING PROGRAM

## 2024-07-19 LAB
GLUCOSE BLD STRIP.AUTO-MCNC: 133 MG/DL (ref 65–117)
GLUCOSE BLD STRIP.AUTO-MCNC: 145 MG/DL (ref 65–117)
GLUCOSE BLD STRIP.AUTO-MCNC: 256 MG/DL (ref 65–117)
GLUCOSE BLD STRIP.AUTO-MCNC: 269 MG/DL (ref 65–117)
SERVICE CMNT-IMP: ABNORMAL

## 2024-07-19 PROCEDURE — 6370000000 HC RX 637 (ALT 250 FOR IP)

## 2024-07-19 PROCEDURE — 99231 SBSQ HOSP IP/OBS SF/LOW 25: CPT

## 2024-07-19 PROCEDURE — 2580000003 HC RX 258: Performed by: INTERNAL MEDICINE

## 2024-07-19 PROCEDURE — 6370000000 HC RX 637 (ALT 250 FOR IP): Performed by: STUDENT IN AN ORGANIZED HEALTH CARE EDUCATION/TRAINING PROGRAM

## 2024-07-19 PROCEDURE — 2580000003 HC RX 258: Performed by: STUDENT IN AN ORGANIZED HEALTH CARE EDUCATION/TRAINING PROGRAM

## 2024-07-19 PROCEDURE — 1100000000 HC RM PRIVATE

## 2024-07-19 PROCEDURE — 6370000000 HC RX 637 (ALT 250 FOR IP): Performed by: INTERNAL MEDICINE

## 2024-07-19 PROCEDURE — 82962 GLUCOSE BLOOD TEST: CPT

## 2024-07-19 PROCEDURE — 97530 THERAPEUTIC ACTIVITIES: CPT

## 2024-07-19 PROCEDURE — 6360000002 HC RX W HCPCS: Performed by: INTERNAL MEDICINE

## 2024-07-19 PROCEDURE — C1751 CATH, INF, PER/CENT/MIDLINE: HCPCS

## 2024-07-19 PROCEDURE — B548ZZA ULTRASONOGRAPHY OF SUPERIOR VENA CAVA, GUIDANCE: ICD-10-PCS | Performed by: STUDENT IN AN ORGANIZED HEALTH CARE EDUCATION/TRAINING PROGRAM

## 2024-07-19 PROCEDURE — 02HV33Z INSERTION OF INFUSION DEVICE INTO SUPERIOR VENA CAVA, PERCUTANEOUS APPROACH: ICD-10-PCS | Performed by: STUDENT IN AN ORGANIZED HEALTH CARE EDUCATION/TRAINING PROGRAM

## 2024-07-19 RX ORDER — LOPERAMIDE HYDROCHLORIDE 2 MG/1
2 CAPSULE ORAL DAILY
Status: DISCONTINUED | OUTPATIENT
Start: 2024-07-20 | End: 2024-07-20 | Stop reason: HOSPADM

## 2024-07-19 RX ORDER — INSULIN LISPRO 100 [IU]/ML
4 INJECTION, SOLUTION INTRAVENOUS; SUBCUTANEOUS
Status: DISCONTINUED | OUTPATIENT
Start: 2024-07-19 | End: 2024-07-20 | Stop reason: HOSPADM

## 2024-07-19 RX ORDER — INSULIN GLARGINE 100 [IU]/ML
20 INJECTION, SOLUTION SUBCUTANEOUS DAILY
Status: DISCONTINUED | OUTPATIENT
Start: 2024-07-19 | End: 2024-07-19

## 2024-07-19 RX ORDER — SODIUM CHLORIDE, SODIUM LACTATE, POTASSIUM CHLORIDE, CALCIUM CHLORIDE 600; 310; 30; 20 MG/100ML; MG/100ML; MG/100ML; MG/100ML
INJECTION, SOLUTION INTRAVENOUS CONTINUOUS
Status: DISCONTINUED | OUTPATIENT
Start: 2024-07-19 | End: 2024-07-20 | Stop reason: HOSPADM

## 2024-07-19 RX ORDER — INSULIN GLARGINE 100 [IU]/ML
18 INJECTION, SOLUTION SUBCUTANEOUS EVERY EVENING
Status: DISCONTINUED | OUTPATIENT
Start: 2024-07-20 | End: 2024-07-20 | Stop reason: HOSPADM

## 2024-07-19 RX ORDER — INSULIN LISPRO 100 [IU]/ML
6 INJECTION, SOLUTION INTRAVENOUS; SUBCUTANEOUS
Status: DISCONTINUED | OUTPATIENT
Start: 2024-07-20 | End: 2024-07-20 | Stop reason: HOSPADM

## 2024-07-19 RX ORDER — INSULIN GLARGINE 100 [IU]/ML
18 INJECTION, SOLUTION SUBCUTANEOUS DAILY
Status: DISCONTINUED | OUTPATIENT
Start: 2024-07-19 | End: 2024-07-19

## 2024-07-19 RX ADMIN — HEPARIN SODIUM 5000 UNITS: 5000 INJECTION INTRAVENOUS; SUBCUTANEOUS at 21:37

## 2024-07-19 RX ADMIN — CEFAZOLIN SODIUM 2000 MG: 1 POWDER, FOR SOLUTION INTRAMUSCULAR; INTRAVENOUS at 08:41

## 2024-07-19 RX ADMIN — GABAPENTIN 100 MG: 100 CAPSULE ORAL at 08:40

## 2024-07-19 RX ADMIN — SODIUM CHLORIDE, PRESERVATIVE FREE 10 ML: 5 INJECTION INTRAVENOUS at 21:38

## 2024-07-19 RX ADMIN — INSULIN LISPRO 2 UNITS: 100 INJECTION, SOLUTION INTRAVENOUS; SUBCUTANEOUS at 08:41

## 2024-07-19 RX ADMIN — SODIUM CHLORIDE, POTASSIUM CHLORIDE, SODIUM LACTATE AND CALCIUM CHLORIDE: 600; 310; 30; 20 INJECTION, SOLUTION INTRAVENOUS at 18:30

## 2024-07-19 RX ADMIN — INSULIN GLARGINE 20 UNITS: 100 INJECTION, SOLUTION SUBCUTANEOUS at 11:42

## 2024-07-19 RX ADMIN — SODIUM CHLORIDE, POTASSIUM CHLORIDE, SODIUM LACTATE AND CALCIUM CHLORIDE: 600; 310; 30; 20 INJECTION, SOLUTION INTRAVENOUS at 08:54

## 2024-07-19 RX ADMIN — HEPARIN SODIUM 5000 UNITS: 5000 INJECTION INTRAVENOUS; SUBCUTANEOUS at 08:42

## 2024-07-19 RX ADMIN — CEFAZOLIN SODIUM 2000 MG: 1 POWDER, FOR SOLUTION INTRAMUSCULAR; INTRAVENOUS at 02:00

## 2024-07-19 RX ADMIN — GABAPENTIN 100 MG: 100 CAPSULE ORAL at 15:19

## 2024-07-19 RX ADMIN — GABAPENTIN 100 MG: 100 CAPSULE ORAL at 21:37

## 2024-07-19 RX ADMIN — Medication 1 CAPSULE: at 08:40

## 2024-07-19 RX ADMIN — INSULIN LISPRO 6 UNITS: 100 INJECTION, SOLUTION INTRAVENOUS; SUBCUTANEOUS at 08:41

## 2024-07-19 RX ADMIN — INSULIN LISPRO 2 UNITS: 100 INJECTION, SOLUTION INTRAVENOUS; SUBCUTANEOUS at 11:41

## 2024-07-19 RX ADMIN — INSULIN LISPRO 6 UNITS: 100 INJECTION, SOLUTION INTRAVENOUS; SUBCUTANEOUS at 11:50

## 2024-07-19 RX ADMIN — MIDODRINE HYDROCHLORIDE 5 MG: 5 TABLET ORAL at 08:40

## 2024-07-19 RX ADMIN — INSULIN LISPRO 4 UNITS: 100 INJECTION, SOLUTION INTRAVENOUS; SUBCUTANEOUS at 17:47

## 2024-07-19 RX ADMIN — SODIUM CHLORIDE, POTASSIUM CHLORIDE, SODIUM LACTATE AND CALCIUM CHLORIDE: 600; 310; 30; 20 INJECTION, SOLUTION INTRAVENOUS at 11:44

## 2024-07-19 RX ADMIN — CEFAZOLIN SODIUM 2000 MG: 1 POWDER, FOR SOLUTION INTRAMUSCULAR; INTRAVENOUS at 17:47

## 2024-07-19 RX ADMIN — SODIUM CHLORIDE, PRESERVATIVE FREE 10 ML: 5 INJECTION INTRAVENOUS at 08:42

## 2024-07-19 NOTE — CARE COORDINATION
Transition of Care Plan:      RUR: 8% Low  Prior Level of Functioning: Lived independently  Disposition: Transfer to [facility] for long term IV ABX-EMTALA   Follow up appointments: NA  DME needed: NA  Transportation at discharge: BLS  IM/IMM Medicare/ letter given: CINDY  Caregiver Contact: Melyssa Birch [phone]  Discharge Caregiver contacted prior to discharge? NA  Care Conference needed? No  Barriers to discharge: [facility] bed availability    CM received update from attending that the patient is cleared for transfer to [facility]. Urology has cleared patient, ID has placed recommendations, and picc line has been ordered. CM contacted MAGDALENA Mcfarland at [facility] [phone] to provide update. Bruna will reach out to CM regarding bed availability. CM to monitor.     9:44 CM received call back from Bruna at [facility]. They do not have a bed today and will unlikely have a bed over the weekend but weekend CM coverage can contact [facility] at [phone] for any updates. Patient and family provided update. CM to monitor.    Lissa Padron, MS  [phone]

## 2024-07-19 NOTE — CONSULTS
pyelonephritis.    HX:   Past Medical History:   Diagnosis Date    History of diabetes insipidus     History of vascular access device 07/19/2024    [facility] VAT placed 4 fr single PICC, RT basilic 37 cm 0 cm out, arm cir 22 cm    History of weight loss     Neuropathy     Uncontrolled type 2 diabetes mellitus with hyperglycemia (HCC) 03/03/2021        INITIAL DX: Urinary retention [R33.9]  Hyponatremia [E87.1]  Metabolic acidosis [E87.20]  Septicemia (HCC) [A41.9]  Failure to thrive in adult [R62.7]  AMOS (acute kidney injury) (HCC) [N17.9]  Ketoacidosis, diabetic, type 2, no coma (HCC) [E11.10]  Diabetic ketoacidosis without coma associated with type 1 diabetes mellitus (HCC) [E10.10]     Current Treatment     TX: IVF, Neurontin, midodrine, heparin, insulin, antibiotics    Hospital Course   7/11 admitted with failure to thrive, leukocytosis, DKA, hyponatremia, AMOS, sepsis  7/15 waiting on cultures and VÍCTOR  Diabetes History   Orginally diagnosed with Type 2 diabetes; later discovered to be IGLESIA (Type 1)    Diabetes-related Medical History  Acute complications  DKA  Neurological complications  Peripheral neuropathy  Microvascular disease  AMOS    Other associated conditions     Failure to thrive    Diabetes Medication History  Diabetes drug class Diabetes drug name Additional Comments   Insulin Lantus 10 units nightly  Aspart 4 units with meals      Diabetes self-management practices:   Eating pattern   [x] Not always eating a carbohydrate-controlled meal plan  [x] Breakfast   Some eggs, potatoes  [x] Lunch   Pot pie   [x] Dinner   Similar to lunch; frozen dinner  [x] Snacks   Chips   [x] Beverages  Water; diet soda  [x] Dentition status Missing teeth  Physical activity pattern   [x] Not employing a physical activity program to control BG  Monitoring pattern   [x] Not testing BGs sufficiently to inform self-management adjustments  His glucometer was not working (per sister), so they recently bought a new one. Has never  Intervention   Nursing Diagnosis Risk for unstable blood glucose pattern   Nursing Intervention Domain 5250 Decision-making Support   Nursing Interventions Examined current inpatient diabetes/blood glucose control   Explored factors facilitating and impeding inpatient management  Explored corrective strategies with patient and responsible inpatient provider   Informed patient of rational for insulin strategy while hospitalized     Nursing Diagnosis 14232 Ineffective Health Management   Nursing Intervention Domain 5256 Decision-making Support   Nursing Interventions Identified diabetes self-management practices impeding diabetes control  Discussed diabetes survival skills related to  Healthy Plate eating plan; given handouts  Role of physical activity in improving insulin sensitivity and action  Procedure for blood glucose monitoring & options for low-cost products  Medications plan at discharge     Billing Code(s)   47995     Before making these care recommendations, I personally reviewed the hospitalization record, including notes, laboratory & diagnostic data and current medications, and examined the patient at the bedside.  Total minutes: 25    ZAY JEFFERS - CNS  Diabetes Clinical Nurse Specialist  [facility]  Access via [facility]

## 2024-07-19 NOTE — PROGRESS NOTES
PICC Placement Note    PRE-PROCEDURE VERIFICATION  Correct Procedure: yes  Correct Site:  yes  Temperature: Temp: 98.4 °F (36.9 °C)   Recent Labs     07/18/24  0311   BUN 17     Allergies: Patient has no known allergies.  Education materials for PICC Care given: yes. See Patient Education activity for further details.  PICC Booklet placed at bedside: yes    Closed Ended PICC Catheters:  Flush Lumens as Follows:  Intermittent Medication:   Flush before and after each medication with 10 ml NS.   Unused Ports:  Flush every 8 hours with 10 ml NS.  TPN Ports:  Flush every 24 hours with 20 ml NS prior to hanging new bag.  Blood Draws: Stop infusion, draw off and waste 10 ml of blood. Draw sample with 10cc syringe or greater. DO NOT USE VACUTAINER . Transfer with appropriate device to lab  tubes. Flush with 20 ml NS.  Dressing Change:  Every 7 days, and PRN using sterile technique if integrity of dressing is compromised.  Initial dressing change for central line 24-48 hours post insertion if gauze is used. Apply new dressing per policy.    PROCEDURE DETAIL  Consent was obtained and all questions were answered related to risks and benefits.   A single lumen PICC line was inserted, as a sterile procedure using ultrasound and modified Seldinger technique for antibiotic therapy. The following documentation is in addition to the PICC properties in the lines/airways flowsheet :  Lot #: [id number]  Lidocaine 1% administered intradermally :yes  Internal Catheter Total Length: 37 (cm) 0 cm out, Arm cir 22 cm   Vein Selection for PICC:right basilic  Central Line Bundle followed no  Complication Related to Insertion:no    The placement was verified by EKG, MAX P WAVE @ 37 (cm). PER EKG PICC TIP @ C/A junction.         Line is okay to use: yes    Richa Good, RN

## 2024-07-19 NOTE — PLAN OF CARE
Problem: Occupational Therapy - Adult  Goal: By Discharge: Performs self-care activities at highest level of function for planned discharge setting.  See evaluation for individualized goals.  Description: FUNCTIONAL STATUS PRIOR TO ADMISSION:  Patient reports being MOD I at baseline, has SPC as needed and manages his ADLs independently. 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    HOME SUPPORT: Patient lived alone with sister locally (30-45 min away) to provide support.    Occupational Therapy Goals:  Initiated 7/12/2024, continued 7/19/2024  1.  Patient will perform grooming, standing at sink for 2 minutes, with Modified Shafer within 7 day(s).  2.  Patient will perform lower body dressing with Set-up and Supervision within 7 day(s).  3.  Patient will perform bathing with Supervision within 7 day(s).  4.  Patient will perform toilet transfers with Modified Shafer  within 7 day(s).  5.  Patient will perform all aspects of toileting with Modified Shafer within 7 day(s).  6.  Patient will participate in upper extremity therapeutic exercise/activities with Shafer for 10 minutes within 7 day(s).    Outcome: Progressing     OCCUPATIONAL THERAPY TREATMENT: WEEKLY REASSESSMENT  Patient: Steve Martinez (54 y.o. male)  Date: 7/19/2024  Primary Diagnosis: Urinary retention [R33.9]  Hyponatremia [E87.1]  Metabolic acidosis [E87.20]  Septicemia (HCC) [A41.9]  Failure to thrive in adult [R62.7]  AMOS (acute kidney injury) (HCC) [N17.9]  Ketoacidosis, diabetic, type 2, no coma (HCC) [E11.10]  Diabetic ketoacidosis without coma associated with type 1 diabetes mellitus (HCC) [E10.10]       Precautions:  (falls)                Chart, occupational therapy assessment, plan of care, and goals were reviewed.    ASSESSMENT  Patient is progressing well in OT goals (continued for weekly reassessment).  He reports improved  overall strength and endurance with improved nutrition.  He has WFL AROM/ strength/ coordination for ADLs.  He remains limited by mild generalized weakness and mildly impaired balance.  Today he was very motivated to ambulate to outside patio (with nursing clearance).  He required only SBA using RW to ambulate ~200 feet to patio, took a seated rest break outside, and ambulated another ~200 feet back to room with no loss of balance and no standing rest breaks.  Functionally, patient could return home with HHOT.  Note he is anticipating transfer to [facility] for several weeks of IV abx.         PLAN :  Patient continues to benefit from skilled intervention to address the above impairments.  Continue treatment per established plan of care to address goals.    Recommendation for discharge: (in order for the patient to meet his/her long term goals): Functionally, patient could return home with HHOT.  Note he is anticipating transfer to [facility] for several weeks of IV abx.         SUBJECTIVE:   Patient stated “It feels so good to be out in the sunshine.”    OBJECTIVE DATA SUMMARY:     Functional Mobility and Transfers for ADLs:    Transfers:   Transfer Training  Transfer Training: Yes  Sit to Stand: Stand-by assistance  Stand to Sit: Stand-by assistance           Balance:     Balance  Sitting: Intact  Standing: Impaired  Standing - Static: Good  Standing - Dynamic: Good      Pain Rating:  Patient did not report pain      Activity Tolerance:   Good    After treatment:   Patient left in no apparent distress sitting up in chair, Call bell within reach, Bed/ chair alarm activated, and Caregiver / family present    COMMUNICATION/EDUCATION:   The patient's plan of care was discussed with: physical therapist and registered nurse         Thank you for this referral.  Douglas Hernandez OT  Minutes: 40

## 2024-07-19 NOTE — PROGRESS NOTES
Spiritual Care Assessment/Progress Note  Formerly named [facility]    Name: Steve Martinez MRN: [id number]    Age: 54 y.o.     Sex: male   Language: English     Date: 7/19/2024            Total Time Calculated: 30 min              Spiritual Assessment begun in [facility] A3 MULTI-SPECIALTY TELEMETRY  Service Provided For: Patient, Family  Referral/Consult From: Rounding  Encounter Overview/Reason: Initial Encounter    Spiritual beliefs:      [x] Involved in a doreen tradition/spiritual practice: Catholic     [] Supported by a doreen community:      [] Claims no spiritual orientation:      [] Seeking spiritual identity:           [] Adheres to an individual form of spirituality:      [] Not able to assess:                Identified resources for coping and support system:   Support System: Family members (Sister named Melyssa)       [x] Prayer                  [] Devotional reading               [] Music                  [] Guided Imagery     [] Pet visits                                        [] Other: (COMMENT)     Specific area/focus of visit   Encounter:    Crisis:    Spiritual/Emotional needs: Type: Spiritual Support  Ritual, Rites and Sacraments:    Grief, Loss, and Adjustments: Type: New Diagnosis  Ethics/Mediation:    Behavioral Health:    Palliative Care:    Advance Care Planning:      Plan/Referrals: Other (Comment) (Contact spiritual health services for further assistance needed.)    Narrative: Chart review. I rounded on Med Surg 3. Patient was present in the room with his sister named Melyssa. Patient is Catholic but is not Buddhist. Patient shared his new medical diagnosis and other medical issues and historical records. The patient is  and has a son who is fifteen years old. Patient described as having good family support. Growing up the patient was healthy and only recently has the patient been sick. Patient lives alone. 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 When facing difficult

## 2024-07-19 NOTE — PROGRESS NOTES
PT NOTE- patient just finished ambulating from hospital room to outside patio area and back without difficulty and supervision from OT. He just began eating dinner. Will defer back to back tx as patient just finished amb without difficulty. Patient due to transfer to [facility] for IV antibiotics when bed is available as he does not have insurance for home IV antibiotics.   Dee Sanders, PT, DPT

## 2024-07-19 NOTE — PROGRESS NOTES
[facility] Hospitalist Group                                                                               Hospitalist Progress Note  Chaim Fontaine MD          Date of Service:  2024  NAME:  Steve Martinez  :  1970  MRN:  [id number]    Please note that this dictation was completed with [employer], the computer voice recognition software.  Quite often unanticipated grammatical, syntax, homophones, and other interpretive errors are inadvertently transcribed by the computer software.  Please disregard these errors.  Please excuse any errors that have escaped final proofreading.    Admission Summary:   53 y.o.   male with T1DM, neuropathy admitted for DKA, AMOS and severe sepsis. Over the last few days has had very little appetite and also was weak so was having a hard time getting up to get himself food/drinks etc. Denies ab pain. No N/V. Was constipated but this has resolved. No numbness in his groin or legs. No back pain.              Interval history / Subjective:     Medically stable awaiting placement ar [facility].  Continue IV antibiotic for MSSA bacteremia      I have done physician to physician sign out to Dr Burns at [facility]. Question regarding urology and ID were answered      Assessment & Plan:     Anticipated discharge date : TBD   Anticipated disposition : Cannot return home with IV abx or go to SNF due to lack of insurance. Agreeable to go to [facility] if accepted   Barriers to discharge : medical stability          Severe sepsis - likely 2/2 UTI +/- pyelo, MSSA Bacteremia  MSSA Bacteremia   Blood cultures 2024 for and 2024 positive for MSSA.  Blood cultures 2024 no growth to date.  No IVDA. No recent surgeries/hardware/pacer etc. TTE showed 0.7 cm x 1 cm moderately sized probable vegetation present that is irregular. Repeat BCX NGT. VÍCTOR no vegetation seen      -ID following  -Continue Ancef every 8 hours  -PICC line placed    -Patient to complete  EKG/Monitor Tracing      I have personally and independently reviewed all pertinent labs, diagnostic studies, imaging, and have provided independent interpretation of the same.     Labs:     No results for input(s): \"WBC\", \"HGB\", \"HCT\", \"PLT\" in the last 72 hours.    Recent Labs     07/18/24  0311   *   K 4.4      CO2 28   BUN 17     No results for input(s): \"ALT\", \"TP\", \"GLOB\", \"GGT\" in the last 72 hours.    Invalid input(s): \"SGOT\", \"GPT\", \"AP\", \"TBIL\", \"TBILI\", \"ALB\", \"AML\", \"AMYP\", \"LPSE\", \"HLPSE\"  No results for input(s): \"INR\", \"APTT\" in the last 72 hours.    Invalid input(s): \"PTP\"   No results for input(s): \"TIBC\" in the last 72 hours.    Invalid input(s): \"FE\", \"PSAT\", \"FERR\"   No results found for: \"RBCF\"   No results for input(s): \"PH\", \"PCO2\", \"PO2\" in the last 72 hours.  No results for input(s): \"CPK\" in the last 72 hours.    Invalid input(s): \"CPKMB\", \"CKNDX\", \"TROIQ\"  Lab Results   Component Value Date/Time    CHOL 187 10/04/2022 04:30 PM    HDL 56 10/04/2022 04:30 PM    LDL 96.8 10/04/2022 04:30 PM     No results found for: \"GLUCPOC\"  [unfilled]    Notes reviewed from all clinical/nonclinical/nursing services involved in patient's clinical care. Care coordination discussions were held with appropriate clinical/nonclinical/ nursing providers based on care coordination needs.         Patients current active Medications were reviewed, considered, added and adjusted based on the clinical condition today.      Home Medications were reconciled to the best of my ability given all available resources at the time of admission. Route is PO if not otherwise noted.      Admission Status:[id number]:::1}      Medications Reviewed:     Current Facility-Administered Medications   Medication Dose Route Frequency    insulin glargine (LANTUS) injection vial 20 Units  20 Units SubCUTAneous Daily    [START ON 7/20/2024] loperamide (IMODIUM) capsule 2 mg  2 mg Oral Daily    lactated ringers IV soln infusion

## 2024-07-20 ENCOUNTER — HOSPITAL ENCOUNTER (INPATIENT)
Facility: HOSPITAL | Age: 54
LOS: 21 days | Discharge: HOME | DRG: 871 | End: 2024-08-10
Attending: INTERNAL MEDICINE | Admitting: INTERNAL MEDICINE

## 2024-07-20 VITALS
HEART RATE: 85 BPM | OXYGEN SATURATION: 98 % | SYSTOLIC BLOOD PRESSURE: 109 MMHG | RESPIRATION RATE: 14 BRPM | DIASTOLIC BLOOD PRESSURE: 66 MMHG | TEMPERATURE: 98.2 F | BODY MASS INDEX: 18.66 KG/M2 | WEIGHT: 101.4 LBS | HEIGHT: 62 IN

## 2024-07-20 DIAGNOSIS — E10.42 DIABETIC PERIPHERAL NEUROPATHY ASSOCIATED WITH TYPE 1 DIABETES MELLITUS (HCC): Primary | ICD-10-CM

## 2024-07-20 DIAGNOSIS — A41.9 SEVERE SEPSIS (HCC): ICD-10-CM

## 2024-07-20 DIAGNOSIS — R65.20 SEVERE SEPSIS (HCC): ICD-10-CM

## 2024-07-20 PROBLEM — B95.61 MSSA BACTEREMIA: Status: ACTIVE | Noted: 2024-07-20

## 2024-07-20 PROBLEM — R78.81 MSSA BACTEREMIA: Status: ACTIVE | Noted: 2024-07-20

## 2024-07-20 LAB
GLUCOSE BLD STRIP.AUTO-MCNC: 166 MG/DL (ref 65–117)
GLUCOSE BLD STRIP.AUTO-MCNC: 179 MG/DL (ref 65–117)
GLUCOSE BLD STRIP.AUTO-MCNC: 201 MG/DL (ref 65–117)
GLUCOSE BLD STRIP.AUTO-MCNC: 203 MG/DL (ref 65–117)
SERVICE CMNT-IMP: ABNORMAL

## 2024-07-20 PROCEDURE — 51702 INSERT TEMP BLADDER CATH: CPT

## 2024-07-20 PROCEDURE — 2580000003 HC RX 258: Performed by: INTERNAL MEDICINE

## 2024-07-20 PROCEDURE — 6370000000 HC RX 637 (ALT 250 FOR IP): Performed by: FAMILY MEDICINE

## 2024-07-20 PROCEDURE — 6360000002 HC RX W HCPCS: Performed by: INTERNAL MEDICINE

## 2024-07-20 PROCEDURE — 87040 BLOOD CULTURE FOR BACTERIA: CPT

## 2024-07-20 PROCEDURE — 6360000002 HC RX W HCPCS: Performed by: FAMILY MEDICINE

## 2024-07-20 PROCEDURE — 82962 GLUCOSE BLOOD TEST: CPT

## 2024-07-20 PROCEDURE — 2580000003 HC RX 258: Performed by: FAMILY MEDICINE

## 2024-07-20 PROCEDURE — 6370000000 HC RX 637 (ALT 250 FOR IP)

## 2024-07-20 PROCEDURE — 36415 COLL VENOUS BLD VENIPUNCTURE: CPT

## 2024-07-20 PROCEDURE — 6370000000 HC RX 637 (ALT 250 FOR IP): Performed by: STUDENT IN AN ORGANIZED HEALTH CARE EDUCATION/TRAINING PROGRAM

## 2024-07-20 PROCEDURE — 1200000000 HC SEMI PRIVATE

## 2024-07-20 PROCEDURE — 6370000000 HC RX 637 (ALT 250 FOR IP): Performed by: INTERNAL MEDICINE

## 2024-07-20 RX ORDER — INSULIN LISPRO 100 [IU]/ML
0-4 INJECTION, SOLUTION INTRAVENOUS; SUBCUTANEOUS
Status: CANCELLED | OUTPATIENT
Start: 2024-07-20

## 2024-07-20 RX ORDER — SODIUM CHLORIDE, SODIUM LACTATE, POTASSIUM CHLORIDE, CALCIUM CHLORIDE 600; 310; 30; 20 MG/100ML; MG/100ML; MG/100ML; MG/100ML
INJECTION, SOLUTION INTRAVENOUS CONTINUOUS
Status: CANCELLED | OUTPATIENT
Start: 2024-07-20

## 2024-07-20 RX ORDER — INSULIN GLARGINE 100 [IU]/ML
18 INJECTION, SOLUTION SUBCUTANEOUS EVERY EVENING
Status: DISCONTINUED | OUTPATIENT
Start: 2024-07-21 | End: 2024-07-24

## 2024-07-20 RX ORDER — INSULIN LISPRO 100 [IU]/ML
6 INJECTION, SOLUTION INTRAVENOUS; SUBCUTANEOUS
Status: DISCONTINUED | OUTPATIENT
Start: 2024-07-21 | End: 2024-07-24

## 2024-07-20 RX ORDER — INSULIN LISPRO 100 [IU]/ML
0-4 INJECTION, SOLUTION INTRAVENOUS; SUBCUTANEOUS NIGHTLY
Status: DISCONTINUED | OUTPATIENT
Start: 2024-07-20 | End: 2024-08-10 | Stop reason: HOSPADM

## 2024-07-20 RX ORDER — GABAPENTIN 100 MG/1
100 CAPSULE ORAL 3 TIMES DAILY
Status: DISCONTINUED | OUTPATIENT
Start: 2024-07-20 | End: 2024-08-08

## 2024-07-20 RX ORDER — INSULIN LISPRO 100 [IU]/ML
0-4 INJECTION, SOLUTION INTRAVENOUS; SUBCUTANEOUS NIGHTLY
Status: CANCELLED | OUTPATIENT
Start: 2024-07-20

## 2024-07-20 RX ORDER — GABAPENTIN 100 MG/1
100 CAPSULE ORAL 3 TIMES DAILY
Status: CANCELLED | OUTPATIENT
Start: 2024-07-20

## 2024-07-20 RX ORDER — HEPARIN SODIUM 5000 [USP'U]/ML
5000 INJECTION, SOLUTION INTRAVENOUS; SUBCUTANEOUS 2 TIMES DAILY
Status: DISCONTINUED | OUTPATIENT
Start: 2024-07-20 | End: 2024-08-10 | Stop reason: HOSPADM

## 2024-07-20 RX ORDER — MIDODRINE HYDROCHLORIDE 5 MG/1
5 TABLET ORAL
Status: DISCONTINUED | OUTPATIENT
Start: 2024-07-21 | End: 2024-08-10 | Stop reason: HOSPADM

## 2024-07-20 RX ORDER — ALPRAZOLAM 0.25 MG/1
0.25 TABLET ORAL 4 TIMES DAILY PRN
Status: DISCONTINUED | OUTPATIENT
Start: 2024-07-20 | End: 2024-07-27

## 2024-07-20 RX ORDER — INSULIN LISPRO 100 [IU]/ML
4 INJECTION, SOLUTION INTRAVENOUS; SUBCUTANEOUS
Status: DISCONTINUED | OUTPATIENT
Start: 2024-07-21 | End: 2024-07-24

## 2024-07-20 RX ORDER — MAGNESIUM SULFATE IN WATER 40 MG/ML
2000 INJECTION, SOLUTION INTRAVENOUS PRN
Status: DISCONTINUED | OUTPATIENT
Start: 2024-07-20 | End: 2024-08-10 | Stop reason: HOSPADM

## 2024-07-20 RX ORDER — INSULIN GLARGINE 100 [IU]/ML
18 INJECTION, SOLUTION SUBCUTANEOUS EVERY EVENING
Status: CANCELLED | OUTPATIENT
Start: 2024-07-20

## 2024-07-20 RX ORDER — POTASSIUM CHLORIDE 7.45 MG/ML
10 INJECTION INTRAVENOUS PRN
Status: DISCONTINUED | OUTPATIENT
Start: 2024-07-20 | End: 2024-08-10 | Stop reason: HOSPADM

## 2024-07-20 RX ORDER — SODIUM CHLORIDE 0.9 % (FLUSH) 0.9 %
5-40 SYRINGE (ML) INJECTION PRN
Status: DISCONTINUED | OUTPATIENT
Start: 2024-07-20 | End: 2024-08-10 | Stop reason: HOSPADM

## 2024-07-20 RX ORDER — INSULIN LISPRO 100 [IU]/ML
4 INJECTION, SOLUTION INTRAVENOUS; SUBCUTANEOUS
Status: CANCELLED | OUTPATIENT
Start: 2024-07-20

## 2024-07-20 RX ORDER — HYDROCODONE BITARTRATE AND ACETAMINOPHEN 5; 325 MG/1; MG/1
1 TABLET ORAL EVERY 6 HOURS PRN
Status: DISCONTINUED | OUTPATIENT
Start: 2024-07-20 | End: 2024-07-27

## 2024-07-20 RX ORDER — ONDANSETRON 2 MG/ML
4 INJECTION INTRAMUSCULAR; INTRAVENOUS EVERY 6 HOURS PRN
Status: DISCONTINUED | OUTPATIENT
Start: 2024-07-20 | End: 2024-08-10 | Stop reason: HOSPADM

## 2024-07-20 RX ORDER — LACTOBACILLUS RHAMNOSUS GG 10B CELL
1 CAPSULE ORAL
Status: DISCONTINUED | OUTPATIENT
Start: 2024-07-21 | End: 2024-08-10 | Stop reason: HOSPADM

## 2024-07-20 RX ORDER — LACTOBACILLUS RHAMNOSUS GG 10B CELL
1 CAPSULE ORAL
Status: CANCELLED | OUTPATIENT
Start: 2024-07-21

## 2024-07-20 RX ORDER — LOPERAMIDE HYDROCHLORIDE 2 MG/1
2 CAPSULE ORAL DAILY
Status: CANCELLED | OUTPATIENT
Start: 2024-07-21

## 2024-07-20 RX ORDER — SODIUM CHLORIDE 0.9 % (FLUSH) 0.9 %
5-40 SYRINGE (ML) INJECTION EVERY 12 HOURS SCHEDULED
Status: CANCELLED | OUTPATIENT
Start: 2024-07-20

## 2024-07-20 RX ORDER — ACETAMINOPHEN 325 MG/1
650 TABLET ORAL EVERY 6 HOURS PRN
Status: DISCONTINUED | OUTPATIENT
Start: 2024-07-20 | End: 2024-08-10 | Stop reason: HOSPADM

## 2024-07-20 RX ORDER — HEPARIN SODIUM 5000 [USP'U]/ML
5000 INJECTION, SOLUTION INTRAVENOUS; SUBCUTANEOUS 2 TIMES DAILY
Status: CANCELLED | OUTPATIENT
Start: 2024-07-20

## 2024-07-20 RX ORDER — HYDROCODONE BITARTRATE AND ACETAMINOPHEN 5; 325 MG/1; MG/1
1 TABLET ORAL EVERY 6 HOURS PRN
Status: CANCELLED | OUTPATIENT
Start: 2024-07-20

## 2024-07-20 RX ORDER — INSULIN LISPRO 100 [IU]/ML
4 INJECTION, SOLUTION INTRAVENOUS; SUBCUTANEOUS
Status: CANCELLED | OUTPATIENT
Start: 2024-07-21

## 2024-07-20 RX ORDER — ONDANSETRON 4 MG/1
4 TABLET, ORALLY DISINTEGRATING ORAL EVERY 8 HOURS PRN
Status: DISCONTINUED | OUTPATIENT
Start: 2024-07-20 | End: 2024-08-10 | Stop reason: HOSPADM

## 2024-07-20 RX ORDER — ACETAMINOPHEN 650 MG/1
650 SUPPOSITORY RECTAL EVERY 6 HOURS PRN
Status: DISCONTINUED | OUTPATIENT
Start: 2024-07-20 | End: 2024-08-10 | Stop reason: HOSPADM

## 2024-07-20 RX ORDER — SODIUM CHLORIDE 0.9 % (FLUSH) 0.9 %
5-40 SYRINGE (ML) INJECTION EVERY 12 HOURS SCHEDULED
Status: DISCONTINUED | OUTPATIENT
Start: 2024-07-20 | End: 2024-07-21 | Stop reason: SDUPTHER

## 2024-07-20 RX ORDER — INSULIN LISPRO 100 [IU]/ML
6 INJECTION, SOLUTION INTRAVENOUS; SUBCUTANEOUS
Status: CANCELLED | OUTPATIENT
Start: 2024-07-21

## 2024-07-20 RX ORDER — POLYETHYLENE GLYCOL 3350 17 G/17G
17 POWDER, FOR SOLUTION ORAL DAILY PRN
Status: DISCONTINUED | OUTPATIENT
Start: 2024-07-20 | End: 2024-08-10 | Stop reason: HOSPADM

## 2024-07-20 RX ORDER — SODIUM CHLORIDE 0.9 % (FLUSH) 0.9 %
5-40 SYRINGE (ML) INJECTION EVERY 12 HOURS SCHEDULED
Status: DISCONTINUED | OUTPATIENT
Start: 2024-07-20 | End: 2024-08-10 | Stop reason: HOSPADM

## 2024-07-20 RX ORDER — POTASSIUM CHLORIDE 750 MG/1
40 TABLET, FILM COATED, EXTENDED RELEASE ORAL PRN
Status: DISCONTINUED | OUTPATIENT
Start: 2024-07-20 | End: 2024-08-10 | Stop reason: HOSPADM

## 2024-07-20 RX ORDER — ALPRAZOLAM 0.25 MG/1
0.25 TABLET ORAL 4 TIMES DAILY PRN
Status: CANCELLED | OUTPATIENT
Start: 2024-07-20

## 2024-07-20 RX ORDER — LOPERAMIDE HYDROCHLORIDE 2 MG/1
2 CAPSULE ORAL DAILY
Status: DISCONTINUED | OUTPATIENT
Start: 2024-07-21 | End: 2024-07-25

## 2024-07-20 RX ORDER — SODIUM CHLORIDE 0.9 % (FLUSH) 0.9 %
5-40 SYRINGE (ML) INJECTION PRN
Status: CANCELLED | OUTPATIENT
Start: 2024-07-20

## 2024-07-20 RX ORDER — ONDANSETRON 2 MG/ML
4 INJECTION INTRAMUSCULAR; INTRAVENOUS EVERY 6 HOURS PRN
Status: CANCELLED | OUTPATIENT
Start: 2024-07-20

## 2024-07-20 RX ORDER — SODIUM CHLORIDE 0.9 % (FLUSH) 0.9 %
5-40 SYRINGE (ML) INJECTION PRN
Status: DISCONTINUED | OUTPATIENT
Start: 2024-07-20 | End: 2024-07-21 | Stop reason: SDUPTHER

## 2024-07-20 RX ORDER — ACETAMINOPHEN 325 MG/1
650 TABLET ORAL EVERY 6 HOURS PRN
Status: CANCELLED | OUTPATIENT
Start: 2024-07-20

## 2024-07-20 RX ORDER — INSULIN LISPRO 100 [IU]/ML
0-4 INJECTION, SOLUTION INTRAVENOUS; SUBCUTANEOUS
Status: DISCONTINUED | OUTPATIENT
Start: 2024-07-21 | End: 2024-08-10 | Stop reason: HOSPADM

## 2024-07-20 RX ORDER — MIDODRINE HYDROCHLORIDE 5 MG/1
5 TABLET ORAL
Status: CANCELLED | OUTPATIENT
Start: 2024-07-20

## 2024-07-20 RX ORDER — ONDANSETRON 4 MG/1
4 TABLET, ORALLY DISINTEGRATING ORAL EVERY 8 HOURS PRN
Status: CANCELLED | OUTPATIENT
Start: 2024-07-20

## 2024-07-20 RX ORDER — LIDOCAINE HYDROCHLORIDE 20 MG/ML
JELLY TOPICAL PRN
Status: DISCONTINUED | OUTPATIENT
Start: 2024-07-20 | End: 2024-07-27

## 2024-07-20 RX ORDER — SODIUM CHLORIDE, SODIUM LACTATE, POTASSIUM CHLORIDE, CALCIUM CHLORIDE 600; 310; 30; 20 MG/100ML; MG/100ML; MG/100ML; MG/100ML
INJECTION, SOLUTION INTRAVENOUS CONTINUOUS
Status: DISCONTINUED | OUTPATIENT
Start: 2024-07-20 | End: 2024-08-05

## 2024-07-20 RX ORDER — ACETAMINOPHEN 650 MG/1
650 SUPPOSITORY RECTAL EVERY 6 HOURS PRN
Status: CANCELLED | OUTPATIENT
Start: 2024-07-20

## 2024-07-20 RX ADMIN — Medication 1 CAPSULE: at 09:43

## 2024-07-20 RX ADMIN — HEPARIN SODIUM 5000 UNITS: 5000 INJECTION INTRAVENOUS; SUBCUTANEOUS at 21:31

## 2024-07-20 RX ADMIN — CEFAZOLIN SODIUM 2000 MG: 1 POWDER, FOR SOLUTION INTRAMUSCULAR; INTRAVENOUS at 17:40

## 2024-07-20 RX ADMIN — CEFAZOLIN SODIUM 2000 MG: 1 POWDER, FOR SOLUTION INTRAMUSCULAR; INTRAVENOUS at 02:12

## 2024-07-20 RX ADMIN — INSULIN LISPRO 4 UNITS: 100 INJECTION, SOLUTION INTRAVENOUS; SUBCUTANEOUS at 17:40

## 2024-07-20 RX ADMIN — INSULIN LISPRO 6 UNITS: 100 INJECTION, SOLUTION INTRAVENOUS; SUBCUTANEOUS at 09:44

## 2024-07-20 RX ADMIN — LOPERAMIDE HYDROCHLORIDE 2 MG: 2 CAPSULE ORAL at 09:42

## 2024-07-20 RX ADMIN — INSULIN GLARGINE 18 UNITS: 100 INJECTION, SOLUTION SUBCUTANEOUS at 17:41

## 2024-07-20 RX ADMIN — SODIUM CHLORIDE, PRESERVATIVE FREE 10 ML: 5 INJECTION INTRAVENOUS at 09:44

## 2024-07-20 RX ADMIN — INSULIN LISPRO 4 UNITS: 100 INJECTION, SOLUTION INTRAVENOUS; SUBCUTANEOUS at 13:07

## 2024-07-20 RX ADMIN — CEFAZOLIN SODIUM 2000 MG: 1 POWDER, FOR SOLUTION INTRAMUSCULAR; INTRAVENOUS at 09:43

## 2024-07-20 RX ADMIN — GABAPENTIN 100 MG: 100 CAPSULE ORAL at 21:31

## 2024-07-20 RX ADMIN — GABAPENTIN 100 MG: 100 CAPSULE ORAL at 09:43

## 2024-07-20 RX ADMIN — GABAPENTIN 100 MG: 100 CAPSULE ORAL at 13:06

## 2024-07-20 RX ADMIN — HEPARIN SODIUM 5000 UNITS: 5000 INJECTION INTRAVENOUS; SUBCUTANEOUS at 09:43

## 2024-07-20 RX ADMIN — SODIUM CHLORIDE, POTASSIUM CHLORIDE, SODIUM LACTATE AND CALCIUM CHLORIDE: 600; 310; 30; 20 INJECTION, SOLUTION INTRAVENOUS at 22:50

## 2024-07-20 RX ADMIN — SODIUM CHLORIDE, PRESERVATIVE FREE 10 ML: 5 INJECTION INTRAVENOUS at 21:30

## 2024-07-20 RX ADMIN — MIDODRINE HYDROCHLORIDE 5 MG: 5 TABLET ORAL at 17:40

## 2024-07-20 RX ADMIN — INSULIN LISPRO 1 UNITS: 100 INJECTION, SOLUTION INTRAVENOUS; SUBCUTANEOUS at 13:07

## 2024-07-20 ASSESSMENT — PAIN SCALES - GENERAL
PAINLEVEL_OUTOF10: 0
PAINLEVEL_OUTOF10: 0

## 2024-07-20 NOTE — PLAN OF CARE
Problem: Discharge Planning  Goal: Discharge to home or other facility with appropriate resources  Outcome: Progressing  Flowsheets (Taken 7/20/2024 0001)  Discharge to home or other facility with appropriate resources: Identify barriers to discharge with patient and caregiver     Problem: Safety - Adult  Goal: Free from fall injury  Outcome: Progressing  Flowsheets (Taken 7/20/2024 0001)  Free From Fall Injury: Instruct family/caregiver on patient safety     Problem: Skin/Tissue Integrity  Goal: Absence of new skin breakdown  Description: 1.  Monitor for areas of redness and/or skin breakdown  2.  Assess vascular access sites hourly  3.  Every 4-6 hours minimum:  Change oxygen saturation probe site  4.  Every 4-6 hours:  If on nasal continuous positive airway pressure, respiratory therapy assess nares and determine need for appliance change or resting period.  Outcome: Progressing

## 2024-07-20 NOTE — DISCHARGE SUMMARY
Hospitalist Discharge Summary     Patient ID:  Steve Martinez  [id number]  54 y.o.  1970    Admit date: 7/11/2024    Discharge date and time: 7/20/2024    Admission Diagnoses: Urinary retention [R33.9]  Hyponatremia [E87.1]  Metabolic acidosis [E87.20]  Septicemia (HCC) [A41.9]  Failure to thrive in adult [R62.7]  AMOS (acute kidney injury) (HCC) [N17.9]  Ketoacidosis, diabetic, type 2, no coma (HCC) [E11.10]  Diabetic ketoacidosis without coma associated with type 1 diabetes mellitus (HCC) [E10.10]    Discharge Diagnoses:    Principal Problem:    DKA (diabetic ketoacidosis) ([facility])  Active Problems:    Mixed hyperlipidemia    Severe sepsis (HCC)    AMOS (acute kidney injury) (HCC)    Severe protein-calorie malnutrition (HCC)    Failure to thrive in adult    Metabolic acidosis    Staphylococcus aureus bacteremia    Aortic valve endocarditis    Pyelonephritis    Septicemia (HCC)    Type 1 diabetes mellitus with hyperglycemia ([facility])  Resolved Problems:    * No resolved hospital problems. *         Hospital Course:   53 y.o.   male with T1DM, neuropathy admitted for DKA, AMOS and severe sepsis. Over the last few days has had very little appetite and also was weak so was having a hard time getting up to get himself food/drinks etc. Denies ab pain. No N/V. Was constipated but this has resolved. No numbness in his groin or legs. No back pain.     Patient was found to have MSSA bacteremia.  Source unclear but possibly from left sided pyelonephritis but bacteremia may have caused the UTI as well.  The patient was treated with IV antibiotics and transferred to [facility] for completion of their IV antibiotic course,      By problem:   Severe sepsis - likely 2/2 UTI +/- pyelo, MSSA Bacteremia  MSSA Bacteremia   Blood cultures 7/11/2024 for and 7/12/2024 positive for MSSA.  Blood cultures 7/16/2024 no growth to date.  No IVDA. No recent surgeries/hardware/pacer etc. TTE showed 0.7 cm x 1 cm moderately

## 2024-07-20 NOTE — PROGRESS NOTES
1400: report called to [facility], report given to Hakeem JULES estimated time of  1663  1720: called to check on [facility]  [facility] transfer center said they were 15 min out.   1800: [facility] leaving with [facility] in route to [facility].    This note reflects care provided on 3/14/23. I am the attending responsible for the overall care of this patient today. I have received sign-out from the attending neonatologist from the previous shift. Patient seen and case discussed at bedside.  I have reviewed the physical, radiological and laboratory findings with the team. I was physically present for the key portions of the evaluation and management (E/M) service provided.  Patient is in not in critical condition (intensive) and requires lower levels of observation and physiological monitoring.    Baby Champ Huang (Jarod) is a former 35 1/7 weeks gestation male, DOL 2, with active issues of TTN, IDM, observation for possible sepsis, LGA.    RESP:  s/p bCPAP +5 21% - discontinued on 3/13.  CXR consistent with TTN.   Continue to monitor work of breathing closely    ID:  Blood culture from admission is NGTD.  s/p Ampicillin and Gentamicin x 36 hours    CV:  Continuous cardiopulmonary monitoring.  No murmur appreciated    Heme:  Blood type A+/Lolis negative.  CBC from admission WNL.  Bilirubin:  3/13: 6.9/0.3, 3/14: 8.9/0.3, repeat in AM.    FENGI:  IDM and LGA baby with hypoglycemia on admission which has improved.  Patient weaned off IV fluids 3/14 12AM and with stable glucoses.  Feeding Similac/EBM ad wisam on demand    Neuro:  normal exam.  Weaned to open crib, monitor thermoregulation    Mother updated. Discussed respiratory status, feeds, glucoses, wean to crib and discharge planning

## 2024-07-20 NOTE — CARE COORDINATION
Confirmed transportation to be set by nursing staff by way of Transport/Access center.   [facility] would like arrival before 5 pm.     @1119 case management follow up  Met with patient at bedside, discussed transition to [facility] today.  Patient is in agreement.   Call to [facility] MAGDALENA Penn @ [phone], no room assignment at time of call, will call with room once assigned.    Nurse to call report to [phone] Palacio RN      @ [phone] Case management follow up  Call to [facility] @ [phone], secure  left for return call.  Call to Bruna cordova @ [phone], states she will have on call cm return.  Believes bed availability will be Monday.  PREET

## 2024-07-20 NOTE — CARE COORDINATION
RUR: 6%    Patient accepted for transfer to [facility] for extended care.  CM spoke with Dr. Gilbert and Amalia nursing supervisor.     CM spoke with Dee ug patient does   Not have a sitter.     Please arranged transportation for day and attempt for patient to arrive before 5:00 PM if possible  RN please call report to the floor at [phone]  CM please call report to [phone].    Fili

## 2024-07-20 NOTE — DISCHARGE INSTRUCTIONS
HOSPITALIST DISCHARGE INSTRUCTIONS  NAME:  Steve Martinez   :  1970   MRN:  [id number]     Date/Time:  2024 11:53 AM    ADMIT DATE: 2024     DISCHARGE DATE: 2024     DISCHARGE DIAGNOSIS:  bacteremia    DISCHARGE INSTRUCTIONS:  Thank you for allowing us to participate in your care. Your discharging Hospitalist is Nic Wilson MD. You were admitted for evaluation and treatment of the above.         MEDICATIONS:    It is important that you take the medication exactly as they are prescribed.   Keep your medication in the bottles provided by the pharmacist and keep a list of the medication names, dosages, and times to be taken in your wallet.   Do not take other medications without consulting your doctor.             If you experience any of the following symptoms then please call your primary care physician or return to the emergency room if you cannot get hold of your doctor:  Fever, chills, nausea, vomiting, diarrhea, change in mentation, falling, bleeding, shortness of breath    Follow Up:  Please call the below provider to arrange hospital follow up appointment      Rima Giron MD  [address] [place] [id number] [phone]    Schedule an appointment as soon as possible for a visit          Information obtained by :  I understand that if any problems occur once I am at home I am to contact my physician.    I understand and acknowledge receipt of the instructions indicated above.                                                                                                                                           Physician's or R.N.'s Signature                                                                  Date/Time                                                                                                                                              Patient or Representative Signature                                                          Date/Time

## 2024-07-20 NOTE — FLOWSHEET NOTE
07/20/24 0740   Handoff   Communication Given Shift Handoff  (Patient handoff given to oncoming nurse, no s/s of acute distress noted, pt resting in bed, standard safety precaution in place before exiting room. Oncoming nurse updated on current plan of care.)   Handoff Given To Diane   Handoff Received From Komal STANLEY RN   Handoff Communication Face to Face;At bedside   Time Handoff Given 0715     PICC line insertion site benign, area free & clear of leaking, no s/s of infection or bleeding, IVF infusing at this time.

## 2024-07-21 LAB
ANION GAP SERPL CALC-SCNC: 6 MMOL/L (ref 5–15)
APPEARANCE UR: CLEAR
BACTERIA SPEC CULT: NORMAL
BACTERIA SPEC CULT: NORMAL
BACTERIA URNS QL MICRO: NEGATIVE /HPF
BASOPHILS # BLD: 0.1 K/UL (ref 0–0.1)
BASOPHILS NFR BLD: 1 % (ref 0–1)
BILIRUB UR QL: NEGATIVE
BUN SERPL-MCNC: 25 MG/DL (ref 6–20)
BUN/CREAT SERPL: 16 (ref 12–20)
CALCIUM SERPL-MCNC: 8.4 MG/DL (ref 8.5–10.1)
CHLORIDE SERPL-SCNC: 101 MMOL/L (ref 97–108)
CO2 SERPL-SCNC: 32 MMOL/L (ref 21–32)
COLOR UR: ABNORMAL
CREAT SERPL-MCNC: 1.58 MG/DL (ref 0.7–1.3)
DIFFERENTIAL METHOD BLD: ABNORMAL
EOSINOPHIL # BLD: 0.1 K/UL (ref 0–0.4)
EOSINOPHIL NFR BLD: 1 % (ref 0–7)
EPITH CASTS URNS QL MICRO: ABNORMAL /LPF
ERYTHROCYTE [DISTWIDTH] IN BLOOD BY AUTOMATED COUNT: 12.3 % (ref 11.5–14.5)
GLUCOSE BLD STRIP.AUTO-MCNC: 133 MG/DL (ref 65–117)
GLUCOSE BLD STRIP.AUTO-MCNC: 145 MG/DL (ref 65–117)
GLUCOSE BLD STRIP.AUTO-MCNC: 178 MG/DL (ref 65–117)
GLUCOSE BLD STRIP.AUTO-MCNC: 238 MG/DL (ref 65–117)
GLUCOSE SERPL-MCNC: 125 MG/DL (ref 65–100)
GLUCOSE UR STRIP.AUTO-MCNC: 500 MG/DL
HCT VFR BLD AUTO: 25.8 % (ref 36.6–50.3)
HGB BLD-MCNC: 8.5 G/DL (ref 12.1–17)
HGB UR QL STRIP: ABNORMAL
IMM GRANULOCYTES # BLD AUTO: 0.4 K/UL (ref 0–0.04)
IMM GRANULOCYTES NFR BLD AUTO: 3 % (ref 0–0.5)
KETONES UR QL STRIP.AUTO: NEGATIVE MG/DL
LEUKOCYTE ESTERASE UR QL STRIP.AUTO: ABNORMAL
LYMPHOCYTES # BLD: 1.7 K/UL (ref 0.8–3.5)
LYMPHOCYTES NFR BLD: 12 % (ref 12–49)
MCH RBC QN AUTO: 31.1 PG (ref 26–34)
MCHC RBC AUTO-ENTMCNC: 32.9 G/DL (ref 30–36.5)
MCV RBC AUTO: 94.5 FL (ref 80–99)
MONOCYTES # BLD: 0.6 K/UL (ref 0–1)
MONOCYTES NFR BLD: 4 % (ref 5–13)
NEUTS SEG # BLD: 11.4 K/UL (ref 1.8–8)
NEUTS SEG NFR BLD: 79 % (ref 32–75)
NITRITE UR QL STRIP.AUTO: NEGATIVE
NRBC # BLD: 0 K/UL (ref 0–0.01)
NRBC BLD-RTO: 0 PER 100 WBC
PH UR STRIP: 8 (ref 5–8)
PLATELET # BLD AUTO: 314 K/UL (ref 150–400)
PMV BLD AUTO: 9.2 FL (ref 8.9–12.9)
POTASSIUM SERPL-SCNC: 4.4 MMOL/L (ref 3.5–5.1)
PROT UR STRIP-MCNC: 30 MG/DL
RBC # BLD AUTO: 2.73 M/UL (ref 4.1–5.7)
RBC #/AREA URNS HPF: ABNORMAL /HPF (ref 0–5)
RBC MORPH BLD: ABNORMAL
SERVICE CMNT-IMP: ABNORMAL
SERVICE CMNT-IMP: NORMAL
SERVICE CMNT-IMP: NORMAL
SODIUM SERPL-SCNC: 139 MMOL/L (ref 136–145)
SP GR UR REFRACTOMETRY: 1.01
URINE CULTURE IF INDICATED: ABNORMAL
UROBILINOGEN UR QL STRIP.AUTO: 0.2 EU/DL (ref 0.2–1)
WBC # BLD AUTO: 14.3 K/UL (ref 4.1–11.1)
WBC URNS QL MICRO: ABNORMAL /HPF (ref 0–4)

## 2024-07-21 PROCEDURE — 36592 COLLECT BLOOD FROM PICC: CPT

## 2024-07-21 PROCEDURE — 80048 BASIC METABOLIC PNL TOTAL CA: CPT

## 2024-07-21 PROCEDURE — 6360000002 HC RX W HCPCS: Performed by: INTERNAL MEDICINE

## 2024-07-21 PROCEDURE — 2580000003 HC RX 258: Performed by: FAMILY MEDICINE

## 2024-07-21 PROCEDURE — 82962 GLUCOSE BLOOD TEST: CPT

## 2024-07-21 PROCEDURE — 6360000002 HC RX W HCPCS: Performed by: FAMILY MEDICINE

## 2024-07-21 PROCEDURE — 6370000000 HC RX 637 (ALT 250 FOR IP): Performed by: INTERNAL MEDICINE

## 2024-07-21 PROCEDURE — 2580000003 HC RX 258: Performed by: INTERNAL MEDICINE

## 2024-07-21 PROCEDURE — 6370000000 HC RX 637 (ALT 250 FOR IP): Performed by: FAMILY MEDICINE

## 2024-07-21 PROCEDURE — 51702 INSERT TEMP BLADDER CATH: CPT

## 2024-07-21 PROCEDURE — 85025 COMPLETE CBC W/AUTO DIFF WBC: CPT

## 2024-07-21 PROCEDURE — 1200000000 HC SEMI PRIVATE

## 2024-07-21 PROCEDURE — 81001 URINALYSIS AUTO W/SCOPE: CPT

## 2024-07-21 RX ADMIN — HEPARIN SODIUM 5000 UNITS: 5000 INJECTION INTRAVENOUS; SUBCUTANEOUS at 08:48

## 2024-07-21 RX ADMIN — CEFAZOLIN 2000 MG: 10 INJECTION, POWDER, FOR SOLUTION INTRAVENOUS at 18:33

## 2024-07-21 RX ADMIN — SODIUM CHLORIDE, PRESERVATIVE FREE 10 ML: 5 INJECTION INTRAVENOUS at 22:10

## 2024-07-21 RX ADMIN — CEFAZOLIN 2000 MG: 10 INJECTION, POWDER, FOR SOLUTION INTRAVENOUS at 10:54

## 2024-07-21 RX ADMIN — Medication 1 CAPSULE: at 08:50

## 2024-07-21 RX ADMIN — INSULIN LISPRO 4 UNITS: 100 INJECTION, SOLUTION INTRAVENOUS; SUBCUTANEOUS at 12:46

## 2024-07-21 RX ADMIN — HEPARIN SODIUM 5000 UNITS: 5000 INJECTION INTRAVENOUS; SUBCUTANEOUS at 22:09

## 2024-07-21 RX ADMIN — GABAPENTIN 100 MG: 100 CAPSULE ORAL at 14:50

## 2024-07-21 RX ADMIN — INSULIN LISPRO 4 UNITS: 100 INJECTION, SOLUTION INTRAVENOUS; SUBCUTANEOUS at 17:20

## 2024-07-21 RX ADMIN — MIDODRINE HYDROCHLORIDE 5 MG: 5 TABLET ORAL at 12:46

## 2024-07-21 RX ADMIN — GABAPENTIN 100 MG: 100 CAPSULE ORAL at 22:09

## 2024-07-21 RX ADMIN — INSULIN GLARGINE 18 UNITS: 100 INJECTION, SOLUTION SUBCUTANEOUS at 18:33

## 2024-07-21 RX ADMIN — MIDODRINE HYDROCHLORIDE 5 MG: 5 TABLET ORAL at 08:49

## 2024-07-21 RX ADMIN — LOPERAMIDE HYDROCHLORIDE 2 MG: 2 CAPSULE ORAL at 08:49

## 2024-07-21 RX ADMIN — INSULIN LISPRO 6 UNITS: 100 INJECTION, SOLUTION INTRAVENOUS; SUBCUTANEOUS at 08:49

## 2024-07-21 RX ADMIN — Medication 1 UNITS: at 17:21

## 2024-07-21 RX ADMIN — CEFAZOLIN 2000 MG: 10 INJECTION, POWDER, FOR SOLUTION INTRAVENOUS at 02:17

## 2024-07-21 RX ADMIN — GABAPENTIN 100 MG: 100 CAPSULE ORAL at 08:49

## 2024-07-21 RX ADMIN — LIDOCAINE HYDROCHLORIDE: 20 JELLY TOPICAL at 00:41

## 2024-07-21 RX ADMIN — MIDODRINE HYDROCHLORIDE 5 MG: 5 TABLET ORAL at 17:19

## 2024-07-21 RX ADMIN — SODIUM CHLORIDE, POTASSIUM CHLORIDE, SODIUM LACTATE AND CALCIUM CHLORIDE 1000 ML: 600; 310; 30; 20 INJECTION, SOLUTION INTRAVENOUS at 18:42

## 2024-07-21 RX ADMIN — SODIUM CHLORIDE, PRESERVATIVE FREE 10 ML: 5 INJECTION INTRAVENOUS at 10:54

## 2024-07-21 ASSESSMENT — PAIN SCALES - GENERAL
PAINLEVEL_OUTOF10: 0
PAINLEVEL_OUTOF10: 0

## 2024-07-21 NOTE — H&P
surgeries/hardware/pacer etc. TTE showed aortic 0.7 cm x 1 cm moderately sized probable vegetation present that is irregular. EF: 55 to 60%  -VÍCTOR did not show valvular vegetation  -Repeat blood cultures(07/16/2024):NGTD  -Weekly CBC,BUN,AST/TotalBilirubin/AlkalinePhosphatase. Fax lab to [phone] Call Critical Lab Values to [phone].   -Complete course of IV ABX(Cefazolin) till 08/08/2024.  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  Right hydronephrosis.  -Hold flomax due to persistent hypotension.   -Discussed with referring provider who checked with urology service. As per urology recommendations, attempt voiding trial next week and if passes, monitor post void residuals intermittently. If PVR>400 after first void, replace foleys catheter(can keep mendes's 4 weeks prior to exchange) . Follow-up with urology as outpatient.  Patient to call and arrange OP FU with [facility] for VT, ? UD study, repeat TRISTAN      Type 1 diabetes mellitus   DKA (diabetic ketoacidosis) at referring facility(resolved)  Hyponatremia(likely pseudohyponatremia and ?IVVD as well as poor PO intakeResolved)   likely triggered by dehydration and infection. S/p insulin/bicarb gtt.   -A1c >14  -on lantus 18 QHS, humalog 4 TIDPC  -SSI, accucheck TIDAC & qHS, hypoglycemia protocol     Diarrhea (improving)  Due to pronounced gastrocolic reflex from history.   - Trial cholestyramine and Immodium QD   - Outpatient GI follow up.  -currently 1 BM per day     AMOS (resolved)  Likely 2/2 IVVD 2/2 glucosuria and poor PO intake.   -Urinary retention likely contributing. renal US showed Echogenic kidneys compatible with medical renal disease. Right hydronephrosis. unchanged compared to the prior CT. HbA1c >14, possible diabetic cystopathy.

## 2024-07-22 LAB
BACTERIA SPEC CULT: NORMAL
BACTERIA SPEC CULT: NORMAL
ECHO BSA: 1.4 M2
GLUCOSE BLD STRIP.AUTO-MCNC: 186 MG/DL (ref 65–117)
GLUCOSE BLD STRIP.AUTO-MCNC: 194 MG/DL (ref 65–117)
GLUCOSE BLD STRIP.AUTO-MCNC: 283 MG/DL (ref 65–117)
GLUCOSE BLD STRIP.AUTO-MCNC: 289 MG/DL (ref 65–117)
SERVICE CMNT-IMP: ABNORMAL
SERVICE CMNT-IMP: NORMAL
SERVICE CMNT-IMP: NORMAL

## 2024-07-22 PROCEDURE — 97165 OT EVAL LOW COMPLEX 30 MIN: CPT

## 2024-07-22 PROCEDURE — 6360000002 HC RX W HCPCS: Performed by: FAMILY MEDICINE

## 2024-07-22 PROCEDURE — 2580000003 HC RX 258: Performed by: INTERNAL MEDICINE

## 2024-07-22 PROCEDURE — 1200000000 HC SEMI PRIVATE

## 2024-07-22 PROCEDURE — 93312 ECHO TRANSESOPHAGEAL: CPT | Performed by: INTERNAL MEDICINE

## 2024-07-22 PROCEDURE — 2580000003 HC RX 258: Performed by: FAMILY MEDICINE

## 2024-07-22 PROCEDURE — 93325 DOPPLER ECHO COLOR FLOW MAPG: CPT | Performed by: INTERNAL MEDICINE

## 2024-07-22 PROCEDURE — 6370000000 HC RX 637 (ALT 250 FOR IP): Performed by: FAMILY MEDICINE

## 2024-07-22 PROCEDURE — 6360000002 HC RX W HCPCS: Performed by: INTERNAL MEDICINE

## 2024-07-22 PROCEDURE — 97535 SELF CARE MNGMENT TRAINING: CPT

## 2024-07-22 PROCEDURE — 82962 GLUCOSE BLOOD TEST: CPT

## 2024-07-22 RX ORDER — NALOXONE HYDROCHLORIDE 0.4 MG/ML
0.4 INJECTION, SOLUTION INTRAMUSCULAR; INTRAVENOUS; SUBCUTANEOUS PRN
Status: DISCONTINUED | OUTPATIENT
Start: 2024-07-22 | End: 2024-08-10 | Stop reason: HOSPADM

## 2024-07-22 RX ADMIN — SODIUM CHLORIDE, PRESERVATIVE FREE 10 ML: 5 INJECTION INTRAVENOUS at 21:13

## 2024-07-22 RX ADMIN — HEPARIN SODIUM 5000 UNITS: 5000 INJECTION INTRAVENOUS; SUBCUTANEOUS at 08:32

## 2024-07-22 RX ADMIN — CEFAZOLIN 2000 MG: 10 INJECTION, POWDER, FOR SOLUTION INTRAVENOUS at 10:32

## 2024-07-22 RX ADMIN — HEPARIN SODIUM 5000 UNITS: 5000 INJECTION INTRAVENOUS; SUBCUTANEOUS at 21:13

## 2024-07-22 RX ADMIN — SODIUM CHLORIDE, POTASSIUM CHLORIDE, SODIUM LACTATE AND CALCIUM CHLORIDE 1000 ML: 600; 310; 30; 20 INJECTION, SOLUTION INTRAVENOUS at 14:10

## 2024-07-22 RX ADMIN — INSULIN LISPRO 4 UNITS: 100 INJECTION, SOLUTION INTRAVENOUS; SUBCUTANEOUS at 12:25

## 2024-07-22 RX ADMIN — GABAPENTIN 100 MG: 100 CAPSULE ORAL at 21:13

## 2024-07-22 RX ADMIN — MIDODRINE HYDROCHLORIDE 5 MG: 5 TABLET ORAL at 08:32

## 2024-07-22 RX ADMIN — MIDODRINE HYDROCHLORIDE 5 MG: 5 TABLET ORAL at 12:26

## 2024-07-22 RX ADMIN — GABAPENTIN 100 MG: 100 CAPSULE ORAL at 14:09

## 2024-07-22 RX ADMIN — INSULIN LISPRO 6 UNITS: 100 INJECTION, SOLUTION INTRAVENOUS; SUBCUTANEOUS at 08:33

## 2024-07-22 RX ADMIN — GABAPENTIN 100 MG: 100 CAPSULE ORAL at 08:32

## 2024-07-22 RX ADMIN — CEFAZOLIN 2000 MG: 10 INJECTION, POWDER, FOR SOLUTION INTRAVENOUS at 18:49

## 2024-07-22 RX ADMIN — CEFAZOLIN 2000 MG: 10 INJECTION, POWDER, FOR SOLUTION INTRAVENOUS at 02:33

## 2024-07-22 RX ADMIN — Medication 2 UNITS: at 08:33

## 2024-07-22 RX ADMIN — SODIUM CHLORIDE, PRESERVATIVE FREE 10 ML: 5 INJECTION INTRAVENOUS at 08:34

## 2024-07-22 RX ADMIN — INSULIN LISPRO 4 UNITS: 100 INJECTION, SOLUTION INTRAVENOUS; SUBCUTANEOUS at 17:06

## 2024-07-22 RX ADMIN — LOPERAMIDE HYDROCHLORIDE 2 MG: 2 CAPSULE ORAL at 08:32

## 2024-07-22 RX ADMIN — Medication 1 CAPSULE: at 08:32

## 2024-07-22 RX ADMIN — INSULIN GLARGINE 18 UNITS: 100 INJECTION, SOLUTION SUBCUTANEOUS at 17:06

## 2024-07-22 RX ADMIN — MIDODRINE HYDROCHLORIDE 5 MG: 5 TABLET ORAL at 17:06

## 2024-07-23 LAB
ALBUMIN SERPL-MCNC: 2.1 G/DL (ref 3.5–5)
ALBUMIN/GLOB SERPL: 0.5 (ref 1.1–2.2)
ALP SERPL-CCNC: 95 U/L (ref 45–117)
ALT SERPL-CCNC: 6 U/L (ref 12–78)
ANION GAP SERPL CALC-SCNC: 3 MMOL/L (ref 5–15)
AST SERPL-CCNC: 13 U/L (ref 15–37)
BASOPHILS # BLD: 0.2 K/UL (ref 0–0.1)
BASOPHILS NFR BLD: 1 % (ref 0–1)
BILIRUB SERPL-MCNC: 0.1 MG/DL (ref 0.2–1)
BUN SERPL-MCNC: 29 MG/DL (ref 6–20)
BUN/CREAT SERPL: 13 (ref 12–20)
CALCIUM SERPL-MCNC: 8.3 MG/DL (ref 8.5–10.1)
CHLORIDE SERPL-SCNC: 90 MMOL/L (ref 97–108)
CO2 SERPL-SCNC: 30 MMOL/L (ref 21–32)
CREAT SERPL-MCNC: 2.15 MG/DL (ref 0.7–1.3)
DIFFERENTIAL METHOD BLD: ABNORMAL
EOSINOPHIL # BLD: 0.2 K/UL (ref 0–0.4)
EOSINOPHIL NFR BLD: 1 % (ref 0–7)
ERYTHROCYTE [DISTWIDTH] IN BLOOD BY AUTOMATED COUNT: 12.5 % (ref 11.5–14.5)
GLOBULIN SER CALC-MCNC: 4.3 G/DL (ref 2–4)
GLUCOSE BLD STRIP.AUTO-MCNC: 188 MG/DL (ref 65–117)
GLUCOSE BLD STRIP.AUTO-MCNC: 285 MG/DL (ref 65–117)
GLUCOSE BLD STRIP.AUTO-MCNC: 331 MG/DL (ref 65–117)
GLUCOSE SERPL-MCNC: 244 MG/DL (ref 65–100)
HCT VFR BLD AUTO: 26.8 % (ref 36.6–50.3)
HGB BLD-MCNC: 8.8 G/DL (ref 12.1–17)
IMM GRANULOCYTES # BLD AUTO: 0.3 K/UL (ref 0–0.04)
IMM GRANULOCYTES NFR BLD AUTO: 2 % (ref 0–0.5)
LYMPHOCYTES # BLD: 2 K/UL (ref 0.8–3.5)
LYMPHOCYTES NFR BLD: 13 % (ref 12–49)
MAGNESIUM SERPL-MCNC: 1.7 MG/DL (ref 1.6–2.4)
MCH RBC QN AUTO: 31.5 PG (ref 26–34)
MCHC RBC AUTO-ENTMCNC: 32.8 G/DL (ref 30–36.5)
MCV RBC AUTO: 96.1 FL (ref 80–99)
MONOCYTES # BLD: 0.6 K/UL (ref 0–1)
MONOCYTES NFR BLD: 4 % (ref 5–13)
NEUTS SEG # BLD: 12.2 K/UL (ref 1.8–8)
NEUTS SEG NFR BLD: 79 % (ref 32–75)
NRBC # BLD: 0 K/UL (ref 0–0.01)
NRBC BLD-RTO: 0 PER 100 WBC
OSMOLALITY SERPL: 304 MOSM/KG H2O
OSMOLALITY UR: 493 MOSM/KG H2O
PHOSPHATE SERPL-MCNC: 3.1 MG/DL (ref 2.6–4.7)
PLATELET # BLD AUTO: 321 K/UL (ref 150–400)
PMV BLD AUTO: 9.4 FL (ref 8.9–12.9)
POTASSIUM SERPL-SCNC: 4.4 MMOL/L (ref 3.5–5.1)
POTASSIUM UR-SCNC: 68 MMOL/L
PROT SERPL-MCNC: 6.4 G/DL (ref 6.4–8.2)
RBC # BLD AUTO: 2.79 M/UL (ref 4.1–5.7)
RBC MORPH BLD: ABNORMAL
SERVICE CMNT-IMP: ABNORMAL
SODIUM SERPL-SCNC: 123 MMOL/L (ref 136–145)
SODIUM UR-SCNC: 56 MMOL/L
WBC # BLD AUTO: 15.5 K/UL (ref 4.1–11.1)

## 2024-07-23 PROCEDURE — 2580000003 HC RX 258: Performed by: FAMILY MEDICINE

## 2024-07-23 PROCEDURE — 84100 ASSAY OF PHOSPHORUS: CPT

## 2024-07-23 PROCEDURE — 85025 COMPLETE CBC W/AUTO DIFF WBC: CPT

## 2024-07-23 PROCEDURE — 83970 ASSAY OF PARATHORMONE: CPT

## 2024-07-23 PROCEDURE — 1200000000 HC SEMI PRIVATE

## 2024-07-23 PROCEDURE — 36415 COLL VENOUS BLD VENIPUNCTURE: CPT

## 2024-07-23 PROCEDURE — 2580000003 HC RX 258: Performed by: INTERNAL MEDICINE

## 2024-07-23 PROCEDURE — 82962 GLUCOSE BLOOD TEST: CPT

## 2024-07-23 PROCEDURE — 84300 ASSAY OF URINE SODIUM: CPT

## 2024-07-23 PROCEDURE — 97161 PT EVAL LOW COMPLEX 20 MIN: CPT | Performed by: PHYSICAL THERAPIST

## 2024-07-23 PROCEDURE — 51701 INSERT BLADDER CATHETER: CPT

## 2024-07-23 PROCEDURE — 6370000000 HC RX 637 (ALT 250 FOR IP): Performed by: FAMILY MEDICINE

## 2024-07-23 PROCEDURE — 6370000000 HC RX 637 (ALT 250 FOR IP): Performed by: INTERNAL MEDICINE

## 2024-07-23 PROCEDURE — 80053 COMPREHEN METABOLIC PANEL: CPT

## 2024-07-23 PROCEDURE — 6360000002 HC RX W HCPCS: Performed by: FAMILY MEDICINE

## 2024-07-23 PROCEDURE — 82306 VITAMIN D 25 HYDROXY: CPT

## 2024-07-23 PROCEDURE — 6360000002 HC RX W HCPCS: Performed by: INTERNAL MEDICINE

## 2024-07-23 PROCEDURE — 83935 ASSAY OF URINE OSMOLALITY: CPT

## 2024-07-23 PROCEDURE — 51798 US URINE CAPACITY MEASURE: CPT

## 2024-07-23 PROCEDURE — 84133 ASSAY OF URINE POTASSIUM: CPT

## 2024-07-23 PROCEDURE — 83735 ASSAY OF MAGNESIUM: CPT

## 2024-07-23 PROCEDURE — 83930 ASSAY OF BLOOD OSMOLALITY: CPT

## 2024-07-23 RX ADMIN — GABAPENTIN 100 MG: 100 CAPSULE ORAL at 14:28

## 2024-07-23 RX ADMIN — INSULIN LISPRO 4 UNITS: 100 INJECTION, SOLUTION INTRAVENOUS; SUBCUTANEOUS at 12:15

## 2024-07-23 RX ADMIN — SODIUM CHLORIDE, PRESERVATIVE FREE 10 ML: 5 INJECTION INTRAVENOUS at 08:37

## 2024-07-23 RX ADMIN — CEFAZOLIN 2000 MG: 10 INJECTION, POWDER, FOR SOLUTION INTRAVENOUS at 02:27

## 2024-07-23 RX ADMIN — INSULIN LISPRO 6 UNITS: 100 INJECTION, SOLUTION INTRAVENOUS; SUBCUTANEOUS at 08:35

## 2024-07-23 RX ADMIN — INSULIN GLARGINE 18 UNITS: 100 INJECTION, SOLUTION SUBCUTANEOUS at 17:45

## 2024-07-23 RX ADMIN — Medication 1 CAPSULE: at 08:36

## 2024-07-23 RX ADMIN — MIDODRINE HYDROCHLORIDE 5 MG: 5 TABLET ORAL at 12:15

## 2024-07-23 RX ADMIN — CEFAZOLIN 2000 MG: 10 INJECTION, POWDER, FOR SOLUTION INTRAVENOUS at 17:45

## 2024-07-23 RX ADMIN — HEPARIN SODIUM 5000 UNITS: 5000 INJECTION INTRAVENOUS; SUBCUTANEOUS at 08:36

## 2024-07-23 RX ADMIN — GABAPENTIN 100 MG: 100 CAPSULE ORAL at 08:37

## 2024-07-23 RX ADMIN — SODIUM CHLORIDE, POTASSIUM CHLORIDE, SODIUM LACTATE AND CALCIUM CHLORIDE 1000 ML: 600; 310; 30; 20 INJECTION, SOLUTION INTRAVENOUS at 14:30

## 2024-07-23 RX ADMIN — HEPARIN SODIUM 5000 UNITS: 5000 INJECTION INTRAVENOUS; SUBCUTANEOUS at 21:23

## 2024-07-23 RX ADMIN — SODIUM CHLORIDE, POTASSIUM CHLORIDE, SODIUM LACTATE AND CALCIUM CHLORIDE: 600; 310; 30; 20 INJECTION, SOLUTION INTRAVENOUS at 03:33

## 2024-07-23 RX ADMIN — MIDODRINE HYDROCHLORIDE 5 MG: 5 TABLET ORAL at 08:36

## 2024-07-23 RX ADMIN — LOPERAMIDE HYDROCHLORIDE 2 MG: 2 CAPSULE ORAL at 08:36

## 2024-07-23 RX ADMIN — MIDODRINE HYDROCHLORIDE 5 MG: 5 TABLET ORAL at 17:45

## 2024-07-23 RX ADMIN — Medication 3 UNITS: at 08:35

## 2024-07-23 RX ADMIN — LIDOCAINE HYDROCHLORIDE: 20 JELLY TOPICAL at 22:37

## 2024-07-23 RX ADMIN — CEFAZOLIN 2000 MG: 10 INJECTION, POWDER, FOR SOLUTION INTRAVENOUS at 11:12

## 2024-07-23 RX ADMIN — INSULIN LISPRO 4 UNITS: 100 INJECTION, SOLUTION INTRAVENOUS; SUBCUTANEOUS at 17:45

## 2024-07-23 RX ADMIN — Medication 2 UNITS: at 17:46

## 2024-07-23 RX ADMIN — SODIUM CHLORIDE, PRESERVATIVE FREE 10 ML: 5 INJECTION INTRAVENOUS at 21:24

## 2024-07-23 RX ADMIN — GABAPENTIN 100 MG: 100 CAPSULE ORAL at 21:23

## 2024-07-23 ASSESSMENT — PAIN SCALES - GENERAL
PAINLEVEL_OUTOF10: 0
PAINLEVEL_OUTOF10: 0

## 2024-07-23 NOTE — CARE COORDINATION
RUR: 16%  Readmission? Yes - transfer from [facility]? N/A - self-pay patient  ? N/A    Plan: home with outpatient services.    CM met with pt at bedside to discuss discharge plan and complete initial case management assessment. Pt reports significant family support. Family will transport pt home from the hospital upon discharge (CM to clarify closer to discharge date).    Pharmacy: [address] -  [phone] -  [phone] [[id number]]     Pt lives alone at the address listed on his facesheet. Pt reports that he uses a cane intermittently at home and was previously independent in his ADLs/IADLs. Pt does not have a hx of home O2, home health services, or a stay at a SNF for rehab services. Pt is not a . Pt receives about $2000 per month through [employer]. Pt does not currently have health insurance.     Per [facility], pt is over-income for Medicaid at this time. Pt is concerned about this due to worries that he will not be able to afford his medications upon discharge. Pt is also concerned about receiving a large medical bill from his hospital stay. CM to provide pt with [employer] financial assistance application. Pt does not think that he will be able to afford health insurance through the Market[employer]. CM provided empathetic listening and assured pt that case management will do what they can to assist with his needs.    Pt currently pays for his insulin out of pocket. Pt pays out of pocket for his primary care appointments and specialist appointments through [facility].     ACP discussed. Patient has a living father (Legal Next of Kin), but he would like his youngest sister (Melyssa Birch, [phone]) to be his primary healthcare decision maker if he is unable to make decisions on his own. CM explained LNOK process. Pt would like a consult to spiritual care to receive assistance with making an ACP document while in the hospital.     Case

## 2024-07-23 NOTE — CARE COORDINATION
RUR: 16%     07/23/24 0900   Readmission Assessment   Number of Days since last admission? 1-7 days   Previous Disposition Other (comment)  (N/A extended stay transfer)   Who is being Interviewed Patient   What was the patient's/caregiver's perception as to why they think they needed to return back to the hospital? Other (Comment)  (N/A extended stay transfer)   Did you visit your Primary Care Physician after you left the hospital, before you returned this time? Yes   Did you see a specialist, such as Cardiac, Pulmonary, Orthopedic Physician, etc. after you left the hospital? Yes   Who advised the patient to return to the hospital? Other (Comment)  (N/A extended stay transfer)   Does the patient report anything that got in the way of taking their medications? No   In our efforts to provide the best possible care to you and others like you, can you think of anything that we could have done to help you after you left the hospital the first time, so that you might not have needed to return so soon? Other (Comment)  (N/A extended stay transfer)     Full CM assessment in chart.    Kalpana Andre LMSW,   [phone]

## 2024-07-24 LAB
25(OH)D3 SERPL-MCNC: 31.9 NG/ML (ref 30–100)
CALCIUM SERPL-MCNC: 8.7 MG/DL (ref 8.5–10.1)
GLUCOSE BLD STRIP.AUTO-MCNC: 227 MG/DL (ref 65–117)
GLUCOSE BLD STRIP.AUTO-MCNC: 231 MG/DL (ref 65–117)
GLUCOSE BLD STRIP.AUTO-MCNC: 296 MG/DL (ref 65–117)
GLUCOSE BLD STRIP.AUTO-MCNC: 337 MG/DL (ref 65–117)
PTH-INTACT SERPL-MCNC: 46.7 PG/ML (ref 18.4–88)
SERVICE CMNT-IMP: ABNORMAL

## 2024-07-24 PROCEDURE — 97530 THERAPEUTIC ACTIVITIES: CPT | Performed by: PHYSICAL THERAPIST

## 2024-07-24 PROCEDURE — 2580000003 HC RX 258: Performed by: INTERNAL MEDICINE

## 2024-07-24 PROCEDURE — 82962 GLUCOSE BLOOD TEST: CPT

## 2024-07-24 PROCEDURE — 6360000002 HC RX W HCPCS: Performed by: FAMILY MEDICINE

## 2024-07-24 PROCEDURE — 6360000002 HC RX W HCPCS: Performed by: INTERNAL MEDICINE

## 2024-07-24 PROCEDURE — 2580000003 HC RX 258: Performed by: FAMILY MEDICINE

## 2024-07-24 PROCEDURE — 6370000000 HC RX 637 (ALT 250 FOR IP): Performed by: INTERNAL MEDICINE

## 2024-07-24 PROCEDURE — 51798 US URINE CAPACITY MEASURE: CPT

## 2024-07-24 PROCEDURE — 1200000000 HC SEMI PRIVATE

## 2024-07-24 PROCEDURE — 97110 THERAPEUTIC EXERCISES: CPT | Performed by: PHYSICAL THERAPIST

## 2024-07-24 PROCEDURE — 51701 INSERT BLADDER CATHETER: CPT

## 2024-07-24 PROCEDURE — 97116 GAIT TRAINING THERAPY: CPT | Performed by: PHYSICAL THERAPIST

## 2024-07-24 PROCEDURE — 6370000000 HC RX 637 (ALT 250 FOR IP): Performed by: FAMILY MEDICINE

## 2024-07-24 RX ORDER — INSULIN LISPRO 100 [IU]/ML
6 INJECTION, SOLUTION INTRAVENOUS; SUBCUTANEOUS
Status: DISCONTINUED | OUTPATIENT
Start: 2024-07-24 | End: 2024-07-25

## 2024-07-24 RX ORDER — INSULIN GLARGINE 100 [IU]/ML
22 INJECTION, SOLUTION SUBCUTANEOUS EVERY EVENING
Status: DISCONTINUED | OUTPATIENT
Start: 2024-07-24 | End: 2024-08-09

## 2024-07-24 RX ADMIN — HEPARIN SODIUM 5000 UNITS: 5000 INJECTION INTRAVENOUS; SUBCUTANEOUS at 08:30

## 2024-07-24 RX ADMIN — HEPARIN SODIUM 5000 UNITS: 5000 INJECTION INTRAVENOUS; SUBCUTANEOUS at 21:12

## 2024-07-24 RX ADMIN — LOPERAMIDE HYDROCHLORIDE 2 MG: 2 CAPSULE ORAL at 08:30

## 2024-07-24 RX ADMIN — INSULIN GLARGINE 22 UNITS: 100 INJECTION, SOLUTION SUBCUTANEOUS at 21:12

## 2024-07-24 RX ADMIN — LIDOCAINE HYDROCHLORIDE: 20 JELLY TOPICAL at 22:32

## 2024-07-24 RX ADMIN — CEFAZOLIN 2000 MG: 10 INJECTION, POWDER, FOR SOLUTION INTRAVENOUS at 02:08

## 2024-07-24 RX ADMIN — SODIUM CHLORIDE, POTASSIUM CHLORIDE, SODIUM LACTATE AND CALCIUM CHLORIDE: 600; 310; 30; 20 INJECTION, SOLUTION INTRAVENOUS at 00:19

## 2024-07-24 RX ADMIN — INSULIN LISPRO 4 UNITS: 100 INJECTION, SOLUTION INTRAVENOUS; SUBCUTANEOUS at 12:39

## 2024-07-24 RX ADMIN — Medication 1 UNITS: at 12:39

## 2024-07-24 RX ADMIN — SODIUM CHLORIDE, POTASSIUM CHLORIDE, SODIUM LACTATE AND CALCIUM CHLORIDE: 600; 310; 30; 20 INJECTION, SOLUTION INTRAVENOUS at 11:04

## 2024-07-24 RX ADMIN — MIDODRINE HYDROCHLORIDE 5 MG: 5 TABLET ORAL at 11:02

## 2024-07-24 RX ADMIN — GABAPENTIN 100 MG: 100 CAPSULE ORAL at 08:30

## 2024-07-24 RX ADMIN — MIDODRINE HYDROCHLORIDE 5 MG: 5 TABLET ORAL at 18:13

## 2024-07-24 RX ADMIN — INSULIN LISPRO 6 UNITS: 100 INJECTION, SOLUTION INTRAVENOUS; SUBCUTANEOUS at 09:41

## 2024-07-24 RX ADMIN — Medication 2 UNITS: at 18:39

## 2024-07-24 RX ADMIN — GABAPENTIN 100 MG: 100 CAPSULE ORAL at 13:36

## 2024-07-24 RX ADMIN — SODIUM CHLORIDE, PRESERVATIVE FREE 10 ML: 5 INJECTION INTRAVENOUS at 21:17

## 2024-07-24 RX ADMIN — SODIUM CHLORIDE, POTASSIUM CHLORIDE, SODIUM LACTATE AND CALCIUM CHLORIDE: 600; 310; 30; 20 INJECTION, SOLUTION INTRAVENOUS at 21:14

## 2024-07-24 RX ADMIN — LIDOCAINE HYDROCHLORIDE: 20 JELLY TOPICAL at 14:39

## 2024-07-24 RX ADMIN — GABAPENTIN 100 MG: 100 CAPSULE ORAL at 21:12

## 2024-07-24 RX ADMIN — LIDOCAINE HYDROCHLORIDE: 20 JELLY TOPICAL at 06:19

## 2024-07-24 RX ADMIN — CEFAZOLIN 2000 MG: 10 INJECTION, POWDER, FOR SOLUTION INTRAVENOUS at 11:05

## 2024-07-24 RX ADMIN — Medication 1 CAPSULE: at 08:28

## 2024-07-24 RX ADMIN — CEFAZOLIN 2000 MG: 10 INJECTION, POWDER, FOR SOLUTION INTRAVENOUS at 18:14

## 2024-07-24 RX ADMIN — INSULIN LISPRO 6 UNITS: 100 INJECTION, SOLUTION INTRAVENOUS; SUBCUTANEOUS at 18:39

## 2024-07-24 RX ADMIN — Medication 3 UNITS: at 08:34

## 2024-07-24 ASSESSMENT — PAIN SCALES - GENERAL
PAINLEVEL_OUTOF10: 0
PAINLEVEL_OUTOF10: 3
PAINLEVEL_OUTOF10: 0

## 2024-07-25 LAB
ANION GAP SERPL CALC-SCNC: 7 MMOL/L (ref 5–15)
BUN SERPL-MCNC: 31 MG/DL (ref 6–20)
BUN/CREAT SERPL: 20 (ref 12–20)
CALCIUM SERPL-MCNC: 9.1 MG/DL (ref 8.5–10.1)
CHLORIDE SERPL-SCNC: 100 MMOL/L (ref 97–108)
CO2 SERPL-SCNC: 29 MMOL/L (ref 21–32)
CREAT SERPL-MCNC: 1.58 MG/DL (ref 0.7–1.3)
GLUCOSE BLD STRIP.AUTO-MCNC: 253 MG/DL (ref 65–117)
GLUCOSE BLD STRIP.AUTO-MCNC: 264 MG/DL (ref 65–117)
GLUCOSE BLD STRIP.AUTO-MCNC: 278 MG/DL (ref 65–117)
GLUCOSE BLD STRIP.AUTO-MCNC: 308 MG/DL (ref 65–117)
GLUCOSE SERPL-MCNC: 239 MG/DL (ref 65–100)
POTASSIUM SERPL-SCNC: 5.1 MMOL/L (ref 3.5–5.1)
SERVICE CMNT-IMP: ABNORMAL
SODIUM SERPL-SCNC: 136 MMOL/L (ref 136–145)

## 2024-07-25 PROCEDURE — 6360000002 HC RX W HCPCS: Performed by: INTERNAL MEDICINE

## 2024-07-25 PROCEDURE — 2580000003 HC RX 258: Performed by: INTERNAL MEDICINE

## 2024-07-25 PROCEDURE — 1200000000 HC SEMI PRIVATE

## 2024-07-25 PROCEDURE — 6370000000 HC RX 637 (ALT 250 FOR IP): Performed by: INTERNAL MEDICINE

## 2024-07-25 PROCEDURE — 6360000002 HC RX W HCPCS: Performed by: FAMILY MEDICINE

## 2024-07-25 PROCEDURE — 6370000000 HC RX 637 (ALT 250 FOR IP): Performed by: FAMILY MEDICINE

## 2024-07-25 PROCEDURE — 80048 BASIC METABOLIC PNL TOTAL CA: CPT

## 2024-07-25 PROCEDURE — 82962 GLUCOSE BLOOD TEST: CPT

## 2024-07-25 PROCEDURE — 36415 COLL VENOUS BLD VENIPUNCTURE: CPT

## 2024-07-25 RX ORDER — INSULIN LISPRO 100 [IU]/ML
8 INJECTION, SOLUTION INTRAVENOUS; SUBCUTANEOUS
Status: DISCONTINUED | OUTPATIENT
Start: 2024-07-25 | End: 2024-08-09

## 2024-07-25 RX ADMIN — CEFAZOLIN 2000 MG: 10 INJECTION, POWDER, FOR SOLUTION INTRAVENOUS at 08:38

## 2024-07-25 RX ADMIN — Medication 1 CAPSULE: at 08:32

## 2024-07-25 RX ADMIN — CEFAZOLIN 2000 MG: 10 INJECTION, POWDER, FOR SOLUTION INTRAVENOUS at 02:40

## 2024-07-25 RX ADMIN — Medication 3 UNITS: at 08:33

## 2024-07-25 RX ADMIN — GABAPENTIN 100 MG: 100 CAPSULE ORAL at 08:32

## 2024-07-25 RX ADMIN — Medication 2 UNITS: at 11:21

## 2024-07-25 RX ADMIN — LOPERAMIDE HYDROCHLORIDE 2 MG: 2 CAPSULE ORAL at 08:32

## 2024-07-25 RX ADMIN — INSULIN GLARGINE 22 UNITS: 100 INJECTION, SOLUTION SUBCUTANEOUS at 16:12

## 2024-07-25 RX ADMIN — HEPARIN SODIUM 5000 UNITS: 5000 INJECTION INTRAVENOUS; SUBCUTANEOUS at 22:10

## 2024-07-25 RX ADMIN — MIDODRINE HYDROCHLORIDE 5 MG: 5 TABLET ORAL at 16:12

## 2024-07-25 RX ADMIN — INSULIN LISPRO 6 UNITS: 100 INJECTION, SOLUTION INTRAVENOUS; SUBCUTANEOUS at 11:21

## 2024-07-25 RX ADMIN — Medication 2 UNITS: at 16:12

## 2024-07-25 RX ADMIN — Medication 8 UNITS: at 16:12

## 2024-07-25 RX ADMIN — HEPARIN SODIUM 5000 UNITS: 5000 INJECTION INTRAVENOUS; SUBCUTANEOUS at 08:32

## 2024-07-25 RX ADMIN — MIDODRINE HYDROCHLORIDE 5 MG: 5 TABLET ORAL at 08:32

## 2024-07-25 RX ADMIN — MIDODRINE HYDROCHLORIDE 5 MG: 5 TABLET ORAL at 11:21

## 2024-07-25 RX ADMIN — CEFAZOLIN 2000 MG: 10 INJECTION, POWDER, FOR SOLUTION INTRAVENOUS at 16:12

## 2024-07-25 RX ADMIN — INSULIN LISPRO 6 UNITS: 100 INJECTION, SOLUTION INTRAVENOUS; SUBCUTANEOUS at 08:32

## 2024-07-25 RX ADMIN — GABAPENTIN 100 MG: 100 CAPSULE ORAL at 14:02

## 2024-07-25 RX ADMIN — GABAPENTIN 100 MG: 100 CAPSULE ORAL at 22:09

## 2024-07-26 LAB
ANION GAP SERPL CALC-SCNC: 7 MMOL/L (ref 5–15)
BACTERIA SPEC CULT: NORMAL
BACTERIA SPEC CULT: NORMAL
BUN SERPL-MCNC: 31 MG/DL (ref 6–20)
BUN/CREAT SERPL: 17 (ref 12–20)
CALCIUM SERPL-MCNC: 8.3 MG/DL (ref 8.5–10.1)
CHLORIDE SERPL-SCNC: 101 MMOL/L (ref 97–108)
CO2 SERPL-SCNC: 30 MMOL/L (ref 21–32)
CREAT SERPL-MCNC: 1.84 MG/DL (ref 0.7–1.3)
GLUCOSE BLD STRIP.AUTO-MCNC: 111 MG/DL (ref 65–117)
GLUCOSE BLD STRIP.AUTO-MCNC: 166 MG/DL (ref 65–117)
GLUCOSE BLD STRIP.AUTO-MCNC: 172 MG/DL (ref 65–117)
GLUCOSE BLD STRIP.AUTO-MCNC: 205 MG/DL (ref 65–117)
GLUCOSE SERPL-MCNC: 209 MG/DL (ref 65–100)
POTASSIUM SERPL-SCNC: 4.9 MMOL/L (ref 3.5–5.1)
SERVICE CMNT-IMP: ABNORMAL
SERVICE CMNT-IMP: NORMAL
SODIUM SERPL-SCNC: 138 MMOL/L (ref 136–145)

## 2024-07-26 PROCEDURE — 2580000003 HC RX 258: Performed by: INTERNAL MEDICINE

## 2024-07-26 PROCEDURE — 6370000000 HC RX 637 (ALT 250 FOR IP): Performed by: FAMILY MEDICINE

## 2024-07-26 PROCEDURE — 6370000000 HC RX 637 (ALT 250 FOR IP): Performed by: INTERNAL MEDICINE

## 2024-07-26 PROCEDURE — 80048 BASIC METABOLIC PNL TOTAL CA: CPT

## 2024-07-26 PROCEDURE — 6360000002 HC RX W HCPCS: Performed by: FAMILY MEDICINE

## 2024-07-26 PROCEDURE — 6360000002 HC RX W HCPCS: Performed by: INTERNAL MEDICINE

## 2024-07-26 PROCEDURE — 97116 GAIT TRAINING THERAPY: CPT | Performed by: PHYSICAL THERAPIST

## 2024-07-26 PROCEDURE — 36415 COLL VENOUS BLD VENIPUNCTURE: CPT

## 2024-07-26 PROCEDURE — 82962 GLUCOSE BLOOD TEST: CPT

## 2024-07-26 PROCEDURE — 2580000003 HC RX 258: Performed by: FAMILY MEDICINE

## 2024-07-26 PROCEDURE — 1200000000 HC SEMI PRIVATE

## 2024-07-26 RX ADMIN — CEFAZOLIN 2000 MG: 10 INJECTION, POWDER, FOR SOLUTION INTRAVENOUS at 03:32

## 2024-07-26 RX ADMIN — SODIUM CHLORIDE, PRESERVATIVE FREE 10 ML: 5 INJECTION INTRAVENOUS at 21:16

## 2024-07-26 RX ADMIN — SODIUM CHLORIDE, PRESERVATIVE FREE 10 ML: 5 INJECTION INTRAVENOUS at 03:32

## 2024-07-26 RX ADMIN — MIDODRINE HYDROCHLORIDE 5 MG: 5 TABLET ORAL at 16:23

## 2024-07-26 RX ADMIN — CEFAZOLIN 2000 MG: 10 INJECTION, POWDER, FOR SOLUTION INTRAVENOUS at 09:16

## 2024-07-26 RX ADMIN — Medication 8 UNITS: at 11:47

## 2024-07-26 RX ADMIN — Medication 1 CAPSULE: at 09:16

## 2024-07-26 RX ADMIN — SODIUM CHLORIDE, PRESERVATIVE FREE 10 ML: 5 INJECTION INTRAVENOUS at 09:18

## 2024-07-26 RX ADMIN — Medication 1 UNITS: at 09:16

## 2024-07-26 RX ADMIN — INSULIN GLARGINE 22 UNITS: 100 INJECTION, SOLUTION SUBCUTANEOUS at 21:15

## 2024-07-26 RX ADMIN — MIDODRINE HYDROCHLORIDE 5 MG: 5 TABLET ORAL at 11:47

## 2024-07-26 RX ADMIN — MIDODRINE HYDROCHLORIDE 5 MG: 5 TABLET ORAL at 09:17

## 2024-07-26 RX ADMIN — SODIUM CHLORIDE, POTASSIUM CHLORIDE, SODIUM LACTATE AND CALCIUM CHLORIDE: 600; 310; 30; 20 INJECTION, SOLUTION INTRAVENOUS at 22:40

## 2024-07-26 RX ADMIN — GABAPENTIN 100 MG: 100 CAPSULE ORAL at 21:16

## 2024-07-26 RX ADMIN — CEFAZOLIN 2000 MG: 10 INJECTION, POWDER, FOR SOLUTION INTRAVENOUS at 17:54

## 2024-07-26 RX ADMIN — HEPARIN SODIUM 5000 UNITS: 5000 INJECTION INTRAVENOUS; SUBCUTANEOUS at 21:15

## 2024-07-26 RX ADMIN — Medication 8 UNITS: at 09:16

## 2024-07-26 RX ADMIN — HEPARIN SODIUM 5000 UNITS: 5000 INJECTION INTRAVENOUS; SUBCUTANEOUS at 09:17

## 2024-07-26 RX ADMIN — GABAPENTIN 100 MG: 100 CAPSULE ORAL at 09:17

## 2024-07-26 RX ADMIN — Medication 8 UNITS: at 16:24

## 2024-07-26 RX ADMIN — GABAPENTIN 100 MG: 100 CAPSULE ORAL at 13:35

## 2024-07-26 ASSESSMENT — PAIN SCALES - GENERAL: PAINLEVEL_OUTOF10: 0

## 2024-07-27 LAB
GLUCOSE BLD STRIP.AUTO-MCNC: 153 MG/DL (ref 65–117)
GLUCOSE BLD STRIP.AUTO-MCNC: 155 MG/DL (ref 65–117)
GLUCOSE BLD STRIP.AUTO-MCNC: 266 MG/DL (ref 65–117)
GLUCOSE BLD STRIP.AUTO-MCNC: 270 MG/DL (ref 65–117)
SERVICE CMNT-IMP: ABNORMAL

## 2024-07-27 PROCEDURE — 6360000002 HC RX W HCPCS: Performed by: INTERNAL MEDICINE

## 2024-07-27 PROCEDURE — 6360000002 HC RX W HCPCS: Performed by: FAMILY MEDICINE

## 2024-07-27 PROCEDURE — 2580000003 HC RX 258: Performed by: INTERNAL MEDICINE

## 2024-07-27 PROCEDURE — 82962 GLUCOSE BLOOD TEST: CPT

## 2024-07-27 PROCEDURE — 1200000000 HC SEMI PRIVATE

## 2024-07-27 PROCEDURE — 6370000000 HC RX 637 (ALT 250 FOR IP): Performed by: FAMILY MEDICINE

## 2024-07-27 PROCEDURE — 2580000003 HC RX 258: Performed by: FAMILY MEDICINE

## 2024-07-27 PROCEDURE — 6370000000 HC RX 637 (ALT 250 FOR IP): Performed by: INTERNAL MEDICINE

## 2024-07-27 RX ORDER — LIDOCAINE HYDROCHLORIDE 20 MG/ML
JELLY TOPICAL 4 TIMES DAILY
Status: DISCONTINUED | OUTPATIENT
Start: 2024-07-27 | End: 2024-07-28

## 2024-07-27 RX ADMIN — Medication 8 UNITS: at 17:25

## 2024-07-27 RX ADMIN — CEFAZOLIN 2000 MG: 10 INJECTION, POWDER, FOR SOLUTION INTRAVENOUS at 17:25

## 2024-07-27 RX ADMIN — Medication 8 UNITS: at 11:27

## 2024-07-27 RX ADMIN — Medication 2 UNITS: at 17:26

## 2024-07-27 RX ADMIN — HEPARIN SODIUM 5000 UNITS: 5000 INJECTION INTRAVENOUS; SUBCUTANEOUS at 21:35

## 2024-07-27 RX ADMIN — MIDODRINE HYDROCHLORIDE 5 MG: 5 TABLET ORAL at 11:27

## 2024-07-27 RX ADMIN — SODIUM CHLORIDE, PRESERVATIVE FREE 10 ML: 5 INJECTION INTRAVENOUS at 08:19

## 2024-07-27 RX ADMIN — SODIUM CHLORIDE, PRESERVATIVE FREE 10 ML: 5 INJECTION INTRAVENOUS at 21:36

## 2024-07-27 RX ADMIN — MICONAZOLE NITRATE: 20 CREAM TOPICAL at 12:26

## 2024-07-27 RX ADMIN — MIDODRINE HYDROCHLORIDE 5 MG: 5 TABLET ORAL at 17:26

## 2024-07-27 RX ADMIN — GABAPENTIN 100 MG: 100 CAPSULE ORAL at 21:35

## 2024-07-27 RX ADMIN — Medication: at 21:36

## 2024-07-27 RX ADMIN — INSULIN GLARGINE 22 UNITS: 100 INJECTION, SOLUTION SUBCUTANEOUS at 21:35

## 2024-07-27 RX ADMIN — GABAPENTIN 100 MG: 100 CAPSULE ORAL at 08:20

## 2024-07-27 RX ADMIN — MIDODRINE HYDROCHLORIDE 5 MG: 5 TABLET ORAL at 08:18

## 2024-07-27 RX ADMIN — CEFAZOLIN 2000 MG: 10 INJECTION, POWDER, FOR SOLUTION INTRAVENOUS at 02:26

## 2024-07-27 RX ADMIN — Medication 1 CAPSULE: at 08:19

## 2024-07-27 RX ADMIN — HEPARIN SODIUM 5000 UNITS: 5000 INJECTION INTRAVENOUS; SUBCUTANEOUS at 08:18

## 2024-07-27 RX ADMIN — GABAPENTIN 100 MG: 100 CAPSULE ORAL at 13:36

## 2024-07-27 RX ADMIN — Medication 8 UNITS: at 08:18

## 2024-07-27 RX ADMIN — CEFAZOLIN 2000 MG: 10 INJECTION, POWDER, FOR SOLUTION INTRAVENOUS at 09:42

## 2024-07-27 ASSESSMENT — PAIN SCALES - GENERAL
PAINLEVEL_OUTOF10: 0
PAINLEVEL_OUTOF10: 0

## 2024-07-28 LAB
BASOPHILS # BLD: 0.1 K/UL (ref 0–0.1)
BASOPHILS NFR BLD: 1 % (ref 0–1)
EOSINOPHIL # BLD: 0.2 K/UL (ref 0–0.4)
EOSINOPHIL NFR BLD: 2 % (ref 0–7)
ERYTHROCYTE [DISTWIDTH] IN BLOOD BY AUTOMATED COUNT: 13.5 % (ref 11.5–14.5)
GLUCOSE BLD STRIP.AUTO-MCNC: 148 MG/DL (ref 65–117)
GLUCOSE BLD STRIP.AUTO-MCNC: 177 MG/DL (ref 65–117)
GLUCOSE BLD STRIP.AUTO-MCNC: 243 MG/DL (ref 65–117)
GLUCOSE BLD STRIP.AUTO-MCNC: 330 MG/DL (ref 65–117)
HGB BLD-MCNC: 7.8 G/DL (ref 12.1–17)
IMM GRANULOCYTES # BLD AUTO: 0.1 K/UL (ref 0–0.04)
IMM GRANULOCYTES NFR BLD AUTO: 1 % (ref 0–0.5)
LYMPHOCYTES # BLD: 1.4 K/UL (ref 0.8–3.5)
LYMPHOCYTES NFR BLD: 15 % (ref 12–49)
MCH RBC QN AUTO: 31.1 PG (ref 26–34)
MCHC RBC AUTO-ENTMCNC: 31.2 G/DL (ref 30–36.5)
MCV RBC AUTO: 99.6 FL (ref 80–99)
MONOCYTES # BLD: 0.6 K/UL (ref 0–1)
MONOCYTES NFR BLD: 6 % (ref 5–13)
NEUTS SEG # BLD: 6.9 K/UL (ref 1.8–8)
NEUTS SEG NFR BLD: 75 % (ref 32–75)
NRBC # BLD: 0 K/UL (ref 0–0.01)
NRBC BLD-RTO: 0 PER 100 WBC
PLATELET # BLD AUTO: 265 K/UL (ref 150–400)
PMV BLD AUTO: 9.4 FL (ref 8.9–12.9)
RBC # BLD AUTO: 2.51 M/UL (ref 4.1–5.7)
SERVICE CMNT-IMP: ABNORMAL
WBC # BLD AUTO: 9.2 K/UL (ref 4.1–11.1)

## 2024-07-28 PROCEDURE — 85025 COMPLETE CBC W/AUTO DIFF WBC: CPT

## 2024-07-28 PROCEDURE — 2580000003 HC RX 258: Performed by: FAMILY MEDICINE

## 2024-07-28 PROCEDURE — 36592 COLLECT BLOOD FROM PICC: CPT

## 2024-07-28 PROCEDURE — 6370000000 HC RX 637 (ALT 250 FOR IP): Performed by: INTERNAL MEDICINE

## 2024-07-28 PROCEDURE — 6370000000 HC RX 637 (ALT 250 FOR IP): Performed by: FAMILY MEDICINE

## 2024-07-28 PROCEDURE — 82962 GLUCOSE BLOOD TEST: CPT

## 2024-07-28 PROCEDURE — 36415 COLL VENOUS BLD VENIPUNCTURE: CPT

## 2024-07-28 PROCEDURE — 2580000003 HC RX 258: Performed by: INTERNAL MEDICINE

## 2024-07-28 PROCEDURE — 6360000002 HC RX W HCPCS: Performed by: INTERNAL MEDICINE

## 2024-07-28 PROCEDURE — 6360000002 HC RX W HCPCS: Performed by: FAMILY MEDICINE

## 2024-07-28 PROCEDURE — 1200000000 HC SEMI PRIVATE

## 2024-07-28 RX ORDER — LOPERAMIDE HYDROCHLORIDE 2 MG/1
2 CAPSULE ORAL 3 TIMES DAILY
Status: DISCONTINUED | OUTPATIENT
Start: 2024-07-28 | End: 2024-07-29

## 2024-07-28 RX ORDER — LOPERAMIDE HYDROCHLORIDE 2 MG/1
2 CAPSULE ORAL 3 TIMES DAILY
Status: DISCONTINUED | OUTPATIENT
Start: 2024-07-28 | End: 2024-07-28

## 2024-07-28 RX ORDER — LIDOCAINE HYDROCHLORIDE 20 MG/ML
JELLY TOPICAL EVERY 4 HOURS PRN
Status: DISCONTINUED | OUTPATIENT
Start: 2024-07-28 | End: 2024-08-10 | Stop reason: HOSPADM

## 2024-07-28 RX ADMIN — GABAPENTIN 100 MG: 100 CAPSULE ORAL at 13:18

## 2024-07-28 RX ADMIN — GABAPENTIN 100 MG: 100 CAPSULE ORAL at 07:49

## 2024-07-28 RX ADMIN — Medication 3 UNITS: at 07:49

## 2024-07-28 RX ADMIN — Medication: at 07:50

## 2024-07-28 RX ADMIN — MIDODRINE HYDROCHLORIDE 5 MG: 5 TABLET ORAL at 07:49

## 2024-07-28 RX ADMIN — INSULIN GLARGINE 22 UNITS: 100 INJECTION, SOLUTION SUBCUTANEOUS at 21:22

## 2024-07-28 RX ADMIN — HEPARIN SODIUM 5000 UNITS: 5000 INJECTION INTRAVENOUS; SUBCUTANEOUS at 07:49

## 2024-07-28 RX ADMIN — Medication 1 CAPSULE: at 07:49

## 2024-07-28 RX ADMIN — SODIUM CHLORIDE, POTASSIUM CHLORIDE, SODIUM LACTATE AND CALCIUM CHLORIDE: 600; 310; 30; 20 INJECTION, SOLUTION INTRAVENOUS at 21:35

## 2024-07-28 RX ADMIN — Medication: at 21:23

## 2024-07-28 RX ADMIN — Medication 8 UNITS: at 17:39

## 2024-07-28 RX ADMIN — MIDODRINE HYDROCHLORIDE 5 MG: 5 TABLET ORAL at 11:20

## 2024-07-28 RX ADMIN — Medication 8 UNITS: at 11:19

## 2024-07-28 RX ADMIN — CEFAZOLIN 2000 MG: 10 INJECTION, POWDER, FOR SOLUTION INTRAVENOUS at 18:38

## 2024-07-28 RX ADMIN — SODIUM CHLORIDE, PRESERVATIVE FREE 10 ML: 5 INJECTION INTRAVENOUS at 07:50

## 2024-07-28 RX ADMIN — GABAPENTIN 100 MG: 100 CAPSULE ORAL at 21:21

## 2024-07-28 RX ADMIN — Medication 1 UNITS: at 16:43

## 2024-07-28 RX ADMIN — CEFAZOLIN 2000 MG: 10 INJECTION, POWDER, FOR SOLUTION INTRAVENOUS at 09:23

## 2024-07-28 RX ADMIN — HEPARIN SODIUM 5000 UNITS: 5000 INJECTION INTRAVENOUS; SUBCUTANEOUS at 21:21

## 2024-07-28 RX ADMIN — Medication 8 UNITS: at 07:48

## 2024-07-28 RX ADMIN — SODIUM CHLORIDE, PRESERVATIVE FREE 10 ML: 5 INJECTION INTRAVENOUS at 21:23

## 2024-07-28 RX ADMIN — SODIUM CHLORIDE, POTASSIUM CHLORIDE, SODIUM LACTATE AND CALCIUM CHLORIDE: 600; 310; 30; 20 INJECTION, SOLUTION INTRAVENOUS at 02:40

## 2024-07-28 RX ADMIN — CEFAZOLIN 2000 MG: 10 INJECTION, POWDER, FOR SOLUTION INTRAVENOUS at 02:39

## 2024-07-28 ASSESSMENT — PAIN SCALES - GENERAL
PAINLEVEL_OUTOF10: 0
PAINLEVEL_OUTOF10: 0

## 2024-07-29 LAB
GLUCOSE BLD STRIP.AUTO-MCNC: 147 MG/DL (ref 65–117)
GLUCOSE BLD STRIP.AUTO-MCNC: 148 MG/DL (ref 65–117)
GLUCOSE BLD STRIP.AUTO-MCNC: 164 MG/DL (ref 65–117)
GLUCOSE BLD STRIP.AUTO-MCNC: 261 MG/DL (ref 65–117)
GLUCOSE BLD STRIP.AUTO-MCNC: 261 MG/DL (ref 65–117)
SERVICE CMNT-IMP: ABNORMAL

## 2024-07-29 PROCEDURE — 2580000003 HC RX 258: Performed by: INTERNAL MEDICINE

## 2024-07-29 PROCEDURE — 6370000000 HC RX 637 (ALT 250 FOR IP): Performed by: FAMILY MEDICINE

## 2024-07-29 PROCEDURE — 97116 GAIT TRAINING THERAPY: CPT

## 2024-07-29 PROCEDURE — 1200000000 HC SEMI PRIVATE

## 2024-07-29 PROCEDURE — 6360000002 HC RX W HCPCS: Performed by: INTERNAL MEDICINE

## 2024-07-29 PROCEDURE — 2580000003 HC RX 258: Performed by: FAMILY MEDICINE

## 2024-07-29 PROCEDURE — 6370000000 HC RX 637 (ALT 250 FOR IP): Performed by: INTERNAL MEDICINE

## 2024-07-29 PROCEDURE — 97110 THERAPEUTIC EXERCISES: CPT

## 2024-07-29 PROCEDURE — 6360000002 HC RX W HCPCS: Performed by: FAMILY MEDICINE

## 2024-07-29 PROCEDURE — 82962 GLUCOSE BLOOD TEST: CPT

## 2024-07-29 RX ORDER — LOPERAMIDE HYDROCHLORIDE 2 MG/1
2 CAPSULE ORAL ONCE
Status: CANCELLED | OUTPATIENT
Start: 2024-07-29

## 2024-07-29 RX ORDER — LOPERAMIDE HYDROCHLORIDE 2 MG/1
2 CAPSULE ORAL DAILY
Status: DISCONTINUED | OUTPATIENT
Start: 2024-07-30 | End: 2024-08-10 | Stop reason: HOSPADM

## 2024-07-29 RX ADMIN — CEFAZOLIN 2000 MG: 10 INJECTION, POWDER, FOR SOLUTION INTRAVENOUS at 18:25

## 2024-07-29 RX ADMIN — Medication 8 UNITS: at 18:23

## 2024-07-29 RX ADMIN — MIDODRINE HYDROCHLORIDE 5 MG: 5 TABLET ORAL at 16:58

## 2024-07-29 RX ADMIN — MIDODRINE HYDROCHLORIDE 5 MG: 5 TABLET ORAL at 12:12

## 2024-07-29 RX ADMIN — SODIUM CHLORIDE, POTASSIUM CHLORIDE, SODIUM LACTATE AND CALCIUM CHLORIDE: 600; 310; 30; 20 INJECTION, SOLUTION INTRAVENOUS at 06:31

## 2024-07-29 RX ADMIN — HEPARIN SODIUM 5000 UNITS: 5000 INJECTION INTRAVENOUS; SUBCUTANEOUS at 09:06

## 2024-07-29 RX ADMIN — SODIUM CHLORIDE, PRESERVATIVE FREE 10 ML: 5 INJECTION INTRAVENOUS at 09:13

## 2024-07-29 RX ADMIN — MIDODRINE HYDROCHLORIDE 5 MG: 5 TABLET ORAL at 09:05

## 2024-07-29 RX ADMIN — Medication 2 UNITS: at 18:23

## 2024-07-29 RX ADMIN — SODIUM CHLORIDE, PRESERVATIVE FREE 10 ML: 5 INJECTION INTRAVENOUS at 22:04

## 2024-07-29 RX ADMIN — Medication: at 22:04

## 2024-07-29 RX ADMIN — HEPARIN SODIUM 5000 UNITS: 5000 INJECTION INTRAVENOUS; SUBCUTANEOUS at 22:03

## 2024-07-29 RX ADMIN — CEFAZOLIN 2000 MG: 10 INJECTION, POWDER, FOR SOLUTION INTRAVENOUS at 09:13

## 2024-07-29 RX ADMIN — SODIUM CHLORIDE, POTASSIUM CHLORIDE, SODIUM LACTATE AND CALCIUM CHLORIDE: 600; 310; 30; 20 INJECTION, SOLUTION INTRAVENOUS at 18:36

## 2024-07-29 RX ADMIN — Medication 4 UNITS: at 09:04

## 2024-07-29 RX ADMIN — GABAPENTIN 100 MG: 100 CAPSULE ORAL at 09:05

## 2024-07-29 RX ADMIN — Medication: at 09:12

## 2024-07-29 RX ADMIN — Medication 4 UNITS: at 12:18

## 2024-07-29 RX ADMIN — INSULIN GLARGINE 22 UNITS: 100 INJECTION, SOLUTION SUBCUTANEOUS at 22:03

## 2024-07-29 RX ADMIN — CEFAZOLIN 2000 MG: 10 INJECTION, POWDER, FOR SOLUTION INTRAVENOUS at 02:55

## 2024-07-29 RX ADMIN — Medication 1 CAPSULE: at 09:06

## 2024-07-29 RX ADMIN — GABAPENTIN 100 MG: 100 CAPSULE ORAL at 22:02

## 2024-07-29 RX ADMIN — GABAPENTIN 100 MG: 100 CAPSULE ORAL at 15:44

## 2024-07-29 ASSESSMENT — PAIN SCALES - GENERAL: PAINLEVEL_OUTOF10: 0

## 2024-07-29 NOTE — CARE COORDINATION
Transition of Care Plan:    RUR: 14%  Prior Level of Functioning: Independent   Disposition: Home with outpatient services  If SNF or IPR: Date FOC offered: N/A  Follow up appointments: PCP and Endocrinology  DME needed: None  Transportation at discharge: Family (CM to clarify)  IM/IMM Medicare/ letter given: N/A - patient does not have Medicare   Is patient a  and connected with VA? N/A   If yes, was [facility] transfer form completed and VA notified? N/A  Caregiver Contact: Self or Sister, Melyssa Birch, [phone]  Discharge Caregiver contacted prior to discharge? CM to contact if needed/requested by pt  Care Conference needed? Not at this time  Barriers to discharge:  Medical clearance, IV ABX through 8/10/24    CM scheduled pt's hospital follow up appt with his PCP. CM added follow up appts to pt's AVS as below:           Follow up with Josias COLLADO MD  Tuesday Aug 13, 2024  Specialty: [facility] follow up appointment scheduled for 8/13 at 9:00 AM with Dr. Hale. Please arrive 15-30 min early and bring ID and list of current medications. Please call at least 48 hours in advance in order to change or cancel this appointment. [address] [place], [place] [id number] [phone]          Follow up with Vandana Monk MD  Monday Nov 11, 2024  Endocrinology appointment scheduled for 11/11 at 1:40 PM with Dr. Monk. Please arrive 15-30 min early and bring ID and list of current medications. Please call at least 48 hours in advance in order to change or cancel this appointment. [facility]  [address] [id number] [phone]     Case management continuing to follow for discharge planning.     Kalpana Andre LMSW,   [phone]

## 2024-07-30 LAB
GLUCOSE BLD STRIP.AUTO-MCNC: 103 MG/DL (ref 65–117)
GLUCOSE BLD STRIP.AUTO-MCNC: 174 MG/DL (ref 65–117)
GLUCOSE BLD STRIP.AUTO-MCNC: 320 MG/DL (ref 65–117)
GLUCOSE BLD STRIP.AUTO-MCNC: 327 MG/DL (ref 65–117)
SERVICE CMNT-IMP: ABNORMAL
SERVICE CMNT-IMP: NORMAL

## 2024-07-30 PROCEDURE — 97116 GAIT TRAINING THERAPY: CPT | Performed by: PHYSICAL THERAPIST

## 2024-07-30 PROCEDURE — 97530 THERAPEUTIC ACTIVITIES: CPT | Performed by: PHYSICAL THERAPIST

## 2024-07-30 PROCEDURE — 2580000003 HC RX 258: Performed by: INTERNAL MEDICINE

## 2024-07-30 PROCEDURE — 6370000000 HC RX 637 (ALT 250 FOR IP): Performed by: FAMILY MEDICINE

## 2024-07-30 PROCEDURE — 6370000000 HC RX 637 (ALT 250 FOR IP): Performed by: INTERNAL MEDICINE

## 2024-07-30 PROCEDURE — 6360000002 HC RX W HCPCS: Performed by: INTERNAL MEDICINE

## 2024-07-30 PROCEDURE — 6360000002 HC RX W HCPCS: Performed by: FAMILY MEDICINE

## 2024-07-30 PROCEDURE — 1200000000 HC SEMI PRIVATE

## 2024-07-30 PROCEDURE — 82962 GLUCOSE BLOOD TEST: CPT

## 2024-07-30 PROCEDURE — 2580000003 HC RX 258: Performed by: FAMILY MEDICINE

## 2024-07-30 RX ADMIN — SODIUM CHLORIDE, PRESERVATIVE FREE 10 ML: 5 INJECTION INTRAVENOUS at 10:28

## 2024-07-30 RX ADMIN — Medication: at 08:48

## 2024-07-30 RX ADMIN — GABAPENTIN 100 MG: 100 CAPSULE ORAL at 14:32

## 2024-07-30 RX ADMIN — INSULIN GLARGINE 22 UNITS: 100 INJECTION, SOLUTION SUBCUTANEOUS at 21:34

## 2024-07-30 RX ADMIN — Medication 8 UNITS: at 18:00

## 2024-07-30 RX ADMIN — HEPARIN SODIUM 5000 UNITS: 5000 INJECTION INTRAVENOUS; SUBCUTANEOUS at 08:48

## 2024-07-30 RX ADMIN — SODIUM CHLORIDE, POTASSIUM CHLORIDE, SODIUM LACTATE AND CALCIUM CHLORIDE: 600; 310; 30; 20 INJECTION, SOLUTION INTRAVENOUS at 16:53

## 2024-07-30 RX ADMIN — Medication: at 21:36

## 2024-07-30 RX ADMIN — MIDODRINE HYDROCHLORIDE 5 MG: 5 TABLET ORAL at 08:47

## 2024-07-30 RX ADMIN — MIDODRINE HYDROCHLORIDE 5 MG: 5 TABLET ORAL at 18:01

## 2024-07-30 RX ADMIN — SODIUM CHLORIDE, PRESERVATIVE FREE 10 ML: 5 INJECTION INTRAVENOUS at 21:35

## 2024-07-30 RX ADMIN — Medication 1 CAPSULE: at 08:48

## 2024-07-30 RX ADMIN — SODIUM CHLORIDE, PRESERVATIVE FREE 10 ML: 5 INJECTION INTRAVENOUS at 08:49

## 2024-07-30 RX ADMIN — CEFAZOLIN 2000 MG: 10 INJECTION, POWDER, FOR SOLUTION INTRAVENOUS at 02:18

## 2024-07-30 RX ADMIN — MIDODRINE HYDROCHLORIDE 5 MG: 5 TABLET ORAL at 12:11

## 2024-07-30 RX ADMIN — CEFAZOLIN 2000 MG: 10 INJECTION, POWDER, FOR SOLUTION INTRAVENOUS at 10:29

## 2024-07-30 RX ADMIN — Medication 8 UNITS: at 12:13

## 2024-07-30 RX ADMIN — LOPERAMIDE HYDROCHLORIDE 2 MG: 2 CAPSULE ORAL at 08:48

## 2024-07-30 RX ADMIN — Medication 3 UNITS: at 12:12

## 2024-07-30 RX ADMIN — GABAPENTIN 100 MG: 100 CAPSULE ORAL at 08:48

## 2024-07-30 RX ADMIN — GABAPENTIN 100 MG: 100 CAPSULE ORAL at 21:35

## 2024-07-30 RX ADMIN — SODIUM CHLORIDE, POTASSIUM CHLORIDE, SODIUM LACTATE AND CALCIUM CHLORIDE: 600; 310; 30; 20 INJECTION, SOLUTION INTRAVENOUS at 04:50

## 2024-07-30 RX ADMIN — Medication 3 UNITS: at 17:59

## 2024-07-30 RX ADMIN — SODIUM CHLORIDE, POTASSIUM CHLORIDE, SODIUM LACTATE AND CALCIUM CHLORIDE: 600; 310; 30; 20 INJECTION, SOLUTION INTRAVENOUS at 12:11

## 2024-07-30 RX ADMIN — CEFAZOLIN 2000 MG: 10 INJECTION, POWDER, FOR SOLUTION INTRAVENOUS at 18:02

## 2024-07-30 RX ADMIN — HEPARIN SODIUM 5000 UNITS: 5000 INJECTION INTRAVENOUS; SUBCUTANEOUS at 21:34

## 2024-07-30 ASSESSMENT — PAIN SCALES - GENERAL: PAINLEVEL_OUTOF10: 0

## 2024-07-31 LAB
ANION GAP SERPL CALC-SCNC: 7 MMOL/L (ref 5–15)
BUN SERPL-MCNC: 31 MG/DL (ref 6–20)
BUN/CREAT SERPL: 21 (ref 12–20)
CALCIUM SERPL-MCNC: 8.9 MG/DL (ref 8.5–10.1)
CHLORIDE SERPL-SCNC: 103 MMOL/L (ref 97–108)
CO2 SERPL-SCNC: 29 MMOL/L (ref 21–32)
CREAT SERPL-MCNC: 1.47 MG/DL (ref 0.7–1.3)
GLUCOSE BLD STRIP.AUTO-MCNC: 202 MG/DL (ref 65–117)
GLUCOSE BLD STRIP.AUTO-MCNC: 381 MG/DL (ref 65–117)
GLUCOSE BLD STRIP.AUTO-MCNC: 86 MG/DL (ref 65–117)
GLUCOSE SERPL-MCNC: 226 MG/DL (ref 65–100)
POTASSIUM SERPL-SCNC: 4.9 MMOL/L (ref 3.5–5.1)
SERVICE CMNT-IMP: ABNORMAL
SERVICE CMNT-IMP: ABNORMAL
SERVICE CMNT-IMP: NORMAL
SODIUM SERPL-SCNC: 139 MMOL/L (ref 136–145)

## 2024-07-31 PROCEDURE — 87506 IADNA-DNA/RNA PROBE TQ 6-11: CPT

## 2024-07-31 PROCEDURE — 6360000002 HC RX W HCPCS: Performed by: FAMILY MEDICINE

## 2024-07-31 PROCEDURE — 1200000000 HC SEMI PRIVATE

## 2024-07-31 PROCEDURE — 80048 BASIC METABOLIC PNL TOTAL CA: CPT

## 2024-07-31 PROCEDURE — 6370000000 HC RX 637 (ALT 250 FOR IP): Performed by: INTERNAL MEDICINE

## 2024-07-31 PROCEDURE — 89055 LEUKOCYTE ASSESSMENT FECAL: CPT

## 2024-07-31 PROCEDURE — 82962 GLUCOSE BLOOD TEST: CPT

## 2024-07-31 PROCEDURE — 87209 SMEAR COMPLEX STAIN: CPT

## 2024-07-31 PROCEDURE — 87177 OVA AND PARASITES SMEARS: CPT

## 2024-07-31 PROCEDURE — 6360000002 HC RX W HCPCS: Performed by: INTERNAL MEDICINE

## 2024-07-31 PROCEDURE — 2580000003 HC RX 258: Performed by: INTERNAL MEDICINE

## 2024-07-31 PROCEDURE — 6370000000 HC RX 637 (ALT 250 FOR IP): Performed by: FAMILY MEDICINE

## 2024-07-31 PROCEDURE — 82653 EL-1 FECAL QUANTITATIVE: CPT

## 2024-07-31 PROCEDURE — 36415 COLL VENOUS BLD VENIPUNCTURE: CPT

## 2024-07-31 PROCEDURE — 2580000003 HC RX 258: Performed by: FAMILY MEDICINE

## 2024-07-31 RX ADMIN — SODIUM CHLORIDE, POTASSIUM CHLORIDE, SODIUM LACTATE AND CALCIUM CHLORIDE: 600; 310; 30; 20 INJECTION, SOLUTION INTRAVENOUS at 12:26

## 2024-07-31 RX ADMIN — Medication 8 UNITS: at 08:09

## 2024-07-31 RX ADMIN — SODIUM CHLORIDE, PRESERVATIVE FREE 10 ML: 5 INJECTION INTRAVENOUS at 08:09

## 2024-07-31 RX ADMIN — Medication: at 08:09

## 2024-07-31 RX ADMIN — Medication 1 CAPSULE: at 08:05

## 2024-07-31 RX ADMIN — CEFAZOLIN 2000 MG: 10 INJECTION, POWDER, FOR SOLUTION INTRAVENOUS at 02:35

## 2024-07-31 RX ADMIN — Medication: at 21:11

## 2024-07-31 RX ADMIN — CEFAZOLIN 2000 MG: 10 INJECTION, POWDER, FOR SOLUTION INTRAVENOUS at 10:33

## 2024-07-31 RX ADMIN — GABAPENTIN 100 MG: 100 CAPSULE ORAL at 13:50

## 2024-07-31 RX ADMIN — GABAPENTIN 100 MG: 100 CAPSULE ORAL at 08:05

## 2024-07-31 RX ADMIN — MIDODRINE HYDROCHLORIDE 5 MG: 5 TABLET ORAL at 08:05

## 2024-07-31 RX ADMIN — Medication 1 UNITS: at 08:11

## 2024-07-31 RX ADMIN — SODIUM CHLORIDE, POTASSIUM CHLORIDE, SODIUM LACTATE AND CALCIUM CHLORIDE: 600; 310; 30; 20 INJECTION, SOLUTION INTRAVENOUS at 02:40

## 2024-07-31 RX ADMIN — LOPERAMIDE HYDROCHLORIDE 2 MG: 2 CAPSULE ORAL at 08:05

## 2024-07-31 RX ADMIN — Medication 8 UNITS: at 17:53

## 2024-07-31 RX ADMIN — MIDODRINE HYDROCHLORIDE 5 MG: 5 TABLET ORAL at 17:53

## 2024-07-31 RX ADMIN — GABAPENTIN 100 MG: 100 CAPSULE ORAL at 21:09

## 2024-07-31 RX ADMIN — SODIUM CHLORIDE, POTASSIUM CHLORIDE, SODIUM LACTATE AND CALCIUM CHLORIDE: 600; 310; 30; 20 INJECTION, SOLUTION INTRAVENOUS at 21:22

## 2024-07-31 RX ADMIN — INSULIN GLARGINE 22 UNITS: 100 INJECTION, SOLUTION SUBCUTANEOUS at 21:10

## 2024-07-31 RX ADMIN — HEPARIN SODIUM 5000 UNITS: 5000 INJECTION INTRAVENOUS; SUBCUTANEOUS at 21:10

## 2024-07-31 RX ADMIN — MIDODRINE HYDROCHLORIDE 5 MG: 5 TABLET ORAL at 11:56

## 2024-07-31 RX ADMIN — Medication 4 UNITS: at 17:54

## 2024-07-31 RX ADMIN — HEPARIN SODIUM 5000 UNITS: 5000 INJECTION INTRAVENOUS; SUBCUTANEOUS at 08:08

## 2024-07-31 RX ADMIN — CEFAZOLIN 2000 MG: 10 INJECTION, POWDER, FOR SOLUTION INTRAVENOUS at 17:54

## 2024-07-31 ASSESSMENT — PAIN SCALES - GENERAL: PAINLEVEL_OUTOF10: 0

## 2024-08-01 LAB
C COLI+JEJUNI TUF STL QL NAA+PROBE: NEGATIVE
EC STX1+STX2 GENES STL QL NAA+PROBE: NEGATIVE
ETEC ELTA+ESTB GENES STL QL NAA+PROBE: NEGATIVE
GLUCOSE BLD STRIP.AUTO-MCNC: 110 MG/DL (ref 65–117)
GLUCOSE BLD STRIP.AUTO-MCNC: 195 MG/DL (ref 65–117)
GLUCOSE BLD STRIP.AUTO-MCNC: 207 MG/DL (ref 65–117)
GLUCOSE BLD STRIP.AUTO-MCNC: 229 MG/DL (ref 65–117)
GLUCOSE BLD STRIP.AUTO-MCNC: 235 MG/DL (ref 65–117)
P SHIGELLOIDES DNA STL QL NAA+PROBE: NEGATIVE
SALMONELLA SP SPAO STL QL NAA+PROBE: NEGATIVE
SERVICE CMNT-IMP: ABNORMAL
SERVICE CMNT-IMP: NORMAL
SHIGELLA SP+EIEC IPAH STL QL NAA+PROBE: NEGATIVE
V CHOL+PARA+VUL DNA STL QL NAA+NON-PROBE: NEGATIVE
WBC #/AREA STL HPF: NORMAL /HPF (ref 0–4)
Y ENTEROCOL DNA STL QL NAA+NON-PROBE: NEGATIVE

## 2024-08-01 PROCEDURE — 6370000000 HC RX 637 (ALT 250 FOR IP): Performed by: FAMILY MEDICINE

## 2024-08-01 PROCEDURE — 2580000003 HC RX 258: Performed by: FAMILY MEDICINE

## 2024-08-01 PROCEDURE — 6360000002 HC RX W HCPCS: Performed by: FAMILY MEDICINE

## 2024-08-01 PROCEDURE — 97116 GAIT TRAINING THERAPY: CPT | Performed by: PHYSICAL THERAPIST

## 2024-08-01 PROCEDURE — 6360000002 HC RX W HCPCS: Performed by: INTERNAL MEDICINE

## 2024-08-01 PROCEDURE — 82962 GLUCOSE BLOOD TEST: CPT

## 2024-08-01 PROCEDURE — 6370000000 HC RX 637 (ALT 250 FOR IP): Performed by: INTERNAL MEDICINE

## 2024-08-01 PROCEDURE — 2580000003 HC RX 258: Performed by: INTERNAL MEDICINE

## 2024-08-01 PROCEDURE — 1200000000 HC SEMI PRIVATE

## 2024-08-01 RX ADMIN — HEPARIN SODIUM 5000 UNITS: 5000 INJECTION INTRAVENOUS; SUBCUTANEOUS at 08:50

## 2024-08-01 RX ADMIN — SODIUM CHLORIDE, POTASSIUM CHLORIDE, SODIUM LACTATE AND CALCIUM CHLORIDE: 600; 310; 30; 20 INJECTION, SOLUTION INTRAVENOUS at 20:27

## 2024-08-01 RX ADMIN — Medication: at 08:51

## 2024-08-01 RX ADMIN — Medication 1 UNITS: at 17:47

## 2024-08-01 RX ADMIN — CEFAZOLIN 2000 MG: 10 INJECTION, POWDER, FOR SOLUTION INTRAVENOUS at 01:58

## 2024-08-01 RX ADMIN — Medication 8 UNITS: at 11:53

## 2024-08-01 RX ADMIN — CEFAZOLIN 2000 MG: 10 INJECTION, POWDER, FOR SOLUTION INTRAVENOUS at 18:15

## 2024-08-01 RX ADMIN — MIDODRINE HYDROCHLORIDE 5 MG: 5 TABLET ORAL at 11:53

## 2024-08-01 RX ADMIN — SODIUM CHLORIDE, PRESERVATIVE FREE 10 ML: 5 INJECTION INTRAVENOUS at 08:51

## 2024-08-01 RX ADMIN — Medication 1 UNITS: at 11:54

## 2024-08-01 RX ADMIN — HEPARIN SODIUM 5000 UNITS: 5000 INJECTION INTRAVENOUS; SUBCUTANEOUS at 20:22

## 2024-08-01 RX ADMIN — INSULIN GLARGINE 22 UNITS: 100 INJECTION, SOLUTION SUBCUTANEOUS at 20:21

## 2024-08-01 RX ADMIN — CEFAZOLIN 2000 MG: 10 INJECTION, POWDER, FOR SOLUTION INTRAVENOUS at 10:31

## 2024-08-01 RX ADMIN — Medication 1 CAPSULE: at 08:50

## 2024-08-01 RX ADMIN — GABAPENTIN 100 MG: 100 CAPSULE ORAL at 13:45

## 2024-08-01 RX ADMIN — MIDODRINE HYDROCHLORIDE 5 MG: 5 TABLET ORAL at 17:47

## 2024-08-01 RX ADMIN — LOPERAMIDE HYDROCHLORIDE 2 MG: 2 CAPSULE ORAL at 08:50

## 2024-08-01 RX ADMIN — SODIUM CHLORIDE, POTASSIUM CHLORIDE, SODIUM LACTATE AND CALCIUM CHLORIDE: 600; 310; 30; 20 INJECTION, SOLUTION INTRAVENOUS at 06:13

## 2024-08-01 RX ADMIN — GABAPENTIN 100 MG: 100 CAPSULE ORAL at 20:21

## 2024-08-01 RX ADMIN — Medication: at 20:22

## 2024-08-01 RX ADMIN — MIDODRINE HYDROCHLORIDE 5 MG: 5 TABLET ORAL at 08:50

## 2024-08-01 RX ADMIN — GABAPENTIN 100 MG: 100 CAPSULE ORAL at 08:50

## 2024-08-01 RX ADMIN — SODIUM CHLORIDE, PRESERVATIVE FREE 10 ML: 5 INJECTION INTRAVENOUS at 20:28

## 2024-08-01 RX ADMIN — Medication 8 UNITS: at 17:47

## 2024-08-01 ASSESSMENT — PAIN SCALES - GENERAL
PAINLEVEL_OUTOF10: 0

## 2024-08-02 LAB
GLUCOSE BLD STRIP.AUTO-MCNC: 137 MG/DL (ref 65–117)
GLUCOSE BLD STRIP.AUTO-MCNC: 165 MG/DL (ref 65–117)
GLUCOSE BLD STRIP.AUTO-MCNC: 205 MG/DL (ref 65–117)
GLUCOSE BLD STRIP.AUTO-MCNC: 207 MG/DL (ref 65–117)
SERVICE CMNT-IMP: ABNORMAL

## 2024-08-02 PROCEDURE — 82962 GLUCOSE BLOOD TEST: CPT

## 2024-08-02 PROCEDURE — 6370000000 HC RX 637 (ALT 250 FOR IP): Performed by: INTERNAL MEDICINE

## 2024-08-02 PROCEDURE — 6370000000 HC RX 637 (ALT 250 FOR IP): Performed by: FAMILY MEDICINE

## 2024-08-02 PROCEDURE — 6360000002 HC RX W HCPCS: Performed by: FAMILY MEDICINE

## 2024-08-02 PROCEDURE — 2580000003 HC RX 258: Performed by: INTERNAL MEDICINE

## 2024-08-02 PROCEDURE — 1200000000 HC SEMI PRIVATE

## 2024-08-02 PROCEDURE — 6360000002 HC RX W HCPCS: Performed by: INTERNAL MEDICINE

## 2024-08-02 PROCEDURE — 2580000003 HC RX 258: Performed by: FAMILY MEDICINE

## 2024-08-02 RX ADMIN — INSULIN GLARGINE 22 UNITS: 100 INJECTION, SOLUTION SUBCUTANEOUS at 20:32

## 2024-08-02 RX ADMIN — Medication 1 UNITS: at 08:50

## 2024-08-02 RX ADMIN — Medication 1 UNITS: at 17:42

## 2024-08-02 RX ADMIN — GABAPENTIN 100 MG: 100 CAPSULE ORAL at 20:32

## 2024-08-02 RX ADMIN — MIDODRINE HYDROCHLORIDE 5 MG: 5 TABLET ORAL at 11:29

## 2024-08-02 RX ADMIN — HEPARIN SODIUM 5000 UNITS: 5000 INJECTION INTRAVENOUS; SUBCUTANEOUS at 20:32

## 2024-08-02 RX ADMIN — MIDODRINE HYDROCHLORIDE 5 MG: 5 TABLET ORAL at 17:48

## 2024-08-02 RX ADMIN — CEFAZOLIN 2000 MG: 10 INJECTION, POWDER, FOR SOLUTION INTRAVENOUS at 11:01

## 2024-08-02 RX ADMIN — SODIUM CHLORIDE, PRESERVATIVE FREE 10 ML: 5 INJECTION INTRAVENOUS at 20:33

## 2024-08-02 RX ADMIN — MIDODRINE HYDROCHLORIDE 5 MG: 5 TABLET ORAL at 08:49

## 2024-08-02 RX ADMIN — Medication 8 UNITS: at 08:49

## 2024-08-02 RX ADMIN — GABAPENTIN 100 MG: 100 CAPSULE ORAL at 13:07

## 2024-08-02 RX ADMIN — Medication 8 UNITS: at 17:42

## 2024-08-02 RX ADMIN — CEFAZOLIN 2000 MG: 10 INJECTION, POWDER, FOR SOLUTION INTRAVENOUS at 02:04

## 2024-08-02 RX ADMIN — SODIUM CHLORIDE, PRESERVATIVE FREE 10 ML: 5 INJECTION INTRAVENOUS at 08:50

## 2024-08-02 RX ADMIN — Medication: at 20:34

## 2024-08-02 RX ADMIN — LOPERAMIDE HYDROCHLORIDE 2 MG: 2 CAPSULE ORAL at 08:49

## 2024-08-02 RX ADMIN — HEPARIN SODIUM 5000 UNITS: 5000 INJECTION INTRAVENOUS; SUBCUTANEOUS at 08:49

## 2024-08-02 RX ADMIN — Medication: at 08:51

## 2024-08-02 RX ADMIN — CEFAZOLIN 2000 MG: 10 INJECTION, POWDER, FOR SOLUTION INTRAVENOUS at 17:42

## 2024-08-02 RX ADMIN — Medication 8 UNITS: at 11:29

## 2024-08-02 RX ADMIN — Medication 1 CAPSULE: at 08:49

## 2024-08-02 RX ADMIN — GABAPENTIN 100 MG: 100 CAPSULE ORAL at 08:49

## 2024-08-02 ASSESSMENT — PAIN SCALES - GENERAL
PAINLEVEL_OUTOF10: 0
PAINLEVEL_OUTOF10: 0

## 2024-08-03 LAB
GLUCOSE BLD STRIP.AUTO-MCNC: 119 MG/DL (ref 65–117)
GLUCOSE BLD STRIP.AUTO-MCNC: 157 MG/DL (ref 65–117)
GLUCOSE BLD STRIP.AUTO-MCNC: 159 MG/DL (ref 65–117)
GLUCOSE BLD STRIP.AUTO-MCNC: 206 MG/DL (ref 65–117)
GLUCOSE BLD STRIP.AUTO-MCNC: 88 MG/DL (ref 65–117)
SERVICE CMNT-IMP: ABNORMAL
SERVICE CMNT-IMP: NORMAL

## 2024-08-03 PROCEDURE — 2580000003 HC RX 258: Performed by: INTERNAL MEDICINE

## 2024-08-03 PROCEDURE — 82962 GLUCOSE BLOOD TEST: CPT

## 2024-08-03 PROCEDURE — 6360000002 HC RX W HCPCS: Performed by: FAMILY MEDICINE

## 2024-08-03 PROCEDURE — 2580000003 HC RX 258: Performed by: FAMILY MEDICINE

## 2024-08-03 PROCEDURE — 6360000002 HC RX W HCPCS: Performed by: INTERNAL MEDICINE

## 2024-08-03 PROCEDURE — 6370000000 HC RX 637 (ALT 250 FOR IP): Performed by: INTERNAL MEDICINE

## 2024-08-03 PROCEDURE — 6370000000 HC RX 637 (ALT 250 FOR IP): Performed by: FAMILY MEDICINE

## 2024-08-03 PROCEDURE — 1200000000 HC SEMI PRIVATE

## 2024-08-03 RX ADMIN — GABAPENTIN 100 MG: 100 CAPSULE ORAL at 20:33

## 2024-08-03 RX ADMIN — CEFAZOLIN 2000 MG: 10 INJECTION, POWDER, FOR SOLUTION INTRAVENOUS at 10:39

## 2024-08-03 RX ADMIN — CEFAZOLIN 2000 MG: 10 INJECTION, POWDER, FOR SOLUTION INTRAVENOUS at 18:15

## 2024-08-03 RX ADMIN — SODIUM CHLORIDE, PRESERVATIVE FREE 10 ML: 5 INJECTION INTRAVENOUS at 08:35

## 2024-08-03 RX ADMIN — Medication 1 CAPSULE: at 08:34

## 2024-08-03 RX ADMIN — GABAPENTIN 100 MG: 100 CAPSULE ORAL at 13:14

## 2024-08-03 RX ADMIN — HEPARIN SODIUM 5000 UNITS: 5000 INJECTION INTRAVENOUS; SUBCUTANEOUS at 08:34

## 2024-08-03 RX ADMIN — Medication 8 UNITS: at 18:15

## 2024-08-03 RX ADMIN — Medication: at 20:34

## 2024-08-03 RX ADMIN — Medication 1 UNITS: at 18:15

## 2024-08-03 RX ADMIN — MIDODRINE HYDROCHLORIDE 5 MG: 5 TABLET ORAL at 13:03

## 2024-08-03 RX ADMIN — SODIUM CHLORIDE, PRESERVATIVE FREE 10 ML: 5 INJECTION INTRAVENOUS at 20:33

## 2024-08-03 RX ADMIN — Medication 8 UNITS: at 08:35

## 2024-08-03 RX ADMIN — HEPARIN SODIUM 5000 UNITS: 5000 INJECTION INTRAVENOUS; SUBCUTANEOUS at 20:33

## 2024-08-03 RX ADMIN — MIDODRINE HYDROCHLORIDE 5 MG: 5 TABLET ORAL at 08:34

## 2024-08-03 RX ADMIN — Medication 8 UNITS: at 13:03

## 2024-08-03 RX ADMIN — MIDODRINE HYDROCHLORIDE 5 MG: 5 TABLET ORAL at 18:15

## 2024-08-03 RX ADMIN — Medication: at 08:36

## 2024-08-03 RX ADMIN — LOPERAMIDE HYDROCHLORIDE 2 MG: 2 CAPSULE ORAL at 08:34

## 2024-08-03 RX ADMIN — CEFAZOLIN 2000 MG: 10 INJECTION, POWDER, FOR SOLUTION INTRAVENOUS at 02:12

## 2024-08-03 RX ADMIN — GABAPENTIN 100 MG: 100 CAPSULE ORAL at 08:34

## 2024-08-03 RX ADMIN — INSULIN GLARGINE 22 UNITS: 100 INJECTION, SOLUTION SUBCUTANEOUS at 20:33

## 2024-08-03 RX ADMIN — SODIUM CHLORIDE, POTASSIUM CHLORIDE, SODIUM LACTATE AND CALCIUM CHLORIDE: 600; 310; 30; 20 INJECTION, SOLUTION INTRAVENOUS at 10:42

## 2024-08-03 ASSESSMENT — PAIN SCALES - GENERAL: PAINLEVEL_OUTOF10: 0

## 2024-08-04 LAB
ANION GAP SERPL CALC-SCNC: 8 MMOL/L (ref 5–15)
BUN SERPL-MCNC: 33 MG/DL (ref 6–20)
BUN/CREAT SERPL: 21 (ref 12–20)
CALCIUM SERPL-MCNC: 9 MG/DL (ref 8.5–10.1)
CHLORIDE SERPL-SCNC: 101 MMOL/L (ref 97–108)
CO2 SERPL-SCNC: 27 MMOL/L (ref 21–32)
CREAT SERPL-MCNC: 1.56 MG/DL (ref 0.7–1.3)
GLUCOSE BLD STRIP.AUTO-MCNC: 127 MG/DL (ref 65–117)
GLUCOSE BLD STRIP.AUTO-MCNC: 141 MG/DL (ref 65–117)
GLUCOSE BLD STRIP.AUTO-MCNC: 278 MG/DL (ref 65–117)
GLUCOSE BLD STRIP.AUTO-MCNC: 83 MG/DL (ref 65–117)
GLUCOSE SERPL-MCNC: 245 MG/DL (ref 65–100)
POTASSIUM SERPL-SCNC: 4.7 MMOL/L (ref 3.5–5.1)
SERVICE CMNT-IMP: ABNORMAL
SERVICE CMNT-IMP: NORMAL
SODIUM SERPL-SCNC: 136 MMOL/L (ref 136–145)

## 2024-08-04 PROCEDURE — 6360000002 HC RX W HCPCS: Performed by: INTERNAL MEDICINE

## 2024-08-04 PROCEDURE — 2580000003 HC RX 258: Performed by: FAMILY MEDICINE

## 2024-08-04 PROCEDURE — 6370000000 HC RX 637 (ALT 250 FOR IP): Performed by: FAMILY MEDICINE

## 2024-08-04 PROCEDURE — 6370000000 HC RX 637 (ALT 250 FOR IP): Performed by: INTERNAL MEDICINE

## 2024-08-04 PROCEDURE — 6360000002 HC RX W HCPCS: Performed by: FAMILY MEDICINE

## 2024-08-04 PROCEDURE — 2580000003 HC RX 258: Performed by: INTERNAL MEDICINE

## 2024-08-04 PROCEDURE — 80048 BASIC METABOLIC PNL TOTAL CA: CPT

## 2024-08-04 PROCEDURE — 1200000000 HC SEMI PRIVATE

## 2024-08-04 PROCEDURE — 36415 COLL VENOUS BLD VENIPUNCTURE: CPT

## 2024-08-04 PROCEDURE — 82962 GLUCOSE BLOOD TEST: CPT

## 2024-08-04 RX ADMIN — Medication 8 UNITS: at 12:28

## 2024-08-04 RX ADMIN — GABAPENTIN 100 MG: 100 CAPSULE ORAL at 08:59

## 2024-08-04 RX ADMIN — INSULIN GLARGINE 22 UNITS: 100 INJECTION, SOLUTION SUBCUTANEOUS at 22:04

## 2024-08-04 RX ADMIN — GABAPENTIN 100 MG: 100 CAPSULE ORAL at 14:43

## 2024-08-04 RX ADMIN — LOPERAMIDE HYDROCHLORIDE 2 MG: 2 CAPSULE ORAL at 08:59

## 2024-08-04 RX ADMIN — CEFAZOLIN 2000 MG: 10 INJECTION, POWDER, FOR SOLUTION INTRAVENOUS at 02:13

## 2024-08-04 RX ADMIN — SODIUM CHLORIDE, POTASSIUM CHLORIDE, SODIUM LACTATE AND CALCIUM CHLORIDE: 600; 310; 30; 20 INJECTION, SOLUTION INTRAVENOUS at 04:06

## 2024-08-04 RX ADMIN — SODIUM CHLORIDE, PRESERVATIVE FREE 10 ML: 5 INJECTION INTRAVENOUS at 09:01

## 2024-08-04 RX ADMIN — MIDODRINE HYDROCHLORIDE 5 MG: 5 TABLET ORAL at 17:27

## 2024-08-04 RX ADMIN — CEFAZOLIN 2000 MG: 10 INJECTION, POWDER, FOR SOLUTION INTRAVENOUS at 18:52

## 2024-08-04 RX ADMIN — CEFAZOLIN 2000 MG: 10 INJECTION, POWDER, FOR SOLUTION INTRAVENOUS at 11:04

## 2024-08-04 RX ADMIN — HEPARIN SODIUM 5000 UNITS: 5000 INJECTION INTRAVENOUS; SUBCUTANEOUS at 09:00

## 2024-08-04 RX ADMIN — GABAPENTIN 100 MG: 100 CAPSULE ORAL at 22:04

## 2024-08-04 RX ADMIN — Medication: at 09:01

## 2024-08-04 RX ADMIN — Medication 8 UNITS: at 17:27

## 2024-08-04 RX ADMIN — Medication 1 CAPSULE: at 08:59

## 2024-08-04 RX ADMIN — HEPARIN SODIUM 5000 UNITS: 5000 INJECTION INTRAVENOUS; SUBCUTANEOUS at 22:04

## 2024-08-04 RX ADMIN — Medication 2 UNITS: at 09:00

## 2024-08-04 RX ADMIN — Medication: at 22:06

## 2024-08-04 RX ADMIN — SODIUM CHLORIDE, PRESERVATIVE FREE 10 ML: 5 INJECTION INTRAVENOUS at 22:06

## 2024-08-04 RX ADMIN — Medication 8 UNITS: at 09:00

## 2024-08-04 RX ADMIN — MIDODRINE HYDROCHLORIDE 5 MG: 5 TABLET ORAL at 12:28

## 2024-08-04 RX ADMIN — MIDODRINE HYDROCHLORIDE 5 MG: 5 TABLET ORAL at 08:58

## 2024-08-04 ASSESSMENT — PAIN SCALES - GENERAL: PAINLEVEL_OUTOF10: 0

## 2024-08-05 LAB
ELASTASE PANC STL-MCNT: 27 UG ELAST./G
GLUCOSE BLD STRIP.AUTO-MCNC: 146 MG/DL (ref 65–117)
GLUCOSE BLD STRIP.AUTO-MCNC: 252 MG/DL (ref 65–117)
GLUCOSE BLD STRIP.AUTO-MCNC: 309 MG/DL (ref 65–117)
GLUCOSE BLD STRIP.AUTO-MCNC: 99 MG/DL (ref 65–117)
SERVICE CMNT-IMP: ABNORMAL
SERVICE CMNT-IMP: NORMAL

## 2024-08-05 PROCEDURE — 82962 GLUCOSE BLOOD TEST: CPT

## 2024-08-05 PROCEDURE — 2580000003 HC RX 258: Performed by: INTERNAL MEDICINE

## 2024-08-05 PROCEDURE — 1200000000 HC SEMI PRIVATE

## 2024-08-05 PROCEDURE — 6360000002 HC RX W HCPCS: Performed by: INTERNAL MEDICINE

## 2024-08-05 PROCEDURE — 6370000000 HC RX 637 (ALT 250 FOR IP): Performed by: INTERNAL MEDICINE

## 2024-08-05 PROCEDURE — 2580000003 HC RX 258: Performed by: FAMILY MEDICINE

## 2024-08-05 PROCEDURE — 6370000000 HC RX 637 (ALT 250 FOR IP): Performed by: FAMILY MEDICINE

## 2024-08-05 PROCEDURE — 6360000002 HC RX W HCPCS: Performed by: FAMILY MEDICINE

## 2024-08-05 RX ADMIN — CEFAZOLIN 2000 MG: 10 INJECTION, POWDER, FOR SOLUTION INTRAVENOUS at 17:17

## 2024-08-05 RX ADMIN — GABAPENTIN 100 MG: 100 CAPSULE ORAL at 21:39

## 2024-08-05 RX ADMIN — INSULIN GLARGINE 22 UNITS: 100 INJECTION, SOLUTION SUBCUTANEOUS at 21:38

## 2024-08-05 RX ADMIN — SODIUM CHLORIDE, PRESERVATIVE FREE 10 ML: 5 INJECTION INTRAVENOUS at 09:16

## 2024-08-05 RX ADMIN — Medication 8 UNITS: at 17:12

## 2024-08-05 RX ADMIN — CEFAZOLIN 2000 MG: 10 INJECTION, POWDER, FOR SOLUTION INTRAVENOUS at 01:50

## 2024-08-05 RX ADMIN — HEPARIN SODIUM 5000 UNITS: 5000 INJECTION INTRAVENOUS; SUBCUTANEOUS at 21:38

## 2024-08-05 RX ADMIN — HEPARIN SODIUM 5000 UNITS: 5000 INJECTION INTRAVENOUS; SUBCUTANEOUS at 09:15

## 2024-08-05 RX ADMIN — Medication 1 CAPSULE: at 09:15

## 2024-08-05 RX ADMIN — Medication 8 UNITS: at 09:14

## 2024-08-05 RX ADMIN — CEFAZOLIN 2000 MG: 10 INJECTION, POWDER, FOR SOLUTION INTRAVENOUS at 09:14

## 2024-08-05 RX ADMIN — INSULIN LISPRO 4 UNITS: 100 INJECTION, SOLUTION INTRAVENOUS; SUBCUTANEOUS at 21:38

## 2024-08-05 RX ADMIN — Medication 8 UNITS: at 13:16

## 2024-08-05 RX ADMIN — SODIUM CHLORIDE, POTASSIUM CHLORIDE, SODIUM LACTATE AND CALCIUM CHLORIDE: 600; 310; 30; 20 INJECTION, SOLUTION INTRAVENOUS at 01:36

## 2024-08-05 RX ADMIN — MIDODRINE HYDROCHLORIDE 5 MG: 5 TABLET ORAL at 09:15

## 2024-08-05 RX ADMIN — LOPERAMIDE HYDROCHLORIDE 2 MG: 2 CAPSULE ORAL at 09:15

## 2024-08-05 RX ADMIN — MIDODRINE HYDROCHLORIDE 5 MG: 5 TABLET ORAL at 13:16

## 2024-08-05 RX ADMIN — Medication: at 21:40

## 2024-08-05 RX ADMIN — MIDODRINE HYDROCHLORIDE 5 MG: 5 TABLET ORAL at 17:12

## 2024-08-05 RX ADMIN — Medication 2 UNITS: at 09:13

## 2024-08-05 RX ADMIN — GABAPENTIN 100 MG: 100 CAPSULE ORAL at 16:06

## 2024-08-05 RX ADMIN — GABAPENTIN 100 MG: 100 CAPSULE ORAL at 09:15

## 2024-08-05 RX ADMIN — SODIUM CHLORIDE, PRESERVATIVE FREE 10 ML: 5 INJECTION INTRAVENOUS at 20:00

## 2024-08-05 RX ADMIN — Medication: at 09:23

## 2024-08-05 ASSESSMENT — PAIN SCALES - GENERAL: PAINLEVEL_OUTOF10: 0

## 2024-08-06 LAB
GLUCOSE BLD STRIP.AUTO-MCNC: 106 MG/DL (ref 65–117)
GLUCOSE BLD STRIP.AUTO-MCNC: 124 MG/DL (ref 65–117)
GLUCOSE BLD STRIP.AUTO-MCNC: 137 MG/DL (ref 65–117)
GLUCOSE BLD STRIP.AUTO-MCNC: 97 MG/DL (ref 65–117)
O+P SPEC MICRO: NORMAL
O+P STL CONC: NORMAL
SERVICE CMNT-IMP: ABNORMAL
SERVICE CMNT-IMP: ABNORMAL
SERVICE CMNT-IMP: NORMAL
SERVICE CMNT-IMP: NORMAL
SPECIMEN SOURCE: NORMAL

## 2024-08-06 PROCEDURE — 82962 GLUCOSE BLOOD TEST: CPT

## 2024-08-06 PROCEDURE — 6370000000 HC RX 637 (ALT 250 FOR IP): Performed by: INTERNAL MEDICINE

## 2024-08-06 PROCEDURE — 6370000000 HC RX 637 (ALT 250 FOR IP): Performed by: FAMILY MEDICINE

## 2024-08-06 PROCEDURE — 2580000003 HC RX 258: Performed by: INTERNAL MEDICINE

## 2024-08-06 PROCEDURE — 97116 GAIT TRAINING THERAPY: CPT | Performed by: PHYSICAL THERAPIST

## 2024-08-06 PROCEDURE — 6360000002 HC RX W HCPCS: Performed by: FAMILY MEDICINE

## 2024-08-06 PROCEDURE — 2580000003 HC RX 258: Performed by: FAMILY MEDICINE

## 2024-08-06 PROCEDURE — 6360000002 HC RX W HCPCS: Performed by: INTERNAL MEDICINE

## 2024-08-06 PROCEDURE — 1200000000 HC SEMI PRIVATE

## 2024-08-06 RX ADMIN — CEFAZOLIN 2000 MG: 10 INJECTION, POWDER, FOR SOLUTION INTRAVENOUS at 18:09

## 2024-08-06 RX ADMIN — SODIUM CHLORIDE, PRESERVATIVE FREE 10 ML: 5 INJECTION INTRAVENOUS at 21:23

## 2024-08-06 RX ADMIN — HEPARIN SODIUM 5000 UNITS: 5000 INJECTION INTRAVENOUS; SUBCUTANEOUS at 09:03

## 2024-08-06 RX ADMIN — GABAPENTIN 100 MG: 100 CAPSULE ORAL at 21:23

## 2024-08-06 RX ADMIN — HEPARIN SODIUM 5000 UNITS: 5000 INJECTION INTRAVENOUS; SUBCUTANEOUS at 21:23

## 2024-08-06 RX ADMIN — CEFAZOLIN 2000 MG: 10 INJECTION, POWDER, FOR SOLUTION INTRAVENOUS at 02:13

## 2024-08-06 RX ADMIN — Medication: at 09:02

## 2024-08-06 RX ADMIN — Medication 8 UNITS: at 18:08

## 2024-08-06 RX ADMIN — LOPERAMIDE HYDROCHLORIDE 2 MG: 2 CAPSULE ORAL at 09:00

## 2024-08-06 RX ADMIN — Medication 8 UNITS: at 09:01

## 2024-08-06 RX ADMIN — CEFAZOLIN 2000 MG: 10 INJECTION, POWDER, FOR SOLUTION INTRAVENOUS at 10:45

## 2024-08-06 RX ADMIN — GABAPENTIN 100 MG: 100 CAPSULE ORAL at 13:57

## 2024-08-06 RX ADMIN — GABAPENTIN 100 MG: 100 CAPSULE ORAL at 09:01

## 2024-08-06 RX ADMIN — MIDODRINE HYDROCHLORIDE 5 MG: 5 TABLET ORAL at 09:01

## 2024-08-06 RX ADMIN — SODIUM CHLORIDE, PRESERVATIVE FREE 10 ML: 5 INJECTION INTRAVENOUS at 09:04

## 2024-08-06 RX ADMIN — Medication 1 CAPSULE: at 09:01

## 2024-08-06 RX ADMIN — MIDODRINE HYDROCHLORIDE 5 MG: 5 TABLET ORAL at 18:08

## 2024-08-06 RX ADMIN — MIDODRINE HYDROCHLORIDE 5 MG: 5 TABLET ORAL at 13:57

## 2024-08-06 RX ADMIN — PANCRELIPASE LIPASE, PANCRELIPASE PROTEASE, PANCRELIPASE AMYLASE 5000 UNITS: 5000; 17000; 24000 CAPSULE, DELAYED RELEASE ORAL at 21:28

## 2024-08-06 RX ADMIN — Medication 8 UNITS: at 13:57

## 2024-08-06 RX ADMIN — INSULIN GLARGINE 22 UNITS: 100 INJECTION, SOLUTION SUBCUTANEOUS at 21:23

## 2024-08-06 RX ADMIN — Medication: at 21:29

## 2024-08-06 ASSESSMENT — PAIN SCALES - GENERAL: PAINLEVEL_OUTOF10: 0

## 2024-08-06 NOTE — CARE COORDINATION
ABY     RUR 12 %     IDR Rounds again this am with MD and team   ALEJANDRINA 8-8-14   Continue plan of care.  IV ABX. & Therapy.     Plan   PCP on AVS   Urology on AVS but needs more clarification to how soon he needs to be seen.  Medications Generic brands as much as possibe  Transportation family.   If he is on Disability needs to see how long he needs to wait to qualify for Medicare Benefits.      Patient will need [facility] and follow-up.   To ask nursing to provide some teaching-     Patient has no payor source at this time. Does not qualify for Medicaid at this time.  He is concerned about bills.  [facility] Application given to patient. To complete.    Called [facility]  Urology - they had tried to schedule follow-up with patient last year.  Never made an appointment at that time.  He is consider a new patient.   The earliest they have is in December- he can call to see if anyone cancels.      Josue Elena MD  Urology [phone] [address] [address]      Next Steps: Follow up on 12/26/2024  Instructions: You will see Dr. Elena on 12-26-24 @ 2:45PM.  Arrive  15 min early. bring ID , List of Medication.  Called the office back regularly to see if they have an earlier opening-       Will see if the PCP office can follow until that time.   We can try another provider but out of pocket cost will be higher.     Bruna RIVERA RN   [phone]

## 2024-08-07 ENCOUNTER — TELEPHONE (OUTPATIENT)
Age: 54
End: 2024-08-07

## 2024-08-07 LAB
GLUCOSE BLD STRIP.AUTO-MCNC: 102 MG/DL (ref 65–117)
GLUCOSE BLD STRIP.AUTO-MCNC: 123 MG/DL (ref 65–117)
GLUCOSE BLD STRIP.AUTO-MCNC: 160 MG/DL (ref 65–117)
GLUCOSE BLD STRIP.AUTO-MCNC: 178 MG/DL (ref 65–117)
GLUCOSE BLD STRIP.AUTO-MCNC: 202 MG/DL (ref 65–117)
SERVICE CMNT-IMP: ABNORMAL
SERVICE CMNT-IMP: NORMAL

## 2024-08-07 PROCEDURE — 82962 GLUCOSE BLOOD TEST: CPT

## 2024-08-07 PROCEDURE — 6370000000 HC RX 637 (ALT 250 FOR IP): Performed by: INTERNAL MEDICINE

## 2024-08-07 PROCEDURE — 6360000002 HC RX W HCPCS: Performed by: INTERNAL MEDICINE

## 2024-08-07 PROCEDURE — 6360000002 HC RX W HCPCS: Performed by: FAMILY MEDICINE

## 2024-08-07 PROCEDURE — 6370000000 HC RX 637 (ALT 250 FOR IP): Performed by: FAMILY MEDICINE

## 2024-08-07 PROCEDURE — 1200000000 HC SEMI PRIVATE

## 2024-08-07 PROCEDURE — 2580000003 HC RX 258: Performed by: INTERNAL MEDICINE

## 2024-08-07 PROCEDURE — 2580000003 HC RX 258: Performed by: FAMILY MEDICINE

## 2024-08-07 RX ADMIN — GABAPENTIN 100 MG: 100 CAPSULE ORAL at 09:44

## 2024-08-07 RX ADMIN — PANCRELIPASE LIPASE, PANCRELIPASE PROTEASE, PANCRELIPASE AMYLASE 5000 UNITS: 5000; 17000; 24000 CAPSULE, DELAYED RELEASE ORAL at 10:52

## 2024-08-07 RX ADMIN — SODIUM CHLORIDE, PRESERVATIVE FREE 10 ML: 5 INJECTION INTRAVENOUS at 17:36

## 2024-08-07 RX ADMIN — Medication 8 UNITS: at 12:07

## 2024-08-07 RX ADMIN — MIDODRINE HYDROCHLORIDE 5 MG: 5 TABLET ORAL at 17:34

## 2024-08-07 RX ADMIN — PANCRELIPASE LIPASE, PANCRELIPASE PROTEASE, PANCRELIPASE AMYLASE 5000 UNITS: 5000; 17000; 24000 CAPSULE, DELAYED RELEASE ORAL at 12:06

## 2024-08-07 RX ADMIN — MIDODRINE HYDROCHLORIDE 5 MG: 5 TABLET ORAL at 09:44

## 2024-08-07 RX ADMIN — PANCRELIPASE LIPASE, PANCRELIPASE PROTEASE, PANCRELIPASE AMYLASE 5000 UNITS: 5000; 17000; 24000 CAPSULE, DELAYED RELEASE ORAL at 17:34

## 2024-08-07 RX ADMIN — CEFAZOLIN 2000 MG: 10 INJECTION, POWDER, FOR SOLUTION INTRAVENOUS at 02:42

## 2024-08-07 RX ADMIN — CEFAZOLIN 2000 MG: 10 INJECTION, POWDER, FOR SOLUTION INTRAVENOUS at 18:05

## 2024-08-07 RX ADMIN — INSULIN GLARGINE 22 UNITS: 100 INJECTION, SOLUTION SUBCUTANEOUS at 20:52

## 2024-08-07 RX ADMIN — MIDODRINE HYDROCHLORIDE 5 MG: 5 TABLET ORAL at 12:06

## 2024-08-07 RX ADMIN — SODIUM CHLORIDE, PRESERVATIVE FREE 5 ML: 5 INJECTION INTRAVENOUS at 20:48

## 2024-08-07 RX ADMIN — Medication 1 UNITS: at 12:06

## 2024-08-07 RX ADMIN — CEFAZOLIN 2000 MG: 10 INJECTION, POWDER, FOR SOLUTION INTRAVENOUS at 10:52

## 2024-08-07 RX ADMIN — GABAPENTIN 100 MG: 100 CAPSULE ORAL at 14:34

## 2024-08-07 RX ADMIN — LOPERAMIDE HYDROCHLORIDE 2 MG: 2 CAPSULE ORAL at 09:44

## 2024-08-07 RX ADMIN — SODIUM CHLORIDE, PRESERVATIVE FREE 20 ML: 5 INJECTION INTRAVENOUS at 11:04

## 2024-08-07 RX ADMIN — HEPARIN SODIUM 5000 UNITS: 5000 INJECTION INTRAVENOUS; SUBCUTANEOUS at 20:46

## 2024-08-07 RX ADMIN — GABAPENTIN 100 MG: 100 CAPSULE ORAL at 20:45

## 2024-08-07 RX ADMIN — Medication: at 09:47

## 2024-08-07 RX ADMIN — HEPARIN SODIUM 5000 UNITS: 5000 INJECTION INTRAVENOUS; SUBCUTANEOUS at 10:52

## 2024-08-07 RX ADMIN — Medication 1 CAPSULE: at 09:44

## 2024-08-07 RX ADMIN — Medication 8 UNITS: at 18:14

## 2024-08-07 NOTE — CARE COORDINATION
ABY     RUR  12 %     See CM note from yesterday   Called the PCP office this am. Left message for the Nurse - to see if they can change his Mendes in the office- since we do not have an Urology appointment until December at [facility].  Await call back.      We will need to send a mendes kit home with the patient.     May need to go to  [facility] if the PCP office can assist.    Bruna RIVERA RN   [phone]       Addendum: 1:08pm  IDR again this am.    ALEJANDRINA plans tomorrow.     Met with patient this am to review discharge plans.   Patient indicates he expects to go home tomorrow. He indicates his sister will be here- she plans to take him home- he wants the plans to be reviewed with her.     He is aware of the PCP. Endocrinology and Urology follow-up. He is aware of the co-payment & office visit fees.    Discussed need for mendes change- and had discussed with the PCP.  He is aware if this plan is not feasible he needs to utilize one of the Free Standing ER or Urgent Care Centers.     Discussed his Disability Status - He indicates he just received his first check June 2020 and was retro back . Asked him if he know when he was eligible for Medicare Part A and B- He indicates he was not sure and will review the letter received.  Most time it is normally 2 years -unless he meets a special category.     Discussed the [facility] Care Card Process.  Patient Access and [employer] met with him- he is over resource at this time for Medicaid. He needs to update his financial information with the Care Card Process.      He is concerned about some of the funds was set aside for his son- he is firm about this  encouraged him to talk to someone about trust.  Being in a regular savings will impact any manuela application he applies for..    He indicates  he cannot afford the cost - of all that needs to be done-      Medications  he said he has 3  vials of insulin at home.  He use [employer] card.   Wants

## 2024-08-07 NOTE — TELEPHONE ENCOUNTER
Bruna Brito Dr. a case manger called and asked when patient come in for his hospital follow up appointment on August 13th if you or the nurse will be able to change his mendes catheter. She stated that she can send him with a new one as well.     Please call Bruna back at [phone] if you can or can not change the mendes.      Thank you.

## 2024-08-08 LAB
ANION GAP SERPL CALC-SCNC: 7 MMOL/L (ref 5–15)
APPEARANCE UR: CLEAR
BACTERIA URNS QL MICRO: NEGATIVE /HPF
BILIRUB UR QL: NEGATIVE
BUN SERPL-MCNC: 47 MG/DL (ref 6–20)
BUN/CREAT SERPL: 21 (ref 12–20)
CALCIUM SERPL-MCNC: 9.2 MG/DL (ref 8.5–10.1)
CHLORIDE SERPL-SCNC: 96 MMOL/L (ref 97–108)
CO2 SERPL-SCNC: 27 MMOL/L (ref 21–32)
COLOR UR: ABNORMAL
CREAT SERPL-MCNC: 2.2 MG/DL (ref 0.7–1.3)
CREAT UR-MCNC: 98.2 MG/DL
EPITH CASTS URNS QL MICRO: NORMAL /LPF
GLUCOSE BLD STRIP.AUTO-MCNC: 157 MG/DL (ref 65–117)
GLUCOSE BLD STRIP.AUTO-MCNC: 202 MG/DL (ref 65–117)
GLUCOSE BLD STRIP.AUTO-MCNC: 225 MG/DL (ref 65–117)
GLUCOSE BLD STRIP.AUTO-MCNC: 340 MG/DL (ref 65–117)
GLUCOSE SERPL-MCNC: 283 MG/DL (ref 65–100)
GLUCOSE UR STRIP.AUTO-MCNC: >1000 MG/DL
HGB UR QL STRIP: NEGATIVE
KETONES UR QL STRIP.AUTO: NEGATIVE MG/DL
LEUKOCYTE ESTERASE UR QL STRIP.AUTO: ABNORMAL
NITRITE UR QL STRIP.AUTO: NEGATIVE
PH UR STRIP: 6 (ref 5–8)
POTASSIUM SERPL-SCNC: 4.5 MMOL/L (ref 3.5–5.1)
POTASSIUM UR-SCNC: 37 MMOL/L
PROT UR STRIP-MCNC: 100 MG/DL
RBC #/AREA URNS HPF: NORMAL /HPF (ref 0–5)
SERVICE CMNT-IMP: ABNORMAL
SODIUM SERPL-SCNC: 130 MMOL/L (ref 136–145)
SODIUM UR-SCNC: 57 MMOL/L
SP GR UR REFRACTOMETRY: 1.02
UROBILINOGEN UR QL STRIP.AUTO: 0.2 EU/DL (ref 0.2–1)
UUN UR-MCNC: 809 MG/DL
WBC URNS QL MICRO: NORMAL /HPF (ref 0–4)

## 2024-08-08 PROCEDURE — 1200000000 HC SEMI PRIVATE

## 2024-08-08 PROCEDURE — 6360000002 HC RX W HCPCS: Performed by: FAMILY MEDICINE

## 2024-08-08 PROCEDURE — 36415 COLL VENOUS BLD VENIPUNCTURE: CPT

## 2024-08-08 PROCEDURE — 6370000000 HC RX 637 (ALT 250 FOR IP): Performed by: INTERNAL MEDICINE

## 2024-08-08 PROCEDURE — 2580000003 HC RX 258: Performed by: FAMILY MEDICINE

## 2024-08-08 PROCEDURE — 82962 GLUCOSE BLOOD TEST: CPT

## 2024-08-08 PROCEDURE — 84540 ASSAY OF URINE/UREA-N: CPT

## 2024-08-08 PROCEDURE — 80048 BASIC METABOLIC PNL TOTAL CA: CPT

## 2024-08-08 PROCEDURE — 81001 URINALYSIS AUTO W/SCOPE: CPT

## 2024-08-08 PROCEDURE — 84133 ASSAY OF URINE POTASSIUM: CPT

## 2024-08-08 PROCEDURE — 6360000002 HC RX W HCPCS: Performed by: INTERNAL MEDICINE

## 2024-08-08 PROCEDURE — 82570 ASSAY OF URINE CREATININE: CPT

## 2024-08-08 PROCEDURE — 84300 ASSAY OF URINE SODIUM: CPT

## 2024-08-08 PROCEDURE — 6370000000 HC RX 637 (ALT 250 FOR IP): Performed by: FAMILY MEDICINE

## 2024-08-08 PROCEDURE — 2580000003 HC RX 258: Performed by: INTERNAL MEDICINE

## 2024-08-08 RX ORDER — GABAPENTIN 100 MG/1
100 CAPSULE ORAL 2 TIMES DAILY
Status: DISCONTINUED | OUTPATIENT
Start: 2024-08-09 | End: 2024-08-10

## 2024-08-08 RX ORDER — GABAPENTIN 100 MG/1
100 CAPSULE ORAL 3 TIMES DAILY
Qty: 90 CAPSULE | Refills: 0 | Status: CANCELLED | OUTPATIENT
Start: 2024-08-08 | End: 2024-09-07

## 2024-08-08 RX ADMIN — Medication: at 21:08

## 2024-08-08 RX ADMIN — SODIUM CHLORIDE, PRESERVATIVE FREE 10 ML: 5 INJECTION INTRAVENOUS at 08:56

## 2024-08-08 RX ADMIN — HEPARIN SODIUM 5000 UNITS: 5000 INJECTION INTRAVENOUS; SUBCUTANEOUS at 08:43

## 2024-08-08 RX ADMIN — Medication: at 08:55

## 2024-08-08 RX ADMIN — PANCRELIPASE LIPASE, PANCRELIPASE PROTEASE, PANCRELIPASE AMYLASE 5000 UNITS: 5000; 17000; 24000 CAPSULE, DELAYED RELEASE ORAL at 08:24

## 2024-08-08 RX ADMIN — HEPARIN SODIUM 5000 UNITS: 5000 INJECTION INTRAVENOUS; SUBCUTANEOUS at 21:07

## 2024-08-08 RX ADMIN — CEFAZOLIN 2000 MG: 10 INJECTION, POWDER, FOR SOLUTION INTRAVENOUS at 15:02

## 2024-08-08 RX ADMIN — GABAPENTIN 100 MG: 100 CAPSULE ORAL at 08:41

## 2024-08-08 RX ADMIN — SODIUM CHLORIDE, PRESERVATIVE FREE 10 ML: 5 INJECTION INTRAVENOUS at 08:55

## 2024-08-08 RX ADMIN — PANCRELIPASE LIPASE, PANCRELIPASE PROTEASE, PANCRELIPASE AMYLASE 5000 UNITS: 5000; 17000; 24000 CAPSULE, DELAYED RELEASE ORAL at 18:14

## 2024-08-08 RX ADMIN — MIDODRINE HYDROCHLORIDE 5 MG: 5 TABLET ORAL at 12:13

## 2024-08-08 RX ADMIN — Medication 1 UNITS: at 08:23

## 2024-08-08 RX ADMIN — SODIUM CHLORIDE, PRESERVATIVE FREE 10 ML: 5 INJECTION INTRAVENOUS at 21:08

## 2024-08-08 RX ADMIN — Medication 3 UNITS: at 12:12

## 2024-08-08 RX ADMIN — Medication 8 UNITS: at 18:13

## 2024-08-08 RX ADMIN — INSULIN GLARGINE 22 UNITS: 100 INJECTION, SOLUTION SUBCUTANEOUS at 21:07

## 2024-08-08 RX ADMIN — CEFAZOLIN 2000 MG: 10 INJECTION, POWDER, FOR SOLUTION INTRAVENOUS at 09:44

## 2024-08-08 RX ADMIN — MIDODRINE HYDROCHLORIDE 5 MG: 5 TABLET ORAL at 08:41

## 2024-08-08 RX ADMIN — Medication 1 CAPSULE: at 08:41

## 2024-08-08 RX ADMIN — Medication 1 UNITS: at 18:14

## 2024-08-08 RX ADMIN — Medication 8 UNITS: at 08:22

## 2024-08-08 RX ADMIN — LOPERAMIDE HYDROCHLORIDE 2 MG: 2 CAPSULE ORAL at 08:42

## 2024-08-08 RX ADMIN — PANCRELIPASE LIPASE, PANCRELIPASE PROTEASE, PANCRELIPASE AMYLASE 5000 UNITS: 5000; 17000; 24000 CAPSULE, DELAYED RELEASE ORAL at 12:13

## 2024-08-08 RX ADMIN — GABAPENTIN 100 MG: 100 CAPSULE ORAL at 15:02

## 2024-08-08 RX ADMIN — Medication 8 UNITS: at 12:13

## 2024-08-08 RX ADMIN — CEFAZOLIN 2000 MG: 10 INJECTION, POWDER, FOR SOLUTION INTRAVENOUS at 01:41

## 2024-08-08 RX ADMIN — MIDODRINE HYDROCHLORIDE 5 MG: 5 TABLET ORAL at 18:14

## 2024-08-08 ASSESSMENT — PAIN SCALES - GENERAL
PAINLEVEL_OUTOF10: 0
PAINLEVEL_OUTOF10: 0

## 2024-08-08 NOTE — DISCHARGE INSTRUCTIONS
Dear Mr. Martinez,     You originally arrived to [facility] due to weakness.  It was found that you had diabetes ketoacidosis and received necessary fluid resuscitation and insulin to manage your diabetes.  During your hospital course it was found that you had a bacterial infection in your blood as well as a urinary tract infection which caused a kidney infection.  Furthermore you also had developed a urinary incontinence.  You were sent to [facility] hospital to receive long-term antibiotics to treat your multiple infections.  You also developed chronic diarrhea which was well-managed with loperamide.  Prior to transfer, urology was contacted regarding the urine incontinence and the [facility] was instructed to continue Christiansen catheter and attempt voiding trials.  After unsuccessful attempt of voiding trials you requested to stop voiding trials and continues a Christiansen catheter.  The risks and benefits were discussed with you about this decision and you agreed to continue with Christiansen catheter until follow-up with urology appointment.  There is concern you may have pancreatic insufficiency and as such he was started on pancreatic enzyme pills and will need to follow-up with a gastroenterologist.  You have completed antibiotic course and are approaching your baseline.  You are being discharged but need to also closely follow-up with your specialist appointments including gastroenterology, urology, and nephrology.  Make sure to see your primary care clinician within 7 days of discharge.  Thank you for choosing [facility] for your care.

## 2024-08-08 NOTE — DISCHARGE SUMMARY
fluids    ACTIVITY: activity as tolerated    WOUND CARE: none    EQUIPMENT needed: none      DISCHARGE MEDICATIONS:     Medication List        START taking these medications      blood glucose test strips  Test 3 times a day & as needed for symptoms of irregular blood glucose. Dispense sufficient amount for indicated testing frequency plus additional to accommodate PRN testing needs.     gabapentin 100 MG capsule  Commonly known as: NEURONTIN  Take 1 capsule by mouth 3 times daily for 30 days. Max Daily Amount: 300 mg     insulin lispro (1 Unit Dial) 100 UNIT/ML Sopn  Commonly known as: HumaLOG KwikPen  Inject per sliding scale 3 times daily (before meals): BG : 0 units insulin. -249: 7 units. -299: 8 units. -349: 9 units. BG over 349: 10 units.     Lantus SoloStar 100 UNIT/ML injection pen  Generic drug: insulin glargine  Inject 22 Units into the skin nightly     lipase-protease-amylase 5000-56126 units Cpep delayed release capsule  Commonly known as: ZENPEP  Take by mouth 3 times daily (with meals)     loperamide 2 MG capsule  Commonly known as: IMODIUM  Take 1 capsule by mouth daily     midodrine 5 MG tablet  Commonly known as: PROAMATINE  Take 1 tablet by mouth 3 times daily (with meals)     Pen Needles 3/16\" 31G X 5 MM Misc  1 each by Does not apply route daily            CHANGE how you take these medications      * Lancets Misc  What changed: Another medication with the same name was added. Make sure you understand how and when to take each.     * Lancets Misc  1 each by Does not apply route daily  What changed: You were already taking a medication with the same name, and this prescription was added. Make sure you understand how and when to take each.           * This list has 2 medication(s) that are the same as other medications prescribed for you. Read the directions carefully, and ask your doctor or other care provider to review them with you.                   Where to Get Your

## 2024-08-08 NOTE — CARE COORDINATION
ABY     RUR 9 %     See CM notes from yesterday and 8-6-24    Received call from the PCP office - indicates due to complexity of his care- they cannot change the mendes. Recommend follow-up with Urology office   [facility] cannot see patient until December he can call regularly for cancellation.   To discuss options again with patient - sister to be onsite and will review with her.    Called [facility] since the NP at [facility] saw him but she only covers inpatient-  [phone] [address] [place] [address] they were fairly booked-up also.  He will need Voiding trails and to see the Urologist at the same time. - Was on the phone with them for over 20 min with them trying to find a reasonable appointment time.   They can see him September 9, 2024 @ 1PM.  The office visit will be $200.00.     Called [facility] for GI appointment. Was able to schedule an appointment for  9-  on AVS.   Each of these appointments will be an out of pocket office visit for patient.     Follow-up and recommendations on his AVS - see below       Will make referral to [facility]  to see if they can see patient for Support and Resources.  Hopefully they  can still see patient - maybe telephonic since he is on [facility].       Addendum:   11:00AM   Met with patient and sister in the room.  She will provide transportation home.   Reviewed follow-up appointments. Possible cost in involved.  Answered questions.   Nursing to be in to provide teaching.   They will review SS awards letter and call for clarification on when Medicare Part A 7 B to start.   Complete the Care Card Application & send Versification- Understand this could impact cost of hospital bill he is responsible for. - he will have to follow-up individual with providers & testing-for cost or payment arrangements.  Discussed the [employer]- they will call to see if he qualifies for a plan that is cost effective.  They wanted to

## 2024-08-08 NOTE — CONSULTS
[facility] Nephrology Consult - [facility]  Requesting physician:   Date of consult: 08/08/24    CC: AMOS    This is an \"e-consult\" with data obtained by chart review and by discussion with the requesting physician. I did not directly examine or interview the patient.     HPI:  54-year-old male past medical history of type 1 diabetes with neuropathy, DKA presents to the ED due to generalized weakness and found to be in DKA and had UTI, and MSSA bacteremia. TTE showed possible vegetations however this was not seen in [facility]. ID on board and recommended 6 weeks of IV cefazolin with a stop date of 08/08/2024. Pt hospital course has been complicated with diarrhea and recurrent AMOS. Renal team consulted for recurrent AMOS. Po intake is improving. Sbp remains stable. BS remains uncontrolled in 200's. Lab holiday from 8/4 --> 8/8 where crt karmen from 1.5--> 2.2.  On midodrine. IVF were discontinued on 8/5.       PMH:  Past Medical History:   Diagnosis Date    History of diabetes insipidus     History of vascular access device 07/19/2024    [facility] VAT placed 4 fr single PICC, RT basilic 37 cm 0 cm out, arm cir 22 cm    History of weight loss     Neuropathy     Uncontrolled type 2 diabetes mellitus with hyperglycemia (HCC) 03/03/2021         PSH:  No past surgical history on file.    FH:  No family history on file.    SH:  Social History     Socioeconomic History    Marital status:      Spouse name: Not on file    Number of children: Not on file    Years of education: Not on file    Highest education level: Not on file   Occupational History    Not on file   Tobacco Use    Smoking status: Never    Smokeless tobacco: Never   Substance and Sexual Activity    Alcohol use: Not Currently    Drug use: Never    Sexual activity: Not on file   Other Topics Concern    Not on file   Social History Narrative    Not on file     Social Determinants of Health     Financial Resource Strain: Patient Declined (6/15/2023)

## 2024-08-09 ENCOUNTER — APPOINTMENT (OUTPATIENT)
Facility: HOSPITAL | Age: 54
DRG: 871 | End: 2024-08-09
Attending: INTERNAL MEDICINE

## 2024-08-09 LAB
ANION GAP SERPL CALC-SCNC: 11 MMOL/L (ref 5–15)
BUN SERPL-MCNC: 43 MG/DL (ref 6–20)
BUN/CREAT SERPL: 24 (ref 12–20)
CALCIUM SERPL-MCNC: 9.2 MG/DL (ref 8.5–10.1)
CHLORIDE SERPL-SCNC: 99 MMOL/L (ref 97–108)
CO2 SERPL-SCNC: 25 MMOL/L (ref 21–32)
CREAT SERPL-MCNC: 1.79 MG/DL (ref 0.7–1.3)
EOSINOPHIL #/AREA URNS HPF: 2
GLUCOSE BLD STRIP.AUTO-MCNC: 105 MG/DL (ref 65–117)
GLUCOSE BLD STRIP.AUTO-MCNC: 130 MG/DL (ref 65–117)
GLUCOSE BLD STRIP.AUTO-MCNC: 254 MG/DL (ref 65–117)
GLUCOSE BLD STRIP.AUTO-MCNC: 258 MG/DL (ref 65–117)
GLUCOSE BLD STRIP.AUTO-MCNC: 266 MG/DL (ref 65–117)
GLUCOSE BLD STRIP.AUTO-MCNC: 61 MG/DL (ref 65–117)
GLUCOSE BLD STRIP.AUTO-MCNC: 63 MG/DL (ref 65–117)
GLUCOSE SERPL-MCNC: 273 MG/DL (ref 65–100)
POTASSIUM SERPL-SCNC: 4.7 MMOL/L (ref 3.5–5.1)
SERVICE CMNT-IMP: ABNORMAL
SERVICE CMNT-IMP: NORMAL
SODIUM SERPL-SCNC: 135 MMOL/L (ref 136–145)

## 2024-08-09 PROCEDURE — 36415 COLL VENOUS BLD VENIPUNCTURE: CPT

## 2024-08-09 PROCEDURE — 6360000002 HC RX W HCPCS: Performed by: FAMILY MEDICINE

## 2024-08-09 PROCEDURE — 1200000000 HC SEMI PRIVATE

## 2024-08-09 PROCEDURE — 2580000003 HC RX 258: Performed by: FAMILY MEDICINE

## 2024-08-09 PROCEDURE — 82962 GLUCOSE BLOOD TEST: CPT

## 2024-08-09 PROCEDURE — 80048 BASIC METABOLIC PNL TOTAL CA: CPT

## 2024-08-09 PROCEDURE — 87205 SMEAR GRAM STAIN: CPT

## 2024-08-09 PROCEDURE — 2580000003 HC RX 258: Performed by: INTERNAL MEDICINE

## 2024-08-09 PROCEDURE — 6370000000 HC RX 637 (ALT 250 FOR IP): Performed by: FAMILY MEDICINE

## 2024-08-09 PROCEDURE — 76770 US EXAM ABDO BACK WALL COMP: CPT

## 2024-08-09 PROCEDURE — 6370000000 HC RX 637 (ALT 250 FOR IP): Performed by: INTERNAL MEDICINE

## 2024-08-09 RX ORDER — INSULIN GLARGINE 100 [IU]/ML
25 INJECTION, SOLUTION SUBCUTANEOUS EVERY EVENING
Status: DISCONTINUED | OUTPATIENT
Start: 2024-08-09 | End: 2024-08-10

## 2024-08-09 RX ORDER — SODIUM CHLORIDE, SODIUM LACTATE, POTASSIUM CHLORIDE, AND CALCIUM CHLORIDE .6; .31; .03; .02 G/100ML; G/100ML; G/100ML; G/100ML
1000 INJECTION, SOLUTION INTRAVENOUS ONCE
Status: COMPLETED | OUTPATIENT
Start: 2024-08-09 | End: 2024-08-09

## 2024-08-09 RX ORDER — INSULIN LISPRO 100 [IU]/ML
9 INJECTION, SOLUTION INTRAVENOUS; SUBCUTANEOUS
Status: DISCONTINUED | OUTPATIENT
Start: 2024-08-09 | End: 2024-08-10

## 2024-08-09 RX ADMIN — DEXTROSE MONOHYDRATE 1000 ML: 100 INJECTION, SOLUTION INTRAVENOUS at 21:10

## 2024-08-09 RX ADMIN — MIDODRINE HYDROCHLORIDE 5 MG: 5 TABLET ORAL at 13:12

## 2024-08-09 RX ADMIN — Medication 2 UNITS: at 13:13

## 2024-08-09 RX ADMIN — MIDODRINE HYDROCHLORIDE 5 MG: 5 TABLET ORAL at 17:45

## 2024-08-09 RX ADMIN — Medication 8 UNITS: at 08:42

## 2024-08-09 RX ADMIN — GABAPENTIN 100 MG: 100 CAPSULE ORAL at 21:21

## 2024-08-09 RX ADMIN — Medication 8 UNITS: at 13:13

## 2024-08-09 RX ADMIN — SODIUM CHLORIDE, PRESERVATIVE FREE 10 ML: 5 INJECTION INTRAVENOUS at 08:46

## 2024-08-09 RX ADMIN — PANCRELIPASE LIPASE, PANCRELIPASE PROTEASE, PANCRELIPASE AMYLASE 5000 UNITS: 5000; 17000; 24000 CAPSULE, DELAYED RELEASE ORAL at 13:12

## 2024-08-09 RX ADMIN — PANCRELIPASE LIPASE, PANCRELIPASE PROTEASE, PANCRELIPASE AMYLASE 5000 UNITS: 5000; 17000; 24000 CAPSULE, DELAYED RELEASE ORAL at 17:46

## 2024-08-09 RX ADMIN — SODIUM CHLORIDE, POTASSIUM CHLORIDE, SODIUM LACTATE AND CALCIUM CHLORIDE 1000 ML: 600; 310; 30; 20 INJECTION, SOLUTION INTRAVENOUS at 10:55

## 2024-08-09 RX ADMIN — Medication 2 UNITS: at 18:01

## 2024-08-09 RX ADMIN — HEPARIN SODIUM 5000 UNITS: 5000 INJECTION INTRAVENOUS; SUBCUTANEOUS at 21:21

## 2024-08-09 RX ADMIN — Medication 1 CAPSULE: at 08:24

## 2024-08-09 RX ADMIN — MIDODRINE HYDROCHLORIDE 5 MG: 5 TABLET ORAL at 08:24

## 2024-08-09 RX ADMIN — PANCRELIPASE LIPASE, PANCRELIPASE PROTEASE, PANCRELIPASE AMYLASE 5000 UNITS: 5000; 17000; 24000 CAPSULE, DELAYED RELEASE ORAL at 09:14

## 2024-08-09 RX ADMIN — Medication: at 21:22

## 2024-08-09 RX ADMIN — LOPERAMIDE HYDROCHLORIDE 2 MG: 2 CAPSULE ORAL at 08:24

## 2024-08-09 RX ADMIN — GABAPENTIN 100 MG: 100 CAPSULE ORAL at 08:23

## 2024-08-09 RX ADMIN — Medication 2 UNITS: at 08:43

## 2024-08-09 RX ADMIN — SODIUM CHLORIDE, PRESERVATIVE FREE 10 ML: 5 INJECTION INTRAVENOUS at 21:21

## 2024-08-09 RX ADMIN — HEPARIN SODIUM 5000 UNITS: 5000 INJECTION INTRAVENOUS; SUBCUTANEOUS at 08:43

## 2024-08-09 RX ADMIN — Medication: at 18:01

## 2024-08-09 RX ADMIN — INSULIN LISPRO 9 UNITS: 100 INJECTION, SOLUTION INTRAVENOUS; SUBCUTANEOUS at 18:00

## 2024-08-09 ASSESSMENT — PAIN SCALES - GENERAL: PAINLEVEL_OUTOF10: 0

## 2024-08-09 NOTE — CARE COORDINATION
ABY       RUR 12 %       IDR rounds this am with MD and team.  Continue plan of care- Monitor labs. Nephrology following   ALEJANDRINA  24-48 depending on  medical stability.     See CM previous note for details.     Plan   PCP on AVS  Urology on AVS   [facility] ER or  Urgent Care on AVS   Endocrinology on AVS  Gi on AVS   Pharmacy-   [facility]S- [facility] - to call patient   Transportation- Sister Ms. Ragsdale- [phone]   [facility] and care- Nursing- to give leg bag   No Compliance letter needed- self-pay- inpatient status.      Bruna Mckinley MSW RN   [phone]

## 2024-08-10 VITALS
HEART RATE: 103 BPM | DIASTOLIC BLOOD PRESSURE: 70 MMHG | OXYGEN SATURATION: 100 % | SYSTOLIC BLOOD PRESSURE: 119 MMHG | WEIGHT: 102 LBS | HEIGHT: 62 IN | BODY MASS INDEX: 18.77 KG/M2 | RESPIRATION RATE: 16 BRPM | TEMPERATURE: 98.2 F

## 2024-08-10 LAB
ANION GAP SERPL CALC-SCNC: 5 MMOL/L (ref 5–15)
BASOPHILS # BLD: 0.1 K/UL (ref 0–0.1)
BASOPHILS NFR BLD: 1 % (ref 0–1)
BUN SERPL-MCNC: 42 MG/DL (ref 6–20)
BUN/CREAT SERPL: 26 (ref 12–20)
CALCIUM SERPL-MCNC: 9.3 MG/DL (ref 8.5–10.1)
CHLORIDE SERPL-SCNC: 101 MMOL/L (ref 97–108)
CO2 SERPL-SCNC: 28 MMOL/L (ref 21–32)
CREAT SERPL-MCNC: 1.64 MG/DL (ref 0.7–1.3)
DIFFERENTIAL METHOD BLD: ABNORMAL
EOSINOPHIL # BLD: 0.5 K/UL (ref 0–0.4)
EOSINOPHIL NFR BLD: 6 % (ref 0–7)
ERYTHROCYTE [DISTWIDTH] IN BLOOD BY AUTOMATED COUNT: 13.2 % (ref 11.5–14.5)
GLUCOSE BLD STRIP.AUTO-MCNC: 178 MG/DL (ref 65–117)
GLUCOSE BLD STRIP.AUTO-MCNC: 201 MG/DL (ref 65–117)
GLUCOSE BLD STRIP.AUTO-MCNC: 216 MG/DL (ref 65–117)
GLUCOSE BLD STRIP.AUTO-MCNC: 266 MG/DL (ref 65–117)
GLUCOSE BLD STRIP.AUTO-MCNC: 420 MG/DL (ref 65–117)
GLUCOSE BLD STRIP.AUTO-MCNC: 441 MG/DL (ref 65–117)
GLUCOSE SERPL-MCNC: 219 MG/DL (ref 65–100)
HCT VFR BLD AUTO: 31.4 % (ref 36.6–50.3)
HGB BLD-MCNC: 10.1 G/DL (ref 12.1–17)
IMM GRANULOCYTES # BLD AUTO: 0.1 K/UL (ref 0–0.04)
IMM GRANULOCYTES NFR BLD AUTO: 1 % (ref 0–0.5)
LYMPHOCYTES # BLD: 1.7 K/UL (ref 0.8–3.5)
LYMPHOCYTES NFR BLD: 18 % (ref 12–49)
MCH RBC QN AUTO: 30.7 PG (ref 26–34)
MCHC RBC AUTO-ENTMCNC: 32.2 G/DL (ref 30–36.5)
MCV RBC AUTO: 95.4 FL (ref 80–99)
MONOCYTES # BLD: 0.8 K/UL (ref 0–1)
NEUTS SEG # BLD: 6 K/UL (ref 1.8–8)
NEUTS SEG NFR BLD: 65 % (ref 32–75)
NRBC # BLD: 0 K/UL (ref 0–0.01)
NRBC BLD-RTO: 0 PER 100 WBC
PLATELET # BLD AUTO: 291 K/UL (ref 150–400)
PMV BLD AUTO: 9.5 FL (ref 8.9–12.9)
POTASSIUM SERPL-SCNC: 4.3 MMOL/L (ref 3.5–5.1)
RBC # BLD AUTO: 3.29 M/UL (ref 4.1–5.7)
SERVICE CMNT-IMP: ABNORMAL
SODIUM SERPL-SCNC: 134 MMOL/L (ref 136–145)
WBC # BLD AUTO: 9.2 K/UL (ref 4.1–11.1)

## 2024-08-10 PROCEDURE — 6370000000 HC RX 637 (ALT 250 FOR IP): Performed by: FAMILY MEDICINE

## 2024-08-10 PROCEDURE — 85025 COMPLETE CBC W/AUTO DIFF WBC: CPT

## 2024-08-10 PROCEDURE — 6370000000 HC RX 637 (ALT 250 FOR IP): Performed by: HOSPITALIST

## 2024-08-10 PROCEDURE — 80048 BASIC METABOLIC PNL TOTAL CA: CPT

## 2024-08-10 PROCEDURE — 36415 COLL VENOUS BLD VENIPUNCTURE: CPT

## 2024-08-10 PROCEDURE — 2580000003 HC RX 258: Performed by: FAMILY MEDICINE

## 2024-08-10 PROCEDURE — 6370000000 HC RX 637 (ALT 250 FOR IP): Performed by: INTERNAL MEDICINE

## 2024-08-10 PROCEDURE — 82962 GLUCOSE BLOOD TEST: CPT

## 2024-08-10 PROCEDURE — 6360000002 HC RX W HCPCS: Performed by: FAMILY MEDICINE

## 2024-08-10 RX ORDER — LANCETS 30 GAUGE
1 EACH MISCELLANEOUS DAILY
Qty: 200 EACH | Refills: 0 | Status: SHIPPED | OUTPATIENT
Start: 2024-08-10

## 2024-08-10 RX ORDER — INSULIN LISPRO 100 [IU]/ML
INJECTION, SOLUTION INTRAVENOUS; SUBCUTANEOUS
Qty: 15 ML | Refills: 0 | Status: SHIPPED | OUTPATIENT
Start: 2024-08-10

## 2024-08-10 RX ORDER — PEN NEEDLE, DIABETIC 30 GX5/16"
1 NEEDLE, DISPOSABLE MISCELLANEOUS DAILY
Qty: 100 EACH | Refills: 0 | Status: SHIPPED | OUTPATIENT
Start: 2024-08-10

## 2024-08-10 RX ORDER — INSULIN GLARGINE 100 [IU]/ML
24 INJECTION, SOLUTION SUBCUTANEOUS DAILY
Status: DISCONTINUED | OUTPATIENT
Start: 2024-08-10 | End: 2024-08-10 | Stop reason: HOSPADM

## 2024-08-10 RX ORDER — INSULIN GLARGINE 100 [IU]/ML
22 INJECTION, SOLUTION SUBCUTANEOUS NIGHTLY
Qty: 7 ML | Refills: 0 | Status: SHIPPED | OUTPATIENT
Start: 2024-08-10

## 2024-08-10 RX ORDER — INSULIN LISPRO 100 [IU]/ML
8 INJECTION, SOLUTION INTRAVENOUS; SUBCUTANEOUS
Status: COMPLETED | OUTPATIENT
Start: 2024-08-10 | End: 2024-08-10

## 2024-08-10 RX ORDER — INSULIN LISPRO 100 [IU]/ML
8 INJECTION, SOLUTION INTRAVENOUS; SUBCUTANEOUS
Status: DISCONTINUED | OUTPATIENT
Start: 2024-08-10 | End: 2024-08-10 | Stop reason: HOSPADM

## 2024-08-10 RX ORDER — GABAPENTIN 100 MG/1
100 CAPSULE ORAL 3 TIMES DAILY
Status: DISCONTINUED | OUTPATIENT
Start: 2024-08-10 | End: 2024-08-10 | Stop reason: HOSPADM

## 2024-08-10 RX ORDER — INSULIN LISPRO 100 [IU]/ML
8 INJECTION, SOLUTION INTRAVENOUS; SUBCUTANEOUS
Status: DISCONTINUED | OUTPATIENT
Start: 2024-08-10 | End: 2024-08-10

## 2024-08-10 RX ORDER — LORAZEPAM 2 MG/ML
4 INJECTION INTRAMUSCULAR ONCE
Status: DISCONTINUED | OUTPATIENT
Start: 2024-08-10 | End: 2024-08-10

## 2024-08-10 RX ORDER — GABAPENTIN 100 MG/1
100 CAPSULE ORAL 3 TIMES DAILY
Qty: 90 CAPSULE | Refills: 0 | Status: SHIPPED | OUTPATIENT
Start: 2024-08-10 | End: 2024-09-09

## 2024-08-10 RX ORDER — GLUCOSAMINE HCL/CHONDROITIN SU 500-400 MG
CAPSULE ORAL
Qty: 150 STRIP | Refills: 0 | Status: SHIPPED | OUTPATIENT
Start: 2024-08-10

## 2024-08-10 RX ORDER — MIDODRINE HYDROCHLORIDE 5 MG/1
5 TABLET ORAL
Qty: 90 TABLET | Refills: 0 | Status: SHIPPED | OUTPATIENT
Start: 2024-08-10

## 2024-08-10 RX ORDER — INSULIN GLARGINE 100 [IU]/ML
24 INJECTION, SOLUTION SUBCUTANEOUS EVERY EVENING
Status: DISCONTINUED | OUTPATIENT
Start: 2024-08-10 | End: 2024-08-10

## 2024-08-10 RX ORDER — LOPERAMIDE HYDROCHLORIDE 2 MG/1
2 CAPSULE ORAL DAILY
Qty: 30 CAPSULE | Refills: 0 | Status: SHIPPED | OUTPATIENT
Start: 2024-08-10 | End: 2024-09-09

## 2024-08-10 RX ORDER — INSULIN LISPRO 100 [IU]/ML
8 INJECTION, SOLUTION INTRAVENOUS; SUBCUTANEOUS ONCE
Status: COMPLETED | OUTPATIENT
Start: 2024-08-10 | End: 2024-08-10

## 2024-08-10 RX ADMIN — INSULIN GLARGINE 24 UNITS: 100 INJECTION, SOLUTION SUBCUTANEOUS at 09:36

## 2024-08-10 RX ADMIN — Medication 1 CAPSULE: at 09:04

## 2024-08-10 RX ADMIN — Medication 2 UNITS: at 17:29

## 2024-08-10 RX ADMIN — INSULIN LISPRO 8 UNITS: 100 INJECTION, SOLUTION INTRAVENOUS; SUBCUTANEOUS at 17:28

## 2024-08-10 RX ADMIN — LOPERAMIDE HYDROCHLORIDE 2 MG: 2 CAPSULE ORAL at 09:04

## 2024-08-10 RX ADMIN — INSULIN LISPRO 8 UNITS: 100 INJECTION, SOLUTION INTRAVENOUS; SUBCUTANEOUS at 09:37

## 2024-08-10 RX ADMIN — Medication: at 09:42

## 2024-08-10 RX ADMIN — INSULIN LISPRO 8 UNITS: 100 INJECTION, SOLUTION INTRAVENOUS; SUBCUTANEOUS at 01:49

## 2024-08-10 RX ADMIN — MIDODRINE HYDROCHLORIDE 5 MG: 5 TABLET ORAL at 09:04

## 2024-08-10 RX ADMIN — HEPARIN SODIUM 5000 UNITS: 5000 INJECTION INTRAVENOUS; SUBCUTANEOUS at 09:19

## 2024-08-10 RX ADMIN — SODIUM CHLORIDE, PRESERVATIVE FREE 10 ML: 5 INJECTION INTRAVENOUS at 09:40

## 2024-08-10 RX ADMIN — PANCRELIPASE LIPASE, PANCRELIPASE PROTEASE, PANCRELIPASE AMYLASE 5000 UNITS: 5000; 17000; 24000 CAPSULE, DELAYED RELEASE ORAL at 09:53

## 2024-08-10 RX ADMIN — Medication 1 UNITS: at 09:18

## 2024-08-10 RX ADMIN — MIDODRINE HYDROCHLORIDE 5 MG: 5 TABLET ORAL at 17:28

## 2024-08-10 RX ADMIN — MIDODRINE HYDROCHLORIDE 5 MG: 5 TABLET ORAL at 12:00

## 2024-08-10 RX ADMIN — PANCRELIPASE LIPASE, PANCRELIPASE PROTEASE, PANCRELIPASE AMYLASE 5000 UNITS: 5000; 17000; 24000 CAPSULE, DELAYED RELEASE ORAL at 12:01

## 2024-08-10 RX ADMIN — GABAPENTIN 100 MG: 100 CAPSULE ORAL at 09:04

## 2024-08-10 RX ADMIN — PANCRELIPASE LIPASE, PANCRELIPASE PROTEASE, PANCRELIPASE AMYLASE 5000 UNITS: 5000; 17000; 24000 CAPSULE, DELAYED RELEASE ORAL at 17:28

## 2024-08-10 RX ADMIN — INSULIN LISPRO 8 UNITS: 100 INJECTION, SOLUTION INTRAVENOUS; SUBCUTANEOUS at 12:01

## 2024-08-10 NOTE — PLAN OF CARE
Problem: Genitourinary - Adult  Goal: Urinary catheter remains patent  Outcome: Progressing     Problem: Skin/Tissue Integrity - Adult  Goal: Skin integrity remains intact  Outcome: Progressing     
  Problem: Physical Therapy - Adult  Goal: By Discharge: Performs mobility at highest level of function for planned discharge setting.  See evaluation for individualized goals.  Description: FUNCTIONAL STATUS PRIOR TO ADMISSION: Patient was independent and active without use of DME.    HOME SUPPORT PRIOR TO ADMISSION: The patient lived alone with no local support.    Physical Therapy Goals  Initiated 7/23/2024  1.  Patient will move from supine to sit and sit to supine  in bed with independence within 7 day(s).    2.  Patient will transfer from bed to chair and chair to bed with independence using the least restrictive device within 7 day(s).  3.  Patient will perform sit to stand with independence within 7 day(s).  4.  Patient will ambulate with independence for 300 feet with the least restrictive device within 7 day(s).   5.  Patient will ascend/descend 3 stairs without handrail(s) with independence within 7 day(s).    Outcome: Progressing   PHYSICAL THERAPY EVALUATION    Patient: Steve Martinez (54 y.o. male)  Date: 7/23/2024  Primary Diagnosis: MSSA bacteremia [R78.81, B95.61]       Precautions:                        ASSESSMENT :   DEFICITS/IMPAIRMENTS:   The patient is limited by decreased strength, activity tolerance, endurance, orthostatic hypotension     Based on the impairments listed above the patient is most limited by signs and symptoms of orthostatic hypotension. Patient is independent with bed mobility and sit to stand transfers. Gait assessment was limited due to orthostatics, but patient was able to walk in room a few week with stand-by assist without using RW. Patient extensively educated on the signs and symptoms of orthostatic hypotension and safety strategies for preventing falls including transitioning positrons slowly and waiting 1-3min before standing and ambulating.    Patient will benefit from skilled intervention to address the above impairments.    Functional Outcome Measure:  The 
  Problem: Physical Therapy - Adult  Goal: By Discharge: Performs mobility at highest level of function for planned discharge setting.  See evaluation for individualized goals.  Description: FUNCTIONAL STATUS PRIOR TO ADMISSION: Patient was independent and active without use of DME.    HOME SUPPORT PRIOR TO ADMISSION: The patient lived alone with no local support.    Physical Therapy Goals  Initiated 7/23/2024  1.  Patient will move from supine to sit and sit to supine  in bed with independence within 7 day(s).    2.  Patient will transfer from bed to chair and chair to bed with independence using the least restrictive device within 7 day(s).  3.  Patient will perform sit to stand with independence within 7 day(s).  4.  Patient will ambulate with independence for 300 feet with the least restrictive device within 7 day(s).   5.  Patient will ascend/descend 3 stairs without handrail(s) with independence within 7 day(s).    Outcome: Progressing   PHYSICAL THERAPY TREATMENT    Patient: Steve Martinez (54 y.o. male)  Date: 7/26/2024  Diagnosis: MSSA bacteremia [R78.81, B95.61] MSSA bacteremia      Precautions: fall risk      ASSESSMENT:  Patient continues to benefit from skilled PT services and is progressing towards goals. Patient able to ambulate in combs with support form IV pole with stand by assist. Patient's BP is improving and he reported no lightheadedness during our session today. Future PT session will focus on improving gross strength and activity tolerance.       07/26/24 1050 07/26/24 1100   Vitals   Blood Pressure Sitting 103/64  --    Pulse Sitting 93 PER MINUTE  --    Blood Pressure Standing 93/63 95/59   Pulse Standing 104 PER MINUTE 102 PER MINUTE          PLAN:  Patient continues to benefit from skilled intervention to address the above impairments.  Continue treatment per established plan of care.    Recommend with staff: therapy recommendations for staff: The patient is safe to mobilize ad say with 
  Problem: Physical Therapy - Adult  Goal: By Discharge: Performs mobility at highest level of function for planned discharge setting.  See evaluation for individualized goals.  Description: FUNCTIONAL STATUS PRIOR TO ADMISSION: Patient was independent and active without use of DME.    HOME SUPPORT PRIOR TO ADMISSION: The patient lived alone with no local support.    Physical Therapy Goals  Initiated 7/23/2024, progressed 7/30/24  1.  Patient will move from supine to sit and sit to supine  in bed with independence within 7 day(s).  MET  2.  Patient will transfer from bed to chair and chair to bed with independence using the least restrictive device within 7 day(s). MET  3.  Patient will perform sit to stand with independence within 7 day(s). MET  4.  Patient will ambulate with independence for 300 feet with the least restrictive device within 7 day(s). In progress  5.  Patient will ascend/descend 3 stairs without handrail(s) with independence within 7 day(s). In progress    Outcome: Completed   PHYSICAL THERAPY TREATMENT/DISCHARGE    Patient: Steve Martinez (54 y.o. male)  Date: 8/6/2024  Diagnosis: MSSA bacteremia [R78.81, B95.61] MSSA bacteremia        ASSESSMENT:  Patient has been followed by skilled PT services and has progressed towards goals. Patient is able to ambulate indefinitely in hallway without assistance from staff. He has negotiated one flight of stairs multiple times with therapy staff and no longer requires physical assistance. Patient has met all functional goals and PT plan of care is being completed at this time.        PLAN:  Maximum therapeutic benefit has been met at current level of care and patient will be discharged from physical therapy at this time.    Rationale for discharge:  Goals achieved    Recommendation for discharge: (in order for the patient to meet his/her long term goals): No skilled physical therapy    Other factors to consider for discharge: lives alone    IF patient 
  Problem: Physical Therapy - Adult  Goal: By Discharge: Performs mobility at highest level of function for planned discharge setting.  See evaluation for individualized goals.  Description: FUNCTIONAL STATUS PRIOR TO ADMISSION: Patient was independent and active without use of DME.    HOME SUPPORT PRIOR TO ADMISSION: The patient lived alone with no local support.    Physical Therapy Goals  Initiated 7/23/2024, progressed 7/30/24  1.  Patient will move from supine to sit and sit to supine  in bed with independence within 7 day(s).  MET  2.  Patient will transfer from bed to chair and chair to bed with independence using the least restrictive device within 7 day(s). MET  3.  Patient will perform sit to stand with independence within 7 day(s). MET  4.  Patient will ambulate with independence for 300 feet with the least restrictive device within 7 day(s). In progress  5.  Patient will ascend/descend 3 stairs without handrail(s) with independence within 7 day(s). In progress    Outcome: Progressing   PHYSICAL THERAPY TREATMENT    Patient: Steve Martinez (54 y.o. male)  Date: 7/30/2024  Diagnosis: MSSA bacteremia [R78.81, B95.61] MSSA bacteremia        ASSESSMENT:  Patient continues to benefit from skilled PT services and is progressing towards goals. Patient greeted sitting out of bed in chair with family present. Patient agreeable to gait and stair training during pT session and was able to ambulate ~200ft without assistive device using stand-by assist - supervision. Patient negotiated 10 stairs with use of single handrail and contact guard assist. PT encouraged continue independent exercise program and future PT session with progress exercises and continue to focus on activity tolerance and stair negotiation.          PLAN:  Patient continues to benefit from skilled intervention to address the above impairments.  Continue treatment per established plan of care.        Recommendation for discharge: (in order for the 
  Problem: Physical Therapy - Adult  Goal: By Discharge: Performs mobility at highest level of function for planned discharge setting.  See evaluation for individualized goals.  Description: FUNCTIONAL STATUS PRIOR TO ADMISSION: Patient was independent and active without use of DME.    HOME SUPPORT PRIOR TO ADMISSION: The patient lived alone with no local support.    Physical Therapy Goals  Initiated 7/23/2024, progressed 7/30/24  1.  Patient will move from supine to sit and sit to supine  in bed with independence within 7 day(s).  MET  2.  Patient will transfer from bed to chair and chair to bed with independence using the least restrictive device within 7 day(s). MET  3.  Patient will perform sit to stand with independence within 7 day(s). MET  4.  Patient will ambulate with independence for 300 feet with the least restrictive device within 7 day(s). In progress  5.  Patient will ascend/descend 3 stairs without handrail(s) with independence within 7 day(s). In progress    Outcome: Progressing   PHYSICAL THERAPY TREATMENT    Patient: Steve Martinez (54 y.o. male)  Date: 8/1/2024  Diagnosis: MSSA bacteremia [R78.81, B95.61] MSSA bacteremia       ASSESSMENT:  Patient continues to benefit from skilled PT services and is progressing towards goals. Patient greeted out of room ambulating in hallway. Patient ambulated ~150ft while managing IV pole and avoiding obstacles to return to room for lunch. PT will continue to work with patient to improve stair negotiation and progress independent exercise program before signing off on orders as patient is near their functional baseline.          PLAN:  Patient continues to benefit from skilled intervention to address the above impairments.  Continue treatment per established plan of care.    Recommend with staff: therapy recommendations for staff: The patient is safe to mobilize ad say without additional equipment.      Recommendation for discharge: (in order for the patient 
  Problem: Safety - Adult  Goal: Free from fall injury  7/21/2024 1050 by Kay Flores RN  Outcome: Progressing  7/20/2024 2152 by Dennys Muniz RN  Outcome: Progressing     Problem: Discharge Planning  Goal: Discharge to home or other facility with appropriate resources  7/21/2024 1050 by Kay Flores RN  Outcome: Progressing  7/20/2024 2152 by Dennys Muniz RN  Outcome: Progressing     Problem: Chronic Conditions and Co-morbidities  Goal: Patient's chronic conditions and co-morbidity symptoms are monitored and maintained or improved  7/20/2024 2152 by Dennys Muniz, RN  Outcome: Progressing     
  Problem: Safety - Adult  Goal: Free from fall injury  7/22/2024 1141 by Kay Flores, RN  Outcome: Progressing  7/22/2024 0603 by Jayjay Hernandez, RN  Outcome: Progressing     
  Problem: Safety - Adult  Goal: Free from fall injury  7/24/2024 2102 by Dennys Muniz RN  Outcome: Progressing     Problem: Safety - Adult  Goal: Free from fall injury  7/24/2024 2102 by Dennys Muniz RN  Outcome: Progressing     Problem: Safety - Adult  Goal: Free from fall injury  7/24/2024 2102 by Dennys Muniz RN  Outcome: Progressing  7/24/2024 1546 by Lakisha Shay RN  Outcome: Progressing     Problem: Discharge Planning  Goal: Discharge to home or other facility with appropriate resources  7/24/2024 2102 by Dennys Muniz RN  Outcome: Progressing  7/24/2024 1546 by Lakisha Shay RN  Outcome: Progressing  Flowsheets (Taken 7/24/2024 0800)  Discharge to home or other facility with appropriate resources: Identify barriers to discharge with patient and caregiver     Problem: Chronic Conditions and Co-morbidities  Goal: Patient's chronic conditions and co-morbidity symptoms are monitored and maintained or improved  7/24/2024 2102 by Dennys Muniz RN  Outcome: Progressing  7/24/2024 1546 by Lakisha Shay RN  Outcome: Progressing  Flowsheets (Taken 7/24/2024 0800)  Care Plan - Patient's Chronic Conditions and Co-Morbidity Symptoms are Monitored and Maintained or Improved: Monitor and assess patient's chronic conditions and comorbid symptoms for stability, deterioration, or improvement     
  Problem: Safety - Adult  Goal: Free from fall injury  7/25/2024 1000 by Stephen Gutierrez RN  Outcome: Progressing  7/24/2024 2102 by Dennys Muniz RN  Outcome: Progressing     Problem: Discharge Planning  Goal: Discharge to home or other facility with appropriate resources  7/25/2024 1000 by Stephen Gutierrez RN  Outcome: Progressing  7/24/2024 2102 by Dennys Muniz RN  Outcome: Progressing     Problem: Chronic Conditions and Co-morbidities  Goal: Patient's chronic conditions and co-morbidity symptoms are monitored and maintained or improved  7/24/2024 2102 by Dennys Muniz, RN  Outcome: Progressing     
  Problem: Safety - Adult  Goal: Free from fall injury  7/27/2024 0857 by Vandana Brian, RN  Outcome: Progressing  7/27/2024 0236 by Jayjay Hernandez RN  Outcome: Progressing     Problem: Discharge Planning  Goal: Discharge to home or other facility with appropriate resources  Outcome: Progressing     Problem: Chronic Conditions and Co-morbidities  Goal: Patient's chronic conditions and co-morbidity symptoms are monitored and maintained or improved  Outcome: Progressing     Problem: ABCDS Injury Assessment  Goal: Absence of physical injury  Outcome: Progressing     
  Problem: Safety - Adult  Goal: Free from fall injury  7/28/2024 0742 by Vandana Brian, RN  Outcome: Progressing  7/28/2024 0601 by Jayjay Hernandez RN  Outcome: Progressing     Problem: Discharge Planning  Goal: Discharge to home or other facility with appropriate resources  Outcome: Progressing     Problem: Chronic Conditions and Co-morbidities  Goal: Patient's chronic conditions and co-morbidity symptoms are monitored and maintained or improved  Outcome: Progressing     Problem: ABCDS Injury Assessment  Goal: Absence of physical injury  Outcome: Progressing     
  Problem: Safety - Adult  Goal: Free from fall injury  7/30/2024 2220 by Jhoana Gavin RN  Outcome: Progressing  7/30/2024 0856 by Dee Mendoza LPN  Outcome: Progressing     Problem: Discharge Planning  Goal: Discharge to home or other facility with appropriate resources  7/30/2024 2220 by Jhoana Gavin RN  Outcome: Progressing  7/30/2024 0856 by Dee Mendoza LPN  Outcome: Progressing     Problem: Chronic Conditions and Co-morbidities  Goal: Patient's chronic conditions and co-morbidity symptoms are monitored and maintained or improved  7/30/2024 2220 by Jhoana Gavin RN  Outcome: Progressing  7/30/2024 0856 by Dee Mendoza LPN  Outcome: Progressing     Problem: ABCDS Injury Assessment  Goal: Absence of physical injury  7/30/2024 2220 by Jhoana Gavin RN  Outcome: Progressing  7/30/2024 0856 by Dee Mendoza LPN  Outcome: Progressing     Problem: Physical Therapy - Adult  Goal: By Discharge: Performs mobility at highest level of function for planned discharge setting.  See evaluation for individualized goals.  Description: FUNCTIONAL STATUS PRIOR TO ADMISSION: Patient was independent and active without use of DME.    HOME SUPPORT PRIOR TO ADMISSION: The patient lived alone with no local support.    Physical Therapy Goals  Initiated 7/23/2024, progressed 7/30/24  1.  Patient will move from supine to sit and sit to supine  in bed with independence within 7 day(s).  MET  2.  Patient will transfer from bed to chair and chair to bed with independence using the least restrictive device within 7 day(s). MET  3.  Patient will perform sit to stand with independence within 7 day(s). MET  4.  Patient will ambulate with independence for 300 feet with the least restrictive device within 7 day(s). In progress  5.  Patient will ascend/descend 3 stairs without handrail(s) with independence within 7 day(s). In progress    7/30/2024 1317 by Cody Issa, PT  Outcome: Progressing     
  Problem: Safety - Adult  Goal: Free from fall injury  7/31/2024 1158 by Shayla Rosales RN  Outcome: Progressing  7/30/2024 2220 by Jhoana Gavin RN  Outcome: Progressing     Problem: Discharge Planning  Goal: Discharge to home or other facility with appropriate resources  7/31/2024 1158 by Shayla Rosales RN  Outcome: Progressing  7/30/2024 2220 by Jhoana Gavin RN  Outcome: Progressing     Problem: Chronic Conditions and Co-morbidities  Goal: Patient's chronic conditions and co-morbidity symptoms are monitored and maintained or improved  7/31/2024 1158 by Shayla Rosales RN  Outcome: Progressing  7/30/2024 2220 by Jhoana Gavin RN  Outcome: Progressing     Problem: ABCDS Injury Assessment  Goal: Absence of physical injury  7/31/2024 1158 by Shayla Rosales RN  Outcome: Progressing  7/30/2024 2220 by Jhoana Gavin RN  Outcome: Progressing     
  Problem: Safety - Adult  Goal: Free from fall injury  7/31/2024 1951 by Jhoana Gavin RN  Outcome: Progressing  7/31/2024 1158 by Shayla Rosales RN  Outcome: Progressing     Problem: Discharge Planning  Goal: Discharge to home or other facility with appropriate resources  7/31/2024 1951 by Jhoana Gavin RN  Outcome: Progressing  7/31/2024 1158 by Shayla Rosales RN  Outcome: Progressing     Problem: Chronic Conditions and Co-morbidities  Goal: Patient's chronic conditions and co-morbidity symptoms are monitored and maintained or improved  7/31/2024 1951 by Jhoana Gavin RN  Outcome: Progressing  7/31/2024 1158 by Shayla Rosales RN  Outcome: Progressing     Problem: ABCDS Injury Assessment  Goal: Absence of physical injury  7/31/2024 1951 by Jhoana Gavin RN  Outcome: Progressing  7/31/2024 1158 by Shayla Rosales RN  Outcome: Progressing     
  Problem: Safety - Adult  Goal: Free from fall injury  8/1/2024 0854 by Shayla Rosales RN  Outcome: Progressing  7/31/2024 1951 by Jhoana Gavin RN  Outcome: Progressing     Problem: Discharge Planning  Goal: Discharge to home or other facility with appropriate resources  8/1/2024 0854 by Shayla Rosales RN  Outcome: Progressing  7/31/2024 1951 by Jhoana Gavin RN  Outcome: Progressing     Problem: Chronic Conditions and Co-morbidities  Goal: Patient's chronic conditions and co-morbidity symptoms are monitored and maintained or improved  8/1/2024 0854 by Shayla Rosales RN  Outcome: Progressing  7/31/2024 1951 by Jhoana Gavin RN  Outcome: Progressing     Problem: ABCDS Injury Assessment  Goal: Absence of physical injury  8/1/2024 0854 by Shayla Rosales RN  Outcome: Progressing  7/31/2024 1951 by Jhoana Gavin RN  Outcome: Progressing     
  Problem: Safety - Adult  Goal: Free from fall injury  8/5/2024 0844 by Nando Cabrera RN  Outcome: Progressing  8/4/2024 2305 by Angelica Doyle RN  Outcome: Progressing     Problem: Discharge Planning  Goal: Discharge to home or other facility with appropriate resources  8/5/2024 0844 by Nando Cabrera RN  Outcome: Progressing  8/4/2024 2305 by Angelica Doyle RN  Outcome: Progressing     Problem: Chronic Conditions and Co-morbidities  Goal: Patient's chronic conditions and co-morbidity symptoms are monitored and maintained or improved  Outcome: Progressing     Problem: ABCDS Injury Assessment  Goal: Absence of physical injury  Outcome: Progressing     Problem: Skin/Tissue Integrity - Adult  Goal: Skin integrity remains intact  Outcome: Progressing     Problem: Musculoskeletal - Adult  Goal: Return mobility to safest level of function  Outcome: Progressing     Problem: Genitourinary - Adult  Goal: Urinary catheter remains patent  Outcome: Progressing     
  Problem: Safety - Adult  Goal: Free from fall injury  8/7/2024 2237 by Juan Francisco Whyte RN  Outcome: Progressing  8/7/2024 1133 by Dee Mendoza LPN  Outcome: Progressing     Problem: Discharge Planning  Goal: Discharge to home or other facility with appropriate resources  8/7/2024 2237 by Juan Francisco Whyte RN  Outcome: Progressing  8/7/2024 1133 by Dee Mendoza LPN  Outcome: Progressing     Problem: Chronic Conditions and Co-morbidities  Goal: Patient's chronic conditions and co-morbidity symptoms are monitored and maintained or improved  8/7/2024 2237 by Juan Francisco Whyte RN  Outcome: Progressing  8/7/2024 1133 by Dee Mendoza LPN  Outcome: Progressing     Problem: ABCDS Injury Assessment  Goal: Absence of physical injury  8/7/2024 2237 by Juan Francisco Whyte RN  Outcome: Progressing  8/7/2024 1133 by Dee Mendoza LPN  Outcome: Progressing     Problem: Skin/Tissue Integrity - Adult  Goal: Skin integrity remains intact  8/7/2024 2237 by Juan Francisco Whyte RN  Outcome: Progressing  8/7/2024 1133 by Dee Mendoza LPN  Outcome: Progressing     Problem: Musculoskeletal - Adult  Goal: Return mobility to safest level of function  8/7/2024 2237 by Juan Francisco Whyte RN  Outcome: Progressing  8/7/2024 1133 by Dee Mendoza LPN  Outcome: Progressing     Problem: Genitourinary - Adult  Goal: Urinary catheter remains patent  8/7/2024 2237 by Juan Francisco Whyte RN  Outcome: Progressing  8/7/2024 1133 by Dee Mendoza LPN  Outcome: Progressing     
  Problem: Safety - Adult  Goal: Free from fall injury  Outcome: Progressing     Problem: Discharge Planning  Goal: Discharge to home or other facility with appropriate resources  Outcome: Progressing     Problem: Chronic Conditions and Co-morbidities  Goal: Patient's chronic conditions and co-morbidity symptoms are monitored and maintained or improved  Outcome: Progressing     
  Problem: Safety - Adult  Goal: Free from fall injury  Outcome: Progressing     Problem: Discharge Planning  Goal: Discharge to home or other facility with appropriate resources  Outcome: Progressing     Problem: Chronic Conditions and Co-morbidities  Goal: Patient's chronic conditions and co-morbidity symptoms are monitored and maintained or improved  Outcome: Progressing     Problem: ABCDS Injury Assessment  Goal: Absence of physical injury  Outcome: Progressing     
  Problem: Safety - Adult  Goal: Free from fall injury  Outcome: Progressing     Problem: Discharge Planning  Goal: Discharge to home or other facility with appropriate resources  Outcome: Progressing     Problem: Chronic Conditions and Co-morbidities  Goal: Patient's chronic conditions and co-morbidity symptoms are monitored and maintained or improved  Outcome: Progressing     Problem: ABCDS Injury Assessment  Goal: Absence of physical injury  Outcome: Progressing     
  Problem: Safety - Adult  Goal: Free from fall injury  Outcome: Progressing     Problem: Discharge Planning  Goal: Discharge to home or other facility with appropriate resources  Outcome: Progressing     Problem: Chronic Conditions and Co-morbidities  Goal: Patient's chronic conditions and co-morbidity symptoms are monitored and maintained or improved  Outcome: Progressing     Problem: ABCDS Injury Assessment  Goal: Absence of physical injury  Outcome: Progressing     Problem: Skin/Tissue Integrity - Adult  Goal: Skin integrity remains intact  Outcome: Progressing     
  Problem: Safety - Adult  Goal: Free from fall injury  Outcome: Progressing     Problem: Discharge Planning  Goal: Discharge to home or other facility with appropriate resources  Outcome: Progressing     Problem: Chronic Conditions and Co-morbidities  Goal: Patient's chronic conditions and co-morbidity symptoms are monitored and maintained or improved  Outcome: Progressing     Problem: ABCDS Injury Assessment  Goal: Absence of physical injury  Outcome: Progressing     Problem: Skin/Tissue Integrity - Adult  Goal: Skin integrity remains intact  Outcome: Progressing     Problem: Musculoskeletal - Adult  Goal: Return mobility to safest level of function  Outcome: Progressing     Problem: Genitourinary - Adult  Goal: Urinary catheter remains patent  Outcome: Progressing     
  Problem: Safety - Adult  Goal: Free from fall injury  Outcome: Progressing     Problem: Discharge Planning  Goal: Discharge to home or other facility with appropriate resources  Outcome: Progressing  Flowsheets (Taken 8/10/2024 0744)  Discharge to home or other facility with appropriate resources: Identify barriers to discharge with patient and caregiver     Problem: Chronic Conditions and Co-morbidities  Goal: Patient's chronic conditions and co-morbidity symptoms are monitored and maintained or improved  Outcome: Progressing  Flowsheets (Taken 8/10/2024 0744)  Care Plan - Patient's Chronic Conditions and Co-Morbidity Symptoms are Monitored and Maintained or Improved: Monitor and assess patient's chronic conditions and comorbid symptoms for stability, deterioration, or improvement     Problem: ABCDS Injury Assessment  Goal: Absence of physical injury  Outcome: Progressing     Problem: Skin/Tissue Integrity - Adult  Goal: Skin integrity remains intact  Outcome: Progressing  Flowsheets (Taken 8/10/2024 0744)  Skin Integrity Remains Intact: Monitor for areas of redness and/or skin breakdown     Problem: Musculoskeletal - Adult  Goal: Return mobility to safest level of function  Outcome: Progressing     Problem: Genitourinary - Adult  Goal: Urinary catheter remains patent  Outcome: Progressing  Flowsheets (Taken 8/10/2024 0744)  Urinary catheter remains patent: Assess patency of urinary catheter     
much HELP from another person do you currently need... (If the patient hasn't done an activity recently, how much help from another person do you think they would need if they tried?) Total A Lot A Little None   1.  Turning from your back to your side while in a flat bed without using bedrails? []  1 []  2 []  3  [x]  4   2.  Moving from lying on your back to sitting on the side of a flat bed without using bedrails? []  1 []  2 []  3  [x]  4   3.  Moving to and from a bed to a chair (including a wheelchair)? []  1 []  2 []  3  [x]  4   4. Standing up from a chair using your arms (e.g. wheelchair or bedside chair)? []  1 []  2 []  3  [x]  4   5.  Walking in hospital room? []  1 []  2 [x]  3  []  4   6.  Climbing 3-5 steps with a railing? []  1 []  2 [x]  3  []  4     Raw Score: 22/24                            Cutoff score ?171,2,3 had higher odds of discharging home with home health or need of SNF/IPR.    1. Emily Corona, Vandana Flowers, Warner Liu, Syeda Jessica, Jorge Rivas, José Luis Corona.  Validity of the AM-PAC “6-Clicks” Inpatient Daily Activity and Basic Mobility Short Forms. Physical Therapy Mar 2014, [phone]; DOI: 10.2522/[id number]  2. Nahid ROSALES, Elfego J, Kalee J, Alexander BARR. Association of AM-PAC \"6-Clicks\" Basic Mobility and Daily Activity Scores With Discharge Destination. Phys Ther. 2021 Apr 4;101(4):nbtj581. doi: 10.1093/ptj/[email or web address]. PMID: 27183053.  3. Will BARR, Barry BROWN, Irma S, Dalila K, Ruby S. Activity Measure for Post-Acute Care \"6-Clicks\" Basic Mobility Scores Predict Discharge Destination After Acute Care Hospitalization in Select Patient Groups: A Retrospective, Observational Study. Arch Rehabil Res Clin Transl. 2022 Jul 16;4(3):968686. doi: [email or web address]/j.[id number]. PMID: 59487189; PMCID: [id number].  4. Cj GUERIN, Li S, Vivi W, Shae P. AM-PAC Short Forms Manual 4.0. Revised 2/2020.

## 2024-08-10 NOTE — PROGRESS NOTES
Joshua James  [facility]ist Group                                                                               Hospitalist Progress Note  Vidal Toney MD          Date of Service:  2024  NAME:  Steve Martinez  :  1970  MRN:  [id number]    Please note that this dictation was completed with [employer], the computer voice recognition software.  Quite often unanticipated grammatical, syntax, homophones, and other interpretive errors are inadvertently transcribed by the computer software.  Please disregard these errors.  Please excuse any errors that have escaped final proofreading.    Admission Summary:   Steve Martinez is a 54-year-old male past medical history of type 1 diabetes with neuropathy, DKA presents to the ED due to generalized weakness.  Prior to arrival to [facility] patient was feeling weak for over a week with decreased appetite.  Through further workup it was found patient had DKA, UTI, and MSSA found the blood.  TTE showed possible vegetations however this was not seen in VÍCTOR.  ID on board and recommended 6 weeks of IV cefazolin with a stop date of 2024.  Hospital stay was complicated by urinary incontinence, left pyelonephritis seen on CT imaging as well as bilateral hydronephrosis.  Due to urinary tension patient was placed on a Christiansen catheter recommended to complete voiding trials.  Patient was admitted to to [facility] for additional antibiotic coverage.     Currently the patient denies any headache, sore throat, trouble swallowing, trouble with speech, chest pain, SOB, cough, fever, chills, N/V/D, abd pain, urinary symptoms, constipation, recent travels, sick contacts, focal or generalized neurological symptoms.       Interval history / Subjective:   Patient is pleasant and interactive.  Uneventful night.      No headache, fever, chills, chest pain, dyspnea, cough, abdominal pain.     Assessment & Plan:     Anticipated 
      Joshua James  [facility]ist [facility]                                                                               Hospitalist Progress Note  Debbie Gilbert MD          Date of Service:  2024  NAME:  Steve Martinez  :  1970  MRN:  [id number]    Please note that this dictation was completed with [employer], the computer voice recognition software.  Quite often unanticipated grammatical, syntax, homophones, and other interpretive errors are inadvertently transcribed by the computer software.  Please disregard these errors.  Please excuse any errors that have escaped final proofreading.    Admission Summary:   Steve Martinez is a 54-year-old male past medical history of type 1 diabetes with neuropathy, DKA presents to the ED due to generalized weakness.  Prior to arrival to [facility] patient was feeling weak for over a week with decreased appetite.  Through further workup it was found patient had DKA, UTI, and MSSA found the blood.  TTE showed possible vegetations however this was not seen in VÍCTOR.  ID on board and recommended 6 weeks of IV cefazolin with a stop date of 2024.  Hospital stay was complicated by urinary incontinence, left pyelonephritis seen on CT imaging as well as bilateral hydronephrosis.  Due to urinary tension patient was placed on a Christiansen catheter recommended to complete voiding trials.  Patient was admitted to to [facility] for additional antibiotic coverage.     Currently the patient denies any headache, sore throat, trouble swallowing, trouble with speech, chest pain, SOB, cough, fever, chills, N/V/D, abd pain, urinary symptoms, constipation, recent travels, sick contacts, focal or generalized neurological symptoms.       Interval history / Subjective:   Uneventful night.  No headache, fever, chills, chest pain, dyspnea, cough, abdominal pain.     Assessment & Plan:     Anticipated discharge date : 2024  Anticipated 
      Joshua James  [facility] Hospitalist Group                                                                               Hospitalist Progress Note  Vidal Toney MD          Date of Service:  2024  NAME:  Steve Martinez  :  1970  MRN:  [id number]    Please note that this dictation was completed with [employer], the computer voice recognition software.  Quite often unanticipated grammatical, syntax, homophones, and other interpretive errors are inadvertently transcribed by the computer software.  Please disregard these errors.  Please excuse any errors that have escaped final proofreading.    Admission Summary:   Steve Martinez is a 54-year-old male past medical history of type 1 diabetes with neuropathy, DKA presents to the ED due to generalized weakness.  Prior to arrival to [facility] patient was feeling weak for over a week with decreased appetite.  Through further workup it was found patient had DKA, UTI, and MSSA found the blood.  TTE showed possible vegetations however this was not seen in VÍCTOR.  ID on board and recommended 6 weeks of IV cefazolin with a stop date of 2024.  Hospital stay was complicated by urinary incontinence, left pyelonephritis seen on CT imaging as well as bilateral hydronephrosis.  Due to urinary tension patient was placed on a Mendes catheter recommended to complete voiding trials.  Patient was admitted to to [facility] for additional antibiotic coverage.     Currently the patient denies any headache, sore throat, trouble swallowing, trouble with speech, chest pain, SOB, cough, fever, chills, N/V/D, abd pain, urinary symptoms, constipation, recent travels, sick contacts, focal or generalized neurological symptoms.       Interval history / Subjective:   Had a discussion with patient regarding long term mendes catheter use. Patient agreed to move forward with straight cathing. Overnight will restart mendes catheter.   Updated 
      Joshua James  [facility] Hospitalist Group                                                                               Hospitalist Progress Note  Debbie Gilbert MD          Date of Service:  2024  NAME:  Steve Martinez  :  1970  MRN:  [id number]    Please note that this dictation was completed with [employer], the computer voice recognition software.  Quite often unanticipated grammatical, syntax, homophones, and other interpretive errors are inadvertently transcribed by the computer software.  Please disregard these errors.  Please excuse any errors that have escaped final proofreading.    Admission Summary:   Steve Martinez is a 54-year-old male past medical history of type 1 diabetes with neuropathy, DKA presents to the ED due to generalized weakness.  Prior to arrival to [facility] patient was feeling weak for over a week with decreased appetite.  Through further workup it was found patient had DKA, UTI, and MSSA found the blood.  TTE showed possible vegetations however this was not seen in VÍCTOR.  ID on board and recommended 6 weeks of IV cefazolin with a stop date of 2024.  Hospital stay was complicated by urinary incontinence, left pyelonephritis seen on CT imaging as well as bilateral hydronephrosis.  Due to urinary tension patient was placed on a Christiansen catheter recommended to complete voiding trials.  Patient was admitted to to [facility] for additional antibiotic coverage.     Currently the patient denies any headache, sore throat, trouble swallowing, trouble with speech, chest pain, SOB, cough, fever, chills, N/V/D, abd pain, urinary symptoms, constipation, recent travels, sick contacts, focal or generalized neurological symptoms.       Interval history / Subjective:   Patient denies any new complaints.  Uneventful night.  No headache, fever, chills, chest pain, dyspnea, cough, abdominal pain.     Assessment & Plan:     Anticipated discharge 
      Joshua James  [facility] Hospitalist Group                                                                               Hospitalist Progress Note  Vidal Toney MD          Date of Service:  2024  NAME:  Steve Martinez  :  1970  MRN:  [id number]    Please note that this dictation was completed with [employer], the computer voice recognition software.  Quite often unanticipated grammatical, syntax, homophones, and other interpretive errors are inadvertently transcribed by the computer software.  Please disregard these errors.  Please excuse any errors that have escaped final proofreading.    Admission Summary:   Steve Martinez is a 54-year-old male past medical history of type 1 diabetes with neuropathy, DKA presents to the ED due to generalized weakness.  Prior to arrival to [facility] patient was feeling weak for over a week with decreased appetite.  Through further workup it was found patient had DKA, UTI, and MSSA found the blood.  TTE showed possible vegetations however this was not seen in VÍCTOR.  ID on board and recommended 6 weeks of IV cefazolin with a stop date of 2024.  Hospital stay was complicated by urinary incontinence, left pyelonephritis seen on CT imaging as well as bilateral hydronephrosis.  Due to urinary tension patient was placed on a Christiansen catheter recommended to complete voiding trials.  Patient was admitted to to [facility] for additional antibiotic coverage.     Currently the patient denies any headache, sore throat, trouble swallowing, trouble with speech, chest pain, SOB, cough, fever, chills, N/V/D, abd pain, urinary symptoms, constipation, recent travels, sick contacts, focal or generalized neurological symptoms.       Interval history / Subjective:   Patient soft stool, no diarrhea, had one episode thus far. Discussed the pro/cons of voiding trails. Patient stated he will think about it and get back to writer next day.     No 
      Joshua James  [facility] Hospitalist Group                                                                               Hospitalist Progress Note  iVdal Toney MD          Date of Service:  2024  NAME:  Steve Martinez  :  1970  MRN:  [id number]    Please note that this dictation was completed with [employer], the computer voice recognition software.  Quite often unanticipated grammatical, syntax, homophones, and other interpretive errors are inadvertently transcribed by the computer software.  Please disregard these errors.  Please excuse any errors that have escaped final proofreading.    Admission Summary:   Steve Martinez is a 54-year-old male past medical history of type 1 diabetes with neuropathy, DKA presents to the ED due to generalized weakness.  Prior to arrival to [facility] patient was feeling weak for over a week with decreased appetite.  Through further workup it was found patient had DKA, UTI, and MSSA found the blood.  TTE showed possible vegetations however this was not seen in VÍCTOR.  ID on board and recommended 6 weeks of IV cefazolin with a stop date of 2024.  Hospital stay was complicated by urinary incontinence, left pyelonephritis seen on CT imaging as well as bilateral hydronephrosis.  Due to urinary tension patient was placed on a Christiansen catheter recommended to complete voiding trials.  Patient was admitted to to [facility] for additional antibiotic coverage.     Currently the patient denies any headache, sore throat, trouble swallowing, trouble with speech, chest pain, SOB, cough, fever, chills, N/V/D, abd pain, urinary symptoms, constipation, recent travels, sick contacts, focal or generalized neurological symptoms.       Interval history / Subjective:   Had a discussion with patient about diabetic regimen and insulin dosing.     No headache, fever, chills, chest pain, dyspnea, cough, abdominal pain.        Assessment & Plan: 
      Joshua James  [facility] Hospitalist Group                                                                               Hospitalist Progress Note  Debbie Gilbert MD          Date of Service:  2024  NAME:  Steve Martinez  :  1970  MRN:  [id number]    Please note that this dictation was completed with [employer], the computer voice recognition software.  Quite often unanticipated grammatical, syntax, homophones, and other interpretive errors are inadvertently transcribed by the computer software.  Please disregard these errors.  Please excuse any errors that have escaped final proofreading.    Admission Summary:   Steve Martinez is a 54-year-old male past medical history of type 1 diabetes with neuropathy, DKA presents to the ED due to generalized weakness.  Prior to arrival to [facility] patient was feeling weak for over a week with decreased appetite.  Through further workup it was found patient had DKA, UTI, and MSSA found the blood.  TTE showed possible vegetations however this was not seen in VÍCTOR.  ID on board and recommended 6 weeks of IV cefazolin with a stop date of 2024.  Hospital stay was complicated by urinary incontinence, left pyelonephritis seen on CT imaging as well as bilateral hydronephrosis.  Due to urinary tension patient was placed on a Christiansen catheter recommended to complete voiding trials.  Patient was admitted to to [facility] for additional antibiotic coverage.     Currently the patient denies any headache, sore throat, trouble swallowing, trouble with speech, chest pain, SOB, cough, fever, chills, N/V/D, abd pain, urinary symptoms, constipation, recent travels, sick contacts, focal or generalized neurological symptoms.       Interval history / Subjective:   Uneventful night.  Patient is overall doing well.  No headache, fever, chills, chest pain, dyspnea, cough, abdominal pain.     Assessment & Plan:     Anticipated discharge date : 
      Joshua James  [facility] Hospitalist Group                                                                               Hospitalist Progress Note  Vidal Toney MD          Date of Service:  2024  NAME:  Steve Martinez  :  1970  MRN:  [id number]    Please note that this dictation was completed with [employer], the computer voice recognition software.  Quite often unanticipated grammatical, syntax, homophones, and other interpretive errors are inadvertently transcribed by the computer software.  Please disregard these errors.  Please excuse any errors that have escaped final proofreading.    Admission Summary:   Steve Martinez is a 54-year-old male past medical history of type 1 diabetes with neuropathy, DKA presents to the ED due to generalized weakness.  Prior to arrival to [facility] patient was feeling weak for over a week with decreased appetite.  Through further workup it was found patient had DKA, UTI, and MSSA found the blood.  TTE showed possible vegetations however this was not seen in VÍCTOR.  ID on board and recommended 6 weeks of IV cefazolin with a stop date of 2024.  Hospital stay was complicated by urinary incontinence, left pyelonephritis seen on CT imaging as well as bilateral hydronephrosis.  Due to urinary tension patient was placed on a Christiansen catheter recommended to complete voiding trials.  Patient was admitted to to [facility] for additional antibiotic coverage.     Currently the patient denies any headache, sore throat, trouble swallowing, trouble with speech, chest pain, SOB, cough, fever, chills, N/V/D, abd pain, urinary symptoms, constipation, recent travels, sick contacts, focal or generalized neurological symptoms.       Interval history / Subjective:   Patient is pleasant and interactive.  Uneventful night.      No headache, fever, chills, chest pain, dyspnea, cough, abdominal pain.     Assessment & Plan:     Anticipated 
      Joshua James  [facility] Hospitalist Group                                                                               Hospitalist Progress Note  Debbie Gilbert MD          Date of Service:  2024  NAME:  Steve Martinez  :  1970  MRN:  [id number]    Please note that this dictation was completed with [employer], the computer voice recognition software.  Quite often unanticipated grammatical, syntax, homophones, and other interpretive errors are inadvertently transcribed by the computer software.  Please disregard these errors.  Please excuse any errors that have escaped final proofreading.    Admission Summary:   Steve Martinez is a 54-year-old male past medical history of type 1 diabetes with neuropathy, DKA presents to the ED due to generalized weakness.  Prior to arrival to [facility] patient was feeling weak for over a week with decreased appetite.  Through further workup it was found patient had DKA, UTI, and MSSA found the blood.  TTE showed possible vegetations however this was not seen in VÍCTOR.  ID on board and recommended 6 weeks of IV cefazolin with a stop date of 2024.  Hospital stay was complicated by urinary incontinence, left pyelonephritis seen on CT imaging as well as bilateral hydronephrosis.  Due to urinary tension patient was placed on a Christiansen catheter recommended to complete voiding trials.  Patient was admitted to to [facility] for additional antibiotic coverage.     Currently the patient denies any headache, sore throat, trouble swallowing, trouble with speech, chest pain, SOB, cough, fever, chills, N/V/D, abd pain, urinary symptoms, constipation, recent travels, sick contacts, focal or generalized neurological symptoms.       Interval history / Subjective:   Patient did not have any significant event overnight.  Diarrhea is better. At request of pt, care was discussed in detail with pt/sister, at bedside.  No headache, fever, chills, 
      Joshua James  [facility] Hospitalist Group                                                                               Hospitalist Progress Note  Vidal Toney MD          Date of Service:  2024  NAME:  Steve Martinez  :  1970  MRN:  [id number]    Please note that this dictation was completed with [employer], the computer voice recognition software.  Quite often unanticipated grammatical, syntax, homophones, and other interpretive errors are inadvertently transcribed by the computer software.  Please disregard these errors.  Please excuse any errors that have escaped final proofreading.    Admission Summary:   Steve Martinez is a 54-year-old male past medical history of type 1 diabetes with neuropathy, DKA presents to the ED due to generalized weakness.  Prior to arrival to [facility] patient was feeling weak for over a week with decreased appetite.  Through further workup it was found patient had DKA, UTI, and MSSA found the blood.  TTE showed possible vegetations however this was not seen in VÍCTOR.  ID on board and recommended 6 weeks of IV cefazolin with a stop date of 2024.  Hospital stay was complicated by urinary incontinence, left pyelonephritis seen on CT imaging as well as bilateral hydronephrosis.  Due to urinary tension patient was placed on a Mendes catheter recommended to complete voiding trials.  Patient was admitted to to [facility] for additional antibiotic coverage.     Currently the patient denies any headache, sore throat, trouble swallowing, trouble with speech, chest pain, SOB, cough, fever, chills, N/V/D, abd pain, urinary symptoms, constipation, recent travels, sick contacts, focal or generalized neurological symptoms.       Interval history / Subjective:   Had a discussion on the importance of removing the mendes catheter, the deleterious effects of chronic mendes catheter complications. Patient expressed understanding and agreed for 
      Joshua James  [facility] Hospitalist Group                                                                               Hospitalist Progress Note  Vidal Toney MD          Date of Service:  2024  NAME:  Steve Martinez  :  1970  MRN:  [id number]    Please note that this dictation was completed with [employer], the computer voice recognition software.  Quite often unanticipated grammatical, syntax, homophones, and other interpretive errors are inadvertently transcribed by the computer software.  Please disregard these errors.  Please excuse any errors that have escaped final proofreading.    Admission Summary:   Steve Martinez is a 54-year-old male past medical history of type 1 diabetes with neuropathy, DKA presents to the ED due to generalized weakness.  Prior to arrival to [facility] patient was feeling weak for over a week with decreased appetite.  Through further workup it was found patient had DKA, UTI, and MSSA found the blood.  TTE showed possible vegetations however this was not seen in VÍCTOR.  ID on board and recommended 6 weeks of IV cefazolin with a stop date of 2024.  Hospital stay was complicated by urinary incontinence, left pyelonephritis seen on CT imaging as well as bilateral hydronephrosis.  Due to urinary tension patient was placed on a Christiansen catheter recommended to complete voiding trials.  Patient was admitted to to [facility] for additional antibiotic coverage.     Currently the patient denies any headache, sore throat, trouble swallowing, trouble with speech, chest pain, SOB, cough, fever, chills, N/V/D, abd pain, urinary symptoms, constipation, recent travels, sick contacts, focal or generalized neurological symptoms.       Interval history / Subjective:   Discussed with patient on dieting, eating, increasing fluid intake, update on kidney markers.     No headache, fever, chills, chest pain, dyspnea, cough, abdominal pain.    
      Joshua James  [facility] Hospitalist Group                                                                               Hospitalist Progress Note  Debbie Gilbert MD          Date of Service:  8/3/2024  NAME:  Steve Martinez  :  1970  MRN:  [id number]    Please note that this dictation was completed with [employer], the computer voice recognition software.  Quite often unanticipated grammatical, syntax, homophones, and other interpretive errors are inadvertently transcribed by the computer software.  Please disregard these errors.  Please excuse any errors that have escaped final proofreading.    Admission Summary:   Steve Martinez is a 54-year-old male past medical history of type 1 diabetes with neuropathy, DKA presents to the ED due to generalized weakness.  Prior to arrival to [facility] patient was feeling weak for over a week with decreased appetite.  Through further workup it was found patient had DKA, UTI, and MSSA found the blood.  TTE showed possible vegetations however this was not seen in VÍCTOR.  ID on board and recommended 6 weeks of IV cefazolin with a stop date of 2024.  Hospital stay was complicated by urinary incontinence, left pyelonephritis seen on CT imaging as well as bilateral hydronephrosis.  Due to urinary tension patient was placed on a Christiansen catheter recommended to complete voiding trials.  Patient was admitted to to [facility] for additional antibiotic coverage.     Currently the patient denies any headache, sore throat, trouble swallowing, trouble with speech, chest pain, SOB, cough, fever, chills, N/V/D, abd pain, urinary symptoms, constipation, recent travels, sick contacts, focal or generalized neurological symptoms.       Interval history / Subjective:   Patient patient did not have any acute event overnight.  Not happy with the breakfast he was given today.  No headache, fever, chills, chest pain, dyspnea, cough, abdominal pain. 
  Problem: Physical Therapy - Adult  Goal: By Discharge: Performs mobility at highest level of function for planned discharge setting.  See evaluation for individualized goals.  Description: FUNCTIONAL STATUS PRIOR TO ADMISSION: Patient was independent and active without use of DME.    HOME SUPPORT PRIOR TO ADMISSION: The patient lived alone with no local support.    Physical Therapy Goals  Initiated 7/23/2024  1.  Patient will move from supine to sit and sit to supine  in bed with independence within 7 day(s).    2.  Patient will transfer from bed to chair and chair to bed with independence using the least restrictive device within 7 day(s).  3.  Patient will perform sit to stand with independence within 7 day(s).  4.  Patient will ambulate with independence for 300 feet with the least restrictive device within 7 day(s).   5.  Patient will ascend/descend 3 stairs without handrail(s) with independence within 7 day(s).    Outcome: Progressing   PHYSICAL THERAPY TREATMENT    Patient: Steve Martinez (54 y.o. male)  Date: 7/24/2024  Diagnosis: MSSA bacteremia [R78.81, B95.61] MSSA bacteremia      Precautions:                        ASSESSMENT:  Patient continues to benefit from skilled PT services and is progressing towards goals. Patient's BP was much more stable with changes in position and patient was able to slowly ambulate 250ft without use of assistive device and stand-by assist from PT with minimal cues for safety and energy conservation. Patient able to safely perform multiple sit to stands without need for assistance or cues and reported exercise program was helping to improve LE strength.      07/24/24 1515   Vitals   Blood Pressure Sitting 113/63   Pulse Sitting 82 PER MINUTE   Blood Pressure Standing 108/64   Pulse Standing 81 PER MINUTE          PLAN:  Patient continues to benefit from skilled intervention to address the above impairments.  Continue treatment per established plan of care.    Recommend 
0415- Morning labs draw and send to labs.  Patient sleep well at night. No any overnight issue.  
0500- Morning labs draw and send to labs.  Patient sleep well at night.  
0530-Morning labs draw and send to labs. Patient sleep well at night. No any overnight issue.    1353- Message send to on call provider Dr. Gilbert    Patient admitted MSSA Bacteremia. Hx of DM and Urinary retension. Notifying you of this morning sodium level 123. Patient is currently receiving LR @ 100ml/hr.Also, WBC are trending up from 14.3 to 15.5. Patient is in [facility]. Please advise.    
0706) Bedside and verbal shift report given to Shayla RN (oncoming nurse) from Jhoana RN (offgoing nurse).   
0710: Bedside and verbal shift change report given to Shayla PAZ (oncoming nurse) by Jhoana PAZ   (offgoing nurse). Report included the following information: Nurse Handoff Report.      1120: Patient complained of shakiness and jitteriness; given carbohydrate followed by blood glucose result of 85. MD notified    1125: Verbal order received from MD to withhold afternoon insulin dose    1910: Verbal report given to Jhoana PAZ  
0715 -- Bedside shift change report given to BRITTANY Menon (oncoming nurse) by JACKSON Ro (offgoing nurse). Report included the following information Nurse Handoff Report, Adult Overview, and Intake/Output.     0824 -- Pt's sugar when checked this morning is 164. He has orders for 8 units in addition to  sliding scale. Sliding scale will be held and message to MD to determine how much insulin to give.     0904 -- Pt given 4 units of Humalog per MD.    1154 -- Pt's lunch time sugar is 147, message to MD to determine how much insulin to give. Pt currently eating lunch - pot roast, mashed potatoes and broccoli.     1218 -- Pt given 4 units Humalog per MD order.     1642 -- Pt's dinner sugar is 261, MD notified and confirmed 10 units of Humalog to be given. Dinner trays were delivered however pt does not currently want to eat. Will re-check sugar when pt ready to eat dinner.     1815 -- Pt's sugar re-checked and reading remains 261. Scheduled meds given.     
0715 Bedside and Verbal shift change report given to BRITTANY Andrews (oncoming nurse) by JACKSON Granados (offgoing nurse). Report included the following information Nurse Handoff Report, Intake/Output, and MAR.     0859 Scheduled meds admin. 2 units SS Humalog admin.    1104 Scheduled abx administered    1230 SS Humalog held. Scheduled meds administered.    1443 Scheduled meds admin               
0720: Bedside, Verbal, and Face-face shift change report given to JACKSON Mcgee (oncoming nurse) by Juan Francisco(offgoing nurse). Report included the following information Nurse Handoff Report, Adult Overview, MAR, and Recent Results.     0846: OT at bedside.     1211: BMP lab ordered by Provider.     1340: Provider spoke with Pt and Pt sister in presence of this RN. Provider stated that he will call Urology to schedule an earlier appointment for Pt. Provider advised Pt that, per notes, Urology will be in a position to change Pt indwelling catheter, per past difficulties with insertion. Pt and sister were satisfied with Provider recommendation.     1454: Pt and sister educated on indwelling catheter home care, by this RN and JACKSON Chacon CCL. Relevant care steps and processes where explained and demonstrated. Pt and sister were given needed supplies and endorsed understanding of teaching.     1730: This RN attempted to obtain urine from Pt with JACKSON Chacon. This RN and JACKSON Chacon were unsuccessful. This RN will attempt again in 30mins.     1920: Handoff report given to Oncoming nurse JACKSON Phan This RN notified Oncoming nurse JACKSON Granados that per Provider orders, Pt fluids was not to be administered until Pt urine labs were collected. JACKSON Granados acknowledged understanding. This RN gave lab tubes and labels to JACKSON Granados.      
0824 -- Pt's AM glucose is 103, Humalog 8 units held per MD.     1625 -- Dinner trays are being delivered, pt does not wish to eat dinner this early. Informed pt will wait to check his sugar until about 1730 and can give insulin at that time if needed. Pt advised to call if he would like to eat dinner prior to then. He voiced understanding.   
1523) Message to Dr. Toney, pt asking about immodium. Pt stated 5 bowel movements today that were somewhat loose.   1643) Message to Dr. Toney, what parameters would you like for midodrine, /81 [reply hold 160/100]  1656) Message to Akanksha Puente, infection control nurse to discuss pt diarrhea.  Only 2 loose stool reported after soft bowel movements.  Pt not report abdomen pain. No nausea or vomiting. Message sent to Dr. Toney  
18:10- Patient being discharged this evening. Waiting for sister to pick him up. AVS paperwork thoroughly reviewed with patient and all questions answered. Medications and appointments reviewed. Instructed patient on how to use the leg bag for indwelling mendes catheter. Patient verbalized understanding and demonstrated understanding. (Patient and nurse connected leg bag.) IV removed. All belongings packed up and ready to go when sister arrives.   
1900- Reports received from day nurse JACKSON Mcgee.    2035-Urine collected and send to labs.    0515- Morning labs draw and sends to lab.      
1905- Bedside report received from day staff, JACKSON Euceda.  2000- B/S 105.     2050- Pt called saying he is not feeling good.  Went to pt room. Pt sleeping at bed. Alert & oriented. His hand is shivering. Check his blood sugar. B/S was 63 at first and 61 second time.  Gave him orange juice mixed with sugar and peanut sandwich. Also, give him 10 % dextrose according to protocol. Check B/S after 15 min. B/S 130 after 15 min.   Message on call provider Dr. Gilbert.  Hold lantus as per on call provider.    0145-Message to on call provider Dr. Gilbert regarding B\S high.Patient B\S 420 at first and 440 second time. Get 8 unit lispro order.    0457- B\S 216    0445- Morning labs draw and send to labs.  
1905- Patient left with his sister. Nurse Mona, assist him with wheelchair to ride.  
1915: Bedside change of shift report received from JACKSON Mcguire and JACKSON Briseno. Pt is awake in chair with visitors present. He has no complaints at this time.    0710: Bedside change of shift report given to JACKSON Alvarado. Pt is awake in bed in no acute distress.  
1920 -- Bedside shift change report given to BRITTANY Menon & JACKSON Granados (orientee) (oncoming nurse) by JACKSON Villanueva (offgoing nurse). Report included the following information Nurse Handoff Report, Adult Overview, Intake/Output, and Event Log. Christiansen was removed at 1825, pt has not yet voided.     2245 -- Pt notified nursing staff that he feels the need to void however has not been able to. Bladder scan done showing 561 mL of urine currently in bladder. Straight cath attempted per orders however continued to meet resistance, pt also reporting pain. Will try again using Coude cath.    0010 -- Straight cath done per orders using Coude cath. There was slight resistance when inserting catheter. Total urine drained was 850 mL of clear yellow urine. There was a small amount of blood noted on the tip of the catheter when it was removed.     0430 -- Pt has not yet voided since straight cath was done and urged to attempt to void. He was able to void 120 mL on his own. Bladder scan done and showing 657 mL of urine still in bladder. Recommended to pt to try to get up and go to the bathroom to try to void.     0546 -- Pt was able to void additional 100 mL of urine. It was also noted that pt's rodo pad and gown were wet. Bladder scan done again and pt now has 597 mL still in bladder. Message sent to MD per orders.      0620 -- Second straight cath done per orders using Coude cath. Drained 750 mL of clear light yellow urine from bladder. Post void residual on bladder scanner was 64 mL.         
1920-Bedside and Verbal shift change report given to JACKSON Ro (oncoming nurse) by JACKSON Villa (offgoing nurse). Report included the following information Nurse Handoff Report, Intake/Output, MAR, and Recent Results.    
1923 Bedside and Verbal shift change report given to JACKSON Dominguez (oncoming nurse) by BRITTANY Andrews (offgoing nurse). Report included the following information Nurse Handoff Report, Intake/Output, MAR, and Recent Results.      0650 Patient had a calm shift, V/S checked and WNR for patient. Scheduled meds. Served, I/O recording done. No safety events overnight.     0751 Shift change report given to JACKSON Collier      
2022) Message to Dr. Toney. (Can pt have double portions (of at least protein)? Family was giving him extra sandwiches)  2201) Message to Dr. Lund, pt admitted with mendes.  Can we have an order for urine culture and lidocaine urojet for replacement of mendes.   
2230- 16 F Coude mendes placed by JACKSON Gaming, with assistance from JACKSON Granados minimal resistance met, pt tolerated. Pt cleaned with CHG wipes prior to insertion, sterile field maintained.  
8331) Discuss with Dr. Toney, pt able to go outside with staff member  
Bedside and Verbal shift change report given to BRITTANY Price (oncoming nurse) by JACKSON Ro (offgoing nurse). Report included the following information Nurse Handoff Report, Intake/Output, MAR, and Recent Results.    
Bedside and Verbal shift change report given to BRITTANY Price (oncoming nurse) by JACKSON Ro (offgoing nurse). Report included the following information Nurse Handoff Report, Intake/Output, MAR, and Recent Results. Patient had an uneventful night.  
Bedside and Verbal shift change report given to JACKSON Ross (oncoming nurse) by JACKSON Ro (offgoing nurse). Report included the following information Nurse Handoff Report, Intake/Output, MAR, and Recent Results.  Patient had an uneventful shift.  
Bedside and Verbal shift change report given to JACKSON Ross (oncoming nurse) by JACKSON Ro (offgoing nurse). Report included the following information Nurse Handoff Report, Intake/Output, MAR, and Recent Results. Patient had an uneventful night.  
Bedside and Verbal shift change report given to JACKSON Villanueva (oncoming nurse) by JACKSON Ro (offgoing nurse). Report included the following information Nurse Handoff Report, Index, Intake/Output, MAR, and Recent Results.    
Bedside shift change report given to JACKSON Banuelos (oncoming nurse) by BRITTANY Price (offgoing nurse). Report included the following information Nurse Handoff Report, Intake/Output, MAR, Recent Results, Med Rec Status, and Neuro Assessment.     Patient discussed on IDR rounds. Patient's BP WNL this AM and midodrine held. Patient Patient expressed dizziness and positive orthostatics at 1030. Midodrine OK to give per Dr. Toney, see MAR. Patient continues with urinary retention. Straight cath completed at 1500 with RN x2, urojet administered per MD order. 700 ml urine received and patient tolerated well.     Pharmacy notified lantus is unavailable in pyxis. Pharmacy to re-stock for evening dose.    Bedside shift change report given to JACKSON Rogel, and JACKSON Granados (oncoming nurse) by JACKSON Banuelos (offgoing nurse). Report included the following information Nurse Handoff Report, Intake/Output, MAR, Recent Results, Med Rec Status, and Neuro Assessment.       
Bedside shift change report given to JACKSON Rogel (oncoming nurse) by JACKSON Valle (offgoing nurse). Report included the following information Nurse Handoff Report, Intake/Output, MAR, and Recent Results.     Pt transferred from [facility]. Pt came to [facility] with mendes and PICC line. Blood cultures completed, PICC line dressing changed and mendes changed with provider's orders. Pt was cooperative with admission process. CHG bath performed, no complaints of pain. Pt does endorse feeling tingling and numbness in bilateral hands and feet. Scheduled medication given. Pt cooperative with care. RN rounds in place, plan of care to continue.  
Comprehensive Nutrition Assessment     Type and Reason for Visit:  Follow Up     Nutrition Recommendations/Plan:   ETC, 3 Carb Choice NCS diet order  Provide Glucerna TID (660 kcal, 78 g carbs, 30 g protein)  May need to consider enteral support - NGT inpatient if PO <50% vs PEG for long term support (severely malnourished, unable to meet nutritional needs by mouth)  High risk for refeeding-  monitor K, Phos, Mg until stable x 3 days with nutrition, replace PRN  *If refeeding noted: MVI x 10 days, thiamine 100 mg x 10 days       Malnutrition Assessment:  Malnutrition Status:  Severe malnutrition (07/21/24 1235)    Context:  Chronic Illness     Findings of the 6 clinical characteristics of malnutrition:  Energy Intake:  75% decrease in energy intake; no po intake for > 4 days PTA at previous facility.  Weight Loss:  No significant recent weight loss.     Body Fat Loss:  Severe body fat loss Fat Overlying Ribs, Triceps, Orbital   Muscle Mass Loss:  Severe muscle mass loss Temples (temporalis), Clavicles (pectoralis & deltoids), Thigh (quadriceps), Calf (gastrocnemius)  Fluid Accumulation:  No significant fluid accumulation     Strength:  Not Performed     Nutrition Assessment:     Patient is a 53 year old male admitted as a transfer for abx treatment until 8/8/24 for MSSA bacteremia likely 2' head wound; was admitted to previous facility with Urinary retention [R33.9]  Hyponatremia [E87.1]  Metabolic acidosis [E87.20]  Septicemia (HCC) [A41.9] resolved  Failure to thrive in adult [R62.7]  AMOS (acute kidney injury) (HCC) [N17.9]  Ketoacidosis, diabetic, type 2, no coma (HCC) [E11.10]  Diabetic ketoacidosis without coma associated with type 1 diabetes mellitus (HCC) [E10.10] (resolved). He  has a past medical history of History of diabetes insipidus, History of weight loss, Neuropathy, and Uncontrolled type 1 diabetes mellitus with hyperglycemia (HCC).  RD consulted; patient was on insulin drip at previous facility, 
Hand off received received from JACKSON Smith.  Patient comfortably sitting in chair. Actively participate during report. No any overnight issue. Patient sleep well at night.  
Hand off report received JACKSON carrillo. Patient comfortably sitting in chair during reports. Actively participant during report.    Patient sleep well at night. No any overnight issue.  
OCCUPATIONAL THERAPY EVALUATION/DISCHARGE  Patient: Steve Martinez (54 y.o. male)  Date: 7/22/2024  Primary Diagnosis: MSSA bacteremia [R78.81, B95.61]         Precautions:                    ASSESSMENT :  Based on the objective data below, the patient's ADL independence is impacted by presence of Christiansen catheter and collection bag (assistance to manage during LB dressing and toileting tasks- no impairment anticipated once catheter removed) as well symptomatic orthostatic hypotension. Patient reported visual disturbance after ambulating from bathroom prior to OT arrival. He was received sitting EOB. BP checked while seated and notably low. Improved with supine positioning. Additional documentation with readings listed. Noted to drop again once he had gotten up to bathe and change gown (down to 81/52). Nurse made aware. Patient educated on fall risk associated with BP drops- patient verbalized  understanding. Further skilled acute occupational therapy is not indicated at this time.    Functional Outcome Measure:  The patient scored 22/24 on the [facility] AM-PACTM \"6 Clicks\" Daily Activity Inpatient Short Form outcome measure which is indicative of minor assistance required for ADL completion.           PLAN :  Recommend with staff: watch BP     Recommendation for discharge: (in order for the patient to meet his/her long term goals): No skilled occupational therapy    Other factors to consider for discharge: no additional factors    IF patient discharges home will need the following DME: none     SUBJECTIVE:   Patient pleasant and cooperative     OBJECTIVE DATA SUMMARY:     Past Medical History:   Diagnosis Date    History of diabetes insipidus     History of vascular access device 07/19/2024    [facility] VAT placed 4 fr single PICC, RT basilic 37 cm 0 cm out, arm cir 22 cm    History of weight loss     Neuropathy     Uncontrolled type 2 diabetes mellitus with hyperglycemia (HCC) 03/03/2021   No past surgical 
Patient alert and oriented x 4, no any issues during the day. Medicine given as per Mar, vitals taken and documented. Blood sugar done as per order. Christiansen care done as per protocol. Report given to night RN.  
Patient had an uneventful shift.  
Patient mendes's removed at 1830.  Patient sleep is on\off due to voiding trial. Nurse should wake him up to encourage  voiding and do straight cathter.   
Patient reporting visual changes after ambulating from bathroom. Had been sitting at EOB for awhile prior to OT arrival. Did not capture standing BP as his BP was low in sitting. Though he did stand for a moment while the bed was being prepared prior to 1st supine BP reading     07/22/24 0936 07/22/24 0940 07/22/24 0944   Vital Signs   BP (!) 89/58 (!) 85/60 94/61   MAP (Calculated) 68 68 72   BP Location Left upper arm Left upper arm Left upper arm   Patient Position Sitting Supine Supine       
Patient’s case reviewed during interdisciplinary team meeting in Med Surg/Tele Unit 2.  Rev. Esperanza Blakely MDiv, [facility]  
Patient’s case reviewed during interdisciplinary team meeting in Med Surg/Tele Unit 2.  Rev. Esperanza Blakely MDiv, [facility]  
Patient’s case reviewed during interdisciplinary team meeting in Med Surg/Tele Unit 2.  Rev. Esperanza Blakely MDiv, [facility]  
Physical Therapy    Patient received reclined in bed, having just awoken and reporting being tired today. Good affect and reported that he slept well but was still feeling too tired to do anything with PT. PT educated patient on importance of regular mobility and patient was amenable to doing so, reported that he will walk in room once he awakes and is interested in attempting stairs again later with assistance. PT will follow up with patient as able.    Jaquan Thomas, PT  
Physical therapy contact note    Chart reviewed and appreciated. Patient greeted finishing breakfast, requesting PT return at a later time, but agreeable to subjective history taking. Patient reports he has been able to ambulate in room and navigate restroom without use of rolling walker. Patient reports he plans to return to living alone in one story home upon discharge. Patient has 3 stairs to enter home and reports that his family is currently in the process of adding a handrail to these stairs.     Cody Issa, PT  
Physical therapy services attempted @11:28AM. Reporting DPT introduced self/role. Pt seated in chair with plan to use room bathroom prior to walking with therapy team. While present, Pt indicated goal to be able to walk outside and sit outside for a bit. \"Fresh air is the best\". Therapist appraised Pt that she would relay request to RN/PCT Teams and follow up as time permits.     Komal Bolanos, PT, DPT    
Uneventful night, plan of care continues.  
Uneventful shift.  
Vitals:    08/09/24 0724 08/09/24 1930 08/10/24 0738 08/10/24 1419   BP: 125/77 120/75 112/71 119/70   Pulse: 98 98 (!) 105 (!) 103   Resp: 18 16 16 16   Temp: 98.4 °F (36.9 °C) 98.1 °F (36.7 °C) 98.2 °F (36.8 °C)    TempSrc: Oral Oral Oral    SpO2: 100% 98% 100%    Weight:       Height:         16:00- Patient up to chair since late morning. No signs or symptoms of distress. VSS. Heart rate slightly tachy (low 100s) No c/o pain. Patient is diligent about drinking water. Mendes catheter in place, draining well, mendes care completed. Blood glucose taken per protocol and insulin given per MD orders. (See MAR for details). Will continue to monitor and provide care.     
  Depression: Not at risk (3/19/2024)    PHQ-2     PHQ-2 Score: 0   Housing Stability: Low Risk  (7/20/2024)    Housing Stability Vital Sign     Unable to Pay for Housing in the Last Year: No     Number of Places Lived in the Last Year: 1     Unstable Housing in the Last Year: No   Interpersonal Safety: Not At Risk (7/20/2024)    Interpersonal Safety Domain Source: IP Abuse Screening     Physical abuse: Denies     Verbal abuse: Denies     Emotional abuse: Denies     Financial abuse: Denies     Sexual abuse: Denies   Utilities: Not At Risk (7/20/2024)    [facility] Utilities     Threatened with loss of utilities: No       Review of Systems:   Reviewed and negative or as mentioned above      Vital Signs:    Last 24hrs VS reviewed since prior progress note. Most recent are:  Vitals:    07/22/24 0800   BP: 107/65   Pulse: 73   Resp:    Temp: 98.8 °F (37.1 °C)   SpO2:          Intake/Output Summary (Last 24 hours) at 7/22/2024 0832  Last data filed at 7/22/2024 0239  Gross per 24 hour   Intake 719.2 ml   Output 3600 ml   Net -2880.8 ml          Physical Examination:     I had a face to face encounter with this patient and independently examined them on 7/22/2024 as outlined below:          General: No acute distress. Well developed, well nourished.  HEENT: Eyes: perrl, no discharge or icterus. Mucous membranes are clear and moist.   Cardiovascular: S1, S2, rrr, no mrg  Respiratory: clear to auscultation b/l, no use of accessory muscles, no rales, crackles or wheezes.  Skin: no rashes/lesions. Skin warm and dry.  Abdomen: active bowel sounds on 4q, abdomen soft, nontender, no guarding or rigidity, no peritoneal signs  Extremities: No edema, no cyanosis, (+2) bi dorsalis pedal pulse  Neurological:  a&ox3. No facial asymmetry. Normal speech.  Mental Status: calm, cooperative.    Data Review:   I have personally and independently reviewed all pertinent labs, diagnostic studies, imaging, and have provided independent interpretation 
 PHQ-2 Score: 0   Housing Stability: Low Risk  (7/20/2024)    Housing Stability Vital Sign     Unable to Pay for Housing in the Last Year: No     Number of Places Lived in the Last Year: 1     Unstable Housing in the Last Year: No   Interpersonal Safety: Not At Risk (7/20/2024)    Interpersonal Safety Domain Source: IP Abuse Screening     Physical abuse: Denies     Verbal abuse: Denies     Emotional abuse: Denies     Financial abuse: Denies     Sexual abuse: Denies   Utilities: Not At Risk (7/20/2024)    [facility] Utilities     Threatened with loss of utilities: No       Review of Systems:   Reviewed and negative or as mentioned above      Vital Signs:    Last 24hrs VS reviewed since prior progress note. Most recent are:  Vitals:    07/28/24 1630   BP: 137/81   Pulse: 90   Resp:    Temp:    SpO2:          Intake/Output Summary (Last 24 hours) at 7/28/2024 1738  Last data filed at 7/28/2024 1600  Gross per 24 hour   Intake 2056.63 ml   Output 4750 ml   Net -2693.37 ml        Physical Examination:     I had a face to face encounter with this patient and independently examined them on 7/28/2024 as outlined below:          General: No acute distress. Well developed, well nourished.  HEENT: Eyes: perrl, no discharge or icterus. Mucous membranes are clear and moist.   Cardiovascular: S1, S2, rrr, no mrg  Respiratory: clear to auscultation b/l, no use of accessory muscles, no rales, crackles or wheezes.  Abdomen: active bowel sounds on 4q, abdomen soft, nontender, no guarding or rigidity, no peritoneal signs  Extremities: No edema, no cyanosis, (+2) bi dorsalis pedal pulse  Neurological:  a&ox3. No facial asymmetry. Normal speech.Lower ext numbness  Mental Status: calm, cooperative.    Data Review:   I have personally and independently reviewed all pertinent labs, diagnostic studies, imaging, and have provided independent interpretation of the same.     Labs:     No results for input(s): \"WBC\", \"HGB\", \"HCT\", \"PLT\" in the last 72 
(3/19/2024)    PHQ-2     PHQ-2 Score: 0   Housing Stability: Low Risk  (7/20/2024)    Housing Stability Vital Sign     Unable to Pay for Housing in the Last Year: No     Number of Places Lived in the Last Year: 1     Unstable Housing in the Last Year: No   Interpersonal Safety: Not At Risk (7/20/2024)    Interpersonal Safety Domain Source: IP Abuse Screening     Physical abuse: Denies     Verbal abuse: Denies     Emotional abuse: Denies     Financial abuse: Denies     Sexual abuse: Denies   Utilities: Not At Risk (7/20/2024)    [facility] Utilities     Threatened with loss of utilities: No       Review of Systems:   Reviewed and negative or as mentioned above      Vital Signs:    Last 24hrs VS reviewed since prior progress note. Most recent are:  Vitals:    08/06/24 0735   BP: 120/77   Pulse: 97   Resp: 16   Temp: 98.2 °F (36.8 °C)   SpO2: 99%         Intake/Output Summary (Last 24 hours) at 8/6/2024 1929  Last data filed at 8/6/2024 1809  Gross per 24 hour   Intake 1100 ml   Output 3070 ml   Net -1970 ml        Physical Examination:     I had a face to face encounter with this patient and independently examined them on 8/6/2024 as outlined below:          General: No acute distress. Well developed, well nourished.  HEENT: Eyes: perrl, no discharge or icterus. Mucous membranes are clear and moist.   Cardiovascular: S1, S2, rrr, no mrg  Respiratory: clear to auscultation b/l, no use of accessory muscles, no rales, crackles or wheezes.  Abdomen: active bowel sounds on 4q, abdomen soft, nontender, no guarding or rigidity, no peritoneal signs  Extremities: No edema, no cyanosis, (+2) bi dorsalis pedal pulse  Neurological:  a&ox3. No facial asymmetry. Normal speech.Lower ext numbness  Mental Status: calm, cooperative.    Data Review:   I have personally and independently reviewed all pertinent labs, diagnostic studies, imaging, and have provided independent interpretation of the same.     Labs:     No results for input(s): 
abuse: Denies     Verbal abuse: Denies     Emotional abuse: Denies     Financial abuse: Denies     Sexual abuse: Denies   Utilities: Not At Risk (7/20/2024)    [facility] Utilities     Threatened with loss of utilities: No       Review of Systems:   Reviewed and negative or as mentioned above      Vital Signs:    Last 24hrs VS reviewed since prior progress note. Most recent are:  Vitals:    07/21/24 0730   BP: (!) 106/55   Pulse: 77   Resp: 16   Temp: 97.9 °F (36.6 °C)   SpO2: 99%         Intake/Output Summary (Last 24 hours) at 7/21/2024 0800  Last data filed at 7/21/2024 0601  Gross per 24 hour   Intake 566.37 ml   Output 2150 ml   Net -1583.63 ml        Physical Examination:     I had a face to face encounter with this patient and independently examined them on 7/21/2024 as outlined below:          General: No acute distress. Well developed, well nourished.  HEENT: Eyes: perrl, no discharge or icterus. Mucous membranes are clear and moist.   Cardiovascular: S1, S2, rrr, no mrg  Respiratory: clear to auscultation b/l, no use of accessory muscles, no rales, crackles or wheezes.  Skin: no rashes/lesions. Skin warm and dry.  Abdomen: active bowel sounds on 4q, abdomen soft, nontender, no guarding or rigidity, no peritoneal signs  Extremities: No edema, no cyanosis, (+2) bi dorsalis pedal pulse  Neurological:  a&ox3. No facial asymmetry. Normal speech.  Mental Status: calm, cooperative.    Data Review:   I have personally and independently reviewed all pertinent labs, diagnostic studies, imaging, and have provided independent interpretation of the same.     Labs:     Recent Labs     07/21/24  0605   WBC 14.3*   HGB 8.5*   HCT 25.8*        Recent Labs     07/21/24  0605      K 4.4      CO2 32   BUN 25*     No results for input(s): \"ALT\", \"TP\", \"GLOB\", \"GGT\" in the last 72 hours.    Invalid input(s): \"SGOT\", \"GPT\", \"AP\", \"TBIL\", \"TBILI\", \"ALB\", \"AML\", \"AMYP\", \"LPSE\", \"HLPSE\"  No results for input(s): \"INR\", 
Diet Advancement/Tolerance, Food and Nutrient Intake, Supplement Intake  Physical Signs/Symptoms Outcomes: Biochemical Data, Weight, GI Status, Hemodynamic Status     Discharge Planning:    Recommend pursue outpatient diabetes education     Geovani Slade RD, [facility]  [phone]  
hours.    Invalid input(s): \"SGOT\", \"GPT\", \"AP\", \"TBIL\", \"TBILI\", \"ALB\", \"AML\", \"AMYP\", \"LPSE\", \"HLPSE\"    No results for input(s): \"INR\", \"APTT\" in the last 72 hours.    Invalid input(s): \"PTP\"   No results for input(s): \"TIBC\" in the last 72 hours.    Invalid input(s): \"FE\", \"PSAT\", \"FERR\"   No results found for: \"RBCF\"   No results for input(s): \"PH\", \"PCO2\", \"PO2\" in the last 72 hours.  No results for input(s): \"CPK\" in the last 72 hours.    Invalid input(s): \"CPKMB\", \"CKNDX\", \"TROIQ\"  Lab Results   Component Value Date/Time    CHOL 187 10/04/2022 04:30 PM    HDL 56 10/04/2022 04:30 PM    LDL 96.8 10/04/2022 04:30 PM     No results found for: \"GLUCPOC\"  [unfilled]    Notes reviewed from all clinical/nonclinical/nursing services involved in patient's clinical care. Care coordination discussions were held with appropriate clinical/nonclinical/ nursing providers based on care coordination needs.         Patients current active Medications were reviewed, considered, added and adjusted based on the clinical condition today.      Home Medications were reconciled to the best of my ability given all available resources at the time of admission. Route is PO if not otherwise noted.      Admission Status:[id number]:::1}      Medications Reviewed:     Current Facility-Administered Medications   Medication Dose Route Frequency    loperamide (IMODIUM) capsule 2 mg  2 mg Oral Daily    lidocaine (XYLOCAINE) 2 % uro-jet   Topical Q4H PRN    miconazole (MICOTIN) 2 % cream   Topical BID    insulin lispro (HUMALOG,ADMELOG) injection vial 8 Units  8 Units SubCUTAneous TID WC    insulin glargine (LANTUS) injection vial 22 Units  22 Units SubCUTAneous QPM    naloxone (NARCAN) injection 0.4 mg  0.4 mg IntraVENous PRN    ceFAZolin (ANCEF) 2000 mg in sterile water 20 mL IV syringe  2,000 mg IntraVENous Q8H    glucose chewable tablet 16 g  4 tablet Oral PRN    dextrose bolus 10% 125 mL  125 mL IntraVENous PRN    Or    dextrose bolus 10% 250 mL 
in the last 72 hours.      No results for input(s): \"NA\", \"K\", \"CL\", \"CO2\", \"BUN\", \"GLU\", \"MG\", \"PHOS\" in the last 72 hours.    Invalid input(s): \"CREA\", \"CA\", \"URICA\"      No results for input(s): \"ALT\", \"TP\", \"GLOB\", \"GGT\" in the last 72 hours.    Invalid input(s): \"SGOT\", \"GPT\", \"AP\", \"TBIL\", \"TBILI\", \"ALB\", \"AML\", \"AMYP\", \"LPSE\", \"HLPSE\"    No results for input(s): \"INR\", \"APTT\" in the last 72 hours.    Invalid input(s): \"PTP\"   No results for input(s): \"TIBC\" in the last 72 hours.    Invalid input(s): \"FE\", \"PSAT\", \"FERR\"   No results found for: \"RBCF\"   No results for input(s): \"PH\", \"PCO2\", \"PO2\" in the last 72 hours.  No results for input(s): \"CPK\" in the last 72 hours.    Invalid input(s): \"CPKMB\", \"CKNDX\", \"TROIQ\"  Lab Results   Component Value Date/Time    CHOL 187 10/04/2022 04:30 PM    HDL 56 10/04/2022 04:30 PM    LDL 96.8 10/04/2022 04:30 PM     No results found for: \"GLUCPOC\"  [unfilled]    Notes reviewed from all clinical/nonclinical/nursing services involved in patient's clinical care. Care coordination discussions were held with appropriate clinical/nonclinical/ nursing providers based on care coordination needs.         Patients current active Medications were reviewed, considered, added and adjusted based on the clinical condition today.      Home Medications were reconciled to the best of my ability given all available resources at the time of admission. Route is PO if not otherwise noted.      Admission Status:[id number]:::1}      Medications Reviewed:     Current Facility-Administered Medications   Medication Dose Route Frequency    lipase-protease-amylase (ZENPEP) delayed release capsule 5,000 Units  5,000 Units Oral TID WC    loperamide (IMODIUM) capsule 2 mg  2 mg Oral Daily    lidocaine (XYLOCAINE) 2 % uro-jet   Topical Q4H PRN    miconazole (MICOTIN) 2 % cream   Topical BID    insulin lispro (HUMALOG,ADMELOG) injection vial 8 Units  8 Units SubCUTAneous TID WC    insulin glargine (LANTUS) 
125 mL IntraVENous PRN    Or    dextrose bolus 10% 250 mL  250 mL IntraVENous PRN    glucagon injection 1 mg  1 mg SubCUTAneous PRN    heparin (porcine) injection 5,000 Units  5,000 Units SubCUTAneous BID    ondansetron (ZOFRAN-ODT) disintegrating tablet 4 mg  4 mg Oral Q8H PRN    Or    ondansetron (ZOFRAN) injection 4 mg  4 mg IntraVENous Q6H PRN    acetaminophen (TYLENOL) tablet 650 mg  650 mg Oral Q6H PRN    Or    acetaminophen (TYLENOL) suppository 650 mg  650 mg Rectal Q6H PRN    sodium chloride flush 0.9 % injection 5-40 mL  5-40 mL IntraVENous 2 times per day    sodium chloride flush 0.9 % injection 5-40 mL  5-40 mL IntraVENous PRN    ALPRAZolam (XANAX) tablet 0.25 mg  0.25 mg Oral 4x Daily PRN    HYDROcodone-acetaminophen (NORCO) 5-325 MG per tablet 1 tablet  1 tablet Oral Q6H PRN    insulin lispro (HUMALOG,ADMELOG) injection vial 0-4 Units  0-4 Units SubCUTAneous TID WC    insulin lispro (HUMALOG,ADMELOG) injection vial 0-4 Units  0-4 Units SubCUTAneous Nightly    midodrine (PROAMATINE) tablet 5 mg  5 mg Oral TID WC    gabapentin (NEURONTIN) capsule 100 mg  100 mg Oral TID    lactobacillus (CULTURELLE) capsule 1 capsule  1 capsule Oral Daily with breakfast    lactated ringers IV soln infusion   IntraVENous Continuous    potassium chloride (KLOR-CON) extended release tablet 40 mEq  40 mEq Oral PRN    Or    potassium bicarb-citric acid (EFFER-K) effervescent tablet 40 mEq  40 mEq Oral PRN    Or    potassium chloride 10 mEq/100 mL IVPB (Peripheral Line)  10 mEq IntraVENous PRN    magnesium sulfate 2000 mg in 50 mL IVPB premix  2,000 mg IntraVENous PRN    polyethylene glycol (GLYCOLAX) packet 17 g  17 g Oral Daily PRN    lidocaine (XYLOCAINE) 2 % uro-jet   Topical PRN     ______________________________________________________________________  EXPECTED LENGTH OF STAY: 21  ACTUAL LENGTH OF STAY:          7                 Vidal Toney MD   
in sterile water 20 mL IV syringe  2,000 mg IntraVENous Q8H    glucose chewable tablet 16 g  4 tablet Oral PRN    dextrose bolus 10% 125 mL  125 mL IntraVENous PRN    Or    dextrose bolus 10% 250 mL  250 mL IntraVENous PRN    glucagon injection 1 mg  1 mg SubCUTAneous PRN    heparin (porcine) injection 5,000 Units  5,000 Units SubCUTAneous BID    ondansetron (ZOFRAN-ODT) disintegrating tablet 4 mg  4 mg Oral Q8H PRN    Or    ondansetron (ZOFRAN) injection 4 mg  4 mg IntraVENous Q6H PRN    acetaminophen (TYLENOL) tablet 650 mg  650 mg Oral Q6H PRN    Or    acetaminophen (TYLENOL) suppository 650 mg  650 mg Rectal Q6H PRN    sodium chloride flush 0.9 % injection 5-40 mL  5-40 mL IntraVENous 2 times per day    sodium chloride flush 0.9 % injection 5-40 mL  5-40 mL IntraVENous PRN    insulin lispro (HUMALOG,ADMELOG) injection vial 0-4 Units  0-4 Units SubCUTAneous TID WC    insulin lispro (HUMALOG,ADMELOG) injection vial 0-4 Units  0-4 Units SubCUTAneous Nightly    midodrine (PROAMATINE) tablet 5 mg  5 mg Oral TID WC    gabapentin (NEURONTIN) capsule 100 mg  100 mg Oral TID    lactobacillus (CULTURELLE) capsule 1 capsule  1 capsule Oral Daily with breakfast    lactated ringers IV soln infusion   IntraVENous Continuous    potassium chloride (KLOR-CON) extended release tablet 40 mEq  40 mEq Oral PRN    Or    potassium bicarb-citric acid (EFFER-K) effervescent tablet 40 mEq  40 mEq Oral PRN    Or    potassium chloride 10 mEq/100 mL IVPB (Peripheral Line)  10 mEq IntraVENous PRN    magnesium sulfate 2000 mg in 50 mL IVPB premix  2,000 mg IntraVENous PRN    polyethylene glycol (GLYCOLAX) packet 17 g  17 g Oral Daily PRN     ______________________________________________________________________  EXPECTED LENGTH OF STAY: 21  ACTUAL LENGTH OF STAY:          16                 Debbie Gilbert MD

## 2024-08-12 ENCOUNTER — TELEPHONE (OUTPATIENT)
Age: 54
End: 2024-08-12

## 2024-08-12 NOTE — TELEPHONE ENCOUNTER
Care Transitions Initial Follow Up Call    Outreach made within 2 business days of discharge: Yes    Patient: Steve Martinez Patient : 1970   MRN: [id number]  Reason for Admission: There are no discharge diagnoses documented for the most recent discharge.  Discharge Date: 8/10/24       Spoke with: Patient    Discharge department/facility: [facility] Patient Contact:  Was patient able to fill all prescriptions: No: ZENPEP prescription was $200 and patient stated that he was not able to afford paying that price. Patient also stated that he did not pickup diabetic supplies nor long acting insulin as he has plenty at home at this time.  Was patient instructed to bring all medications to the follow-up visit: Yes  Is patient taking all medications as directed in the discharge summary? No, the cost of the ZENPEP is too much for the patient  Does patient understand their discharge instructions: Yes  Does patient have questions or concerns that need addressed prior to 7-14 day follow up office visit: no    Scheduled appointment with PCP within 7-14 days    Follow Up  Future Appointments   Date Time Provider Department Center   2024  9:00 AM Josias Hale MD [facility] DEP       Javi Meza RN

## 2024-08-13 ENCOUNTER — OFFICE VISIT (OUTPATIENT)
Age: 54
End: 2024-08-13

## 2024-08-13 ENCOUNTER — TELEPHONE (OUTPATIENT)
Age: 54
End: 2024-08-13

## 2024-08-13 VITALS
DIASTOLIC BLOOD PRESSURE: 68 MMHG | OXYGEN SATURATION: 99 % | RESPIRATION RATE: 18 BRPM | HEIGHT: 62 IN | TEMPERATURE: 98.2 F | SYSTOLIC BLOOD PRESSURE: 106 MMHG | WEIGHT: 100.6 LBS | BODY MASS INDEX: 18.51 KG/M2 | HEART RATE: 102 BPM

## 2024-08-13 DIAGNOSIS — N17.9 ACUTE KIDNEY INJURY (HCC): ICD-10-CM

## 2024-08-13 DIAGNOSIS — Z09 HOSPITAL DISCHARGE FOLLOW-UP: Primary | ICD-10-CM

## 2024-08-13 SDOH — ECONOMIC STABILITY: FOOD INSECURITY: WITHIN THE PAST 12 MONTHS, THE FOOD YOU BOUGHT JUST DIDN'T LAST AND YOU DIDN'T HAVE MONEY TO GET MORE.: NEVER TRUE

## 2024-08-13 SDOH — ECONOMIC STABILITY: FOOD INSECURITY: WITHIN THE PAST 12 MONTHS, YOU WORRIED THAT YOUR FOOD WOULD RUN OUT BEFORE YOU GOT MONEY TO BUY MORE.: NEVER TRUE

## 2024-08-13 SDOH — ECONOMIC STABILITY: INCOME INSECURITY: HOW HARD IS IT FOR YOU TO PAY FOR THE VERY BASICS LIKE FOOD, HOUSING, MEDICAL CARE, AND HEATING?: SOMEWHAT HARD

## 2024-08-13 ASSESSMENT — PATIENT HEALTH QUESTIONNAIRE - PHQ9
SUM OF ALL RESPONSES TO PHQ QUESTIONS 1-9: 0
SUM OF ALL RESPONSES TO PHQ QUESTIONS 1-9: 0
1. LITTLE INTEREST OR PLEASURE IN DOING THINGS: NOT AT ALL
SUM OF ALL RESPONSES TO PHQ QUESTIONS 1-9: 0
2. FEELING DOWN, DEPRESSED OR HOPELESS: NOT AT ALL
SUM OF ALL RESPONSES TO PHQ9 QUESTIONS 1 & 2: 0
SUM OF ALL RESPONSES TO PHQ QUESTIONS 1-9: 0

## 2024-08-13 NOTE — PROGRESS NOTES
Post-Discharge Transitional Care  Follow Up      Steve Martinez   YOB: 1970    Date of Office Visit:  8/13/2024  Date of Hospital Admission: 7/20/24  Date of Hospital Discharge: 8/10/24  Risk of hospital readmission (high >=14%. Medium >=10%) :Readmission Risk Score: 15.3      Care management risk score Rising risk (score 2-5) and Complex Care (Scores >=6): No Risk Score On File     Non face to face  following discharge, date last encounter closed (first attempt may have been earlier): 08/12/2024    Call initiated 2 business days of discharge: Yes    ASSESSMENT/PLAN:     Medical Decision Making: low complexity  Return in about 3 months (around 11/13/2024), or if symptoms worsen or fail to improve.      AMOS Improving: baseline creatinine is 1.15. Creatinine on 8/10 was 1.64. Currently has mendes in place. Likely due to dehydration vs. Sepsis vs. Nephrotoxic medications administered inpatient. According to discharge summary nephrology will schedule an appointment for 30 days from discharge.   - encouraged to maintain fluid intake  - repeat BMP on 8/19 via a lab only appointment    Bilateral hydroureteronephrosis on CT(07/11)  Right Hydronephrosis on USG(07/13), resolution of left hydro  Urinary retention status post Mendes catheterization: mendes still in place with no complications. Plan to remove with urology on 9/9 as no earlier appointments were available.  - encouraged patient to continue to maintain exceptionally good hygiene of the mendes prior to his appoint with urology     Possible Pancreatic insufficiency: patient has not been taking lipase after discharge due to inability to afford the medication. He has an appointment with GI on 8/15 to discuss further management.  - GI appointment on 8/15    Severe sepsis - likely 2/2 UTI +/- pyelo, MSSA Bacteremia (resolved)  MSSA Bacteremia(07/11 and 07/12) , likely secondary to head wound (resolved)  Leukocytosis.  (Resolved): Clinically improving and

## 2024-08-13 NOTE — PROGRESS NOTES
Identified pt with two pt identifiers(name and ). Reviewed record in preparation for visit and have obtained necessary documentation.  Chief Complaint   Patient presents with    Follow-Up from Hospital   24 [facility]  Severe sepsis     Health Maintenance Due   Topic    Pneumococcal 0-64 years Vaccine (1 of 2 - PCV)    HIV screen     Diabetic retinal exam     Hepatitis C screen     Hepatitis B vaccine (1 of 3 - 19+ 3-dose series)    DTaP/Tdap/Td vaccine (1 - Tdap)    Colorectal Cancer Screen     Shingles vaccine (1 of 2)    Diabetic foot exam     COVID-19 Vaccine (1 -  season)    Diabetic Alb to Cr ratio (uACR) test     Lipids     Flu vaccine (1)       Vitals:    24 0857   BP: 106/68   Site: Right Upper Arm   Position: Sitting   Cuff Size: Small Adult   Pulse: (!) 102   Resp: 18   Temp: 98.2 °F (36.8 °C)   TempSrc: Oral   SpO2: 99%   Weight: 45.6 kg (100 lb 9.6 oz)   Height: 1.575 m (5' 2.01\")         \"Have you been to the ER, urgent care clinic since your last visit?  Hospitalized since your last visit?\"    Yes, 24 [facility], Severe Sepsis    “Have you seen or consulted any other health care providers outside of [facility] since your last visit?”    NO        “Have you had a colorectal cancer screening such as a colonoscopy/FIT/Cologuard?    NO    No colonoscopy on file  No cologuard on file  No FIT/FOBT on file   No flexible sigmoidoscopy on file         Click Here for Release of Records Request     This patient is accompanied in the office by his sibling.  I have received verbal consent from Steve Martinez to discuss any/all medical information while they are present in the room.

## 2024-08-13 NOTE — PATIENT INSTRUCTIONS
Thanks for coming today. Please see your GI doctor and discuss arranging a more affordable medication. Please reach out again if you are having any change in symptoms or worsening of symptoms.

## 2024-08-14 NOTE — TELEPHONE ENCOUNTER
Patient contacted and notified about returning around 8/19 for a lab visit. Patient will be getting a BMP recheck in accordance with hospital follow up.

## 2024-08-19 ENCOUNTER — LAB (OUTPATIENT)
Age: 54
End: 2024-08-19

## 2024-08-19 DIAGNOSIS — Z09 HOSPITAL DISCHARGE FOLLOW-UP: ICD-10-CM

## 2024-08-19 DIAGNOSIS — N17.9 ACUTE KIDNEY INJURY (HCC): ICD-10-CM

## 2024-08-19 LAB
ANION GAP SERPL CALC-SCNC: 5 MMOL/L (ref 5–15)
BUN SERPL-MCNC: 23 MG/DL (ref 6–20)
BUN/CREAT SERPL: 16 (ref 12–20)
CALCIUM SERPL-MCNC: 9.9 MG/DL (ref 8.5–10.1)
CHLORIDE SERPL-SCNC: 105 MMOL/L (ref 97–108)
CO2 SERPL-SCNC: 27 MMOL/L (ref 21–32)
CREAT SERPL-MCNC: 1.41 MG/DL (ref 0.7–1.3)
GLUCOSE SERPL-MCNC: 129 MG/DL (ref 65–100)
POTASSIUM SERPL-SCNC: 4.2 MMOL/L (ref 3.5–5.1)
SODIUM SERPL-SCNC: 137 MMOL/L (ref 136–145)

## 2024-08-26 ENCOUNTER — TELEPHONE (OUTPATIENT)
Age: 54
End: 2024-08-26

## 2024-08-26 DIAGNOSIS — R65.20 SEVERE SEPSIS (HCC): Primary | ICD-10-CM

## 2024-08-26 DIAGNOSIS — A41.9 SEVERE SEPSIS (HCC): Primary | ICD-10-CM

## 2024-08-26 RX ORDER — MIDODRINE HYDROCHLORIDE 5 MG/1
5 TABLET ORAL
Qty: 90 TABLET | Refills: 0 | Status: SHIPPED | OUTPATIENT
Start: 2024-08-26

## 2024-08-26 NOTE — TELEPHONE ENCOUNTER
Pt completed his Hospital follow up with you on 8/13. He is requesting that you send a new Rx for midodrine (PROAMATINE) 5 MG tablet to [address] -  [phone] - F [phone]. He stated that the pharmacy did not have 90 tablets at the time he picked up Rx that was written by hospital and it was split 40, then he was to  the other 50 when it was in stock. When pt went to  the remainder, he was told he had to pay full price and could not use Good Rx card. He was advised that he could get the discounted price with Good Rx if a new Rx was sent to pharmacy.    Please advise

## 2024-09-11 ENCOUNTER — PATIENT MESSAGE (OUTPATIENT)
Age: 54
End: 2024-09-11

## 2024-09-11 ENCOUNTER — TELEPHONE (OUTPATIENT)
Age: 54
End: 2024-09-11

## 2024-09-11 DIAGNOSIS — E10.42 DIABETIC PERIPHERAL NEUROPATHY ASSOCIATED WITH TYPE 1 DIABETES MELLITUS (HCC): ICD-10-CM

## 2024-09-11 RX ORDER — GABAPENTIN 100 MG/1
100 CAPSULE ORAL 3 TIMES DAILY
Qty: 90 CAPSULE | Refills: 0 | Status: SHIPPED | OUTPATIENT
Start: 2024-09-11 | End: 2024-10-11

## 2024-09-12 DIAGNOSIS — R65.20 SEVERE SEPSIS (HCC): Primary | ICD-10-CM

## 2024-09-12 DIAGNOSIS — A41.9 SEVERE SEPSIS (HCC): Primary | ICD-10-CM

## 2024-09-12 DIAGNOSIS — E10.22 TYPE 1 DIABETES MELLITUS WITH STAGE 3A CHRONIC KIDNEY DISEASE (HCC): ICD-10-CM

## 2024-09-12 DIAGNOSIS — N18.31 TYPE 1 DIABETES MELLITUS WITH STAGE 3A CHRONIC KIDNEY DISEASE (HCC): ICD-10-CM

## 2024-09-12 RX ORDER — INSULIN GLARGINE 100 [IU]/ML
22 INJECTION, SOLUTION SUBCUTANEOUS NIGHTLY
Qty: 7 ML | Refills: 0 | Status: SHIPPED | OUTPATIENT
Start: 2024-09-12

## 2024-09-12 RX ORDER — MIDODRINE HYDROCHLORIDE 5 MG/1
5 TABLET ORAL
Qty: 90 TABLET | Refills: 0 | Status: SHIPPED | OUTPATIENT
Start: 2024-09-12

## 2024-10-09 DIAGNOSIS — E10.42 DIABETIC PERIPHERAL NEUROPATHY ASSOCIATED WITH TYPE 1 DIABETES MELLITUS (HCC): ICD-10-CM

## 2024-10-11 NOTE — TELEPHONE ENCOUNTER
Pt called to check the status of this Rx request. He also need a refill of   midodrine (PROAMATINE) 5 MG tablet to be sent to the same pharmacy. He is requesting 90 supplies with refills.     Thank you

## 2024-10-12 DIAGNOSIS — E10.42 DIABETIC PERIPHERAL NEUROPATHY ASSOCIATED WITH TYPE 1 DIABETES MELLITUS (HCC): ICD-10-CM

## 2024-10-12 DIAGNOSIS — A41.9 SEVERE SEPSIS (HCC): ICD-10-CM

## 2024-10-12 DIAGNOSIS — R65.20 SEVERE SEPSIS (HCC): ICD-10-CM

## 2024-10-12 RX ORDER — MIDODRINE HYDROCHLORIDE 5 MG/1
5 TABLET ORAL
Qty: 90 TABLET | Refills: 0 | Status: SHIPPED | OUTPATIENT
Start: 2024-10-12

## 2024-10-12 RX ORDER — GABAPENTIN 100 MG/1
100 CAPSULE ORAL 3 TIMES DAILY
Qty: 90 CAPSULE | Refills: 0 | Status: SHIPPED | OUTPATIENT
Start: 2024-10-12 | End: 2024-11-11

## 2024-10-12 NOTE — PROGRESS NOTES
Refilled gabapentin and midodrine per pt request. One month supply given. Patient instructed to schedule follow-up prior to next refill.

## 2024-11-01 RX ORDER — GABAPENTIN 100 MG/1
CAPSULE ORAL
Qty: 90 CAPSULE | Refills: 0 | OUTPATIENT
Start: 2024-11-01

## 2024-11-14 ENCOUNTER — OFFICE VISIT (OUTPATIENT)
Age: 54
End: 2024-11-14

## 2024-11-14 VITALS
WEIGHT: 106.04 LBS | HEIGHT: 62 IN | SYSTOLIC BLOOD PRESSURE: 109 MMHG | HEART RATE: 94 BPM | BODY MASS INDEX: 19.51 KG/M2 | OXYGEN SATURATION: 98 % | DIASTOLIC BLOOD PRESSURE: 71 MMHG | RESPIRATION RATE: 18 BRPM | TEMPERATURE: 98.7 F

## 2024-11-14 DIAGNOSIS — E10.42 DIABETIC PERIPHERAL NEUROPATHY ASSOCIATED WITH TYPE 1 DIABETES MELLITUS (HCC): ICD-10-CM

## 2024-11-14 DIAGNOSIS — Z59.9 FINANCIAL DIFFICULTIES: ICD-10-CM

## 2024-11-14 DIAGNOSIS — I95.9 HYPOTENSION, UNSPECIFIED HYPOTENSION TYPE: ICD-10-CM

## 2024-11-14 DIAGNOSIS — Z01.89 ROUTINE LAB DRAW: Primary | ICD-10-CM

## 2024-11-14 PROBLEM — R65.20 SEVERE SEPSIS (HCC): Status: RESOLVED | Noted: 2024-07-11 | Resolved: 2024-11-14

## 2024-11-14 PROBLEM — A41.9 SEVERE SEPSIS (HCC): Status: RESOLVED | Noted: 2024-07-11 | Resolved: 2024-11-14

## 2024-11-14 PROCEDURE — 99213 OFFICE O/P EST LOW 20 MIN: CPT

## 2024-11-14 RX ORDER — TAMSULOSIN HYDROCHLORIDE 0.4 MG/1
0.4 CAPSULE ORAL DAILY
COMMUNITY
Start: 2024-11-07

## 2024-11-14 RX ORDER — MIDODRINE HYDROCHLORIDE 5 MG/1
5 TABLET ORAL
Qty: 90 TABLET | Refills: 2 | Status: SHIPPED | OUTPATIENT
Start: 2024-11-14

## 2024-11-14 RX ORDER — MIDODRINE HYDROCHLORIDE 5 MG/1
5 TABLET ORAL
Qty: 90 TABLET | Refills: 2 | Status: SHIPPED | OUTPATIENT
Start: 2024-11-14 | End: 2024-11-14

## 2024-11-14 RX ORDER — GABAPENTIN 100 MG/1
100 CAPSULE ORAL 3 TIMES DAILY
Qty: 90 CAPSULE | Refills: 2 | Status: SHIPPED | OUTPATIENT
Start: 2024-11-14 | End: 2025-02-12

## 2024-11-14 RX ORDER — INSULIN ASPART 100 [IU]/ML
INJECTION, SOLUTION INTRAVENOUS; SUBCUTANEOUS
COMMUNITY
Start: 2024-11-11

## 2024-11-14 SDOH — ECONOMIC STABILITY - INCOME SECURITY: PROBLEM RELATED TO HOUSING AND ECONOMIC CIRCUMSTANCES, UNSPECIFIED: Z59.9

## 2024-11-14 ASSESSMENT — PATIENT HEALTH QUESTIONNAIRE - PHQ9
SUM OF ALL RESPONSES TO PHQ9 QUESTIONS 1 & 2: 0
SUM OF ALL RESPONSES TO PHQ QUESTIONS 1-9: 0
SUM OF ALL RESPONSES TO PHQ QUESTIONS 1-9: 0
1. LITTLE INTEREST OR PLEASURE IN DOING THINGS: NOT AT ALL
2. FEELING DOWN, DEPRESSED OR HOPELESS: NOT AT ALL
SUM OF ALL RESPONSES TO PHQ QUESTIONS 1-9: 0
SUM OF ALL RESPONSES TO PHQ QUESTIONS 1-9: 0

## 2024-11-14 ASSESSMENT — ENCOUNTER SYMPTOMS
BLOOD IN STOOL: 0
NAUSEA: 0
VOMITING: 0
TROUBLE SWALLOWING: 0
DIARRHEA: 1
EYE ITCHING: 0
EYE PAIN: 0
SHORTNESS OF BREATH: 0

## 2024-11-14 NOTE — PROGRESS NOTES
Identified pt with two pt identifiers(name and ). Reviewed record in preparation for visit and have obtained necessary documentation.  Chief Complaint   Patient presents with    Follow-up     Medication check Gabapentin        Health Maintenance Due   Topic    Pneumococcal 0-64 years Vaccine (1 of 2 - PCV)    HIV screen     Diabetic retinal exam     Hepatitis C screen     Hepatitis B vaccine (1 of 3 - 19+ 3-dose series)    Colorectal Cancer Screen     Shingles vaccine (1 of 2)    Diabetic foot exam     Diabetic Alb to Cr ratio (uACR) test     Lipids     COVID-19 Vaccine ( season)       Vitals:    24 1526   BP: 109/71   Site: Right Upper Arm   Position: Sitting   Cuff Size: Small Adult   Pulse: 94   Resp: 18   Temp: 98.7 °F (37.1 °C)   TempSrc: Oral   SpO2: 98%   Weight: 48.1 kg (106 lb 0.7 oz)   Height: 1.575 m (5' 2.01\")         \"Have you been to the ER, urgent care clinic since your last visit?  Hospitalized since your last visit?\"    NO    “Have you seen or consulted any other health care providers outside of [facility] since your last visit?”    NO        “Have you had a colorectal cancer screening such as a colonoscopy/FIT/Cologuard?    NO    No colonoscopy on file  No cologuard on file  No FIT/FOBT on file   No flexible sigmoidoscopy on file        Click Here for Release of Records Request     This patient is accompanied in the office by his self.  I have received verbal consent from Steve Martinez to discuss any/all medical information while they are present in the room.  
5.6 % Final     Comment:     VERIFIED BY DILUTION  (NOTE)  HbA1C Interpretive Ranges  <5.7              Normal  5.7 - 6.4         Consider Prediabetes  >6.5              Consider Diabetes       Lipids: ordered today, @LastLipids@    Allergies, reviewed:   No Known Allergies    Medications, reviewed:  Current Outpatient Medications   Medication Sig    tamsulosin (FLOMAX) 0.4 MG capsule Take 1 capsule by mouth daily    gabapentin (NEURONTIN) 100 MG capsule Take 1 capsule by mouth 3 times daily for 90 days. Max Daily Amount: 300 mg    midodrine (PROAMATINE) 5 MG tablet Take 1 tablet by mouth 3 times daily (with meals) Hold medication if top number (systolic) is greater than 120 or if bottom number (diastolic) is greater than 80.    insulin glargine (LANTUS SOLOSTAR) 100 UNIT/ML injection pen Inject 22 Units into the skin nightly    insulin lispro, 1 Unit Dial, (HUMALOG KWIKPEN) 100 UNIT/ML SOPN Inject per sliding scale 3 times daily (before meals): BG : 0 units insulin. -249: 7 units. -299: 8 units. -349: 9 units. BG over 349: 10 units.    blood glucose monitor strips Test 3 times a day & as needed for symptoms of irregular blood glucose. Dispense sufficient amount for indicated testing frequency plus additional to accommodate PRN testing needs.    Lancets MISC 1 each by Does not apply route daily (Patient taking differently: 1 each by Does not apply route 3 times daily)    Insulin Pen Needle (PEN NEEDLES 3/16\") 31G X 5 MM MISC 1 each by Does not apply route daily    insulin aspart (NOVOLOG FLEXPEN) 100 UNIT/ML injection pen BG : 0 units, 151-199: 3 units insulin, -249: 7 units. -299: 8 units. -349: 9 units. BG over 349: 10 units. Max 30 units daily (Patient not taking: Reported on 11/14/2024)    Lancets MISC Use to check sugar 4 times per day (Patient not taking: Reported on 11/14/2024)     No current facility-administered medications for this visit.       Past Medical

## 2024-11-15 ENCOUNTER — TELEPHONE (OUTPATIENT)
Age: 54
End: 2024-11-15

## 2024-11-15 DIAGNOSIS — Z79.4 TYPE 2 DIABETES MELLITUS WITH HYPERGLYCEMIA, WITH LONG-TERM CURRENT USE OF INSULIN (HCC): Primary | ICD-10-CM

## 2024-11-15 DIAGNOSIS — E11.65 TYPE 2 DIABETES MELLITUS WITH HYPERGLYCEMIA, WITH LONG-TERM CURRENT USE OF INSULIN (HCC): Primary | ICD-10-CM

## 2024-11-15 LAB
CHOLEST SERPL-MCNC: 171 MG/DL
HDLC SERPL-MCNC: 44 MG/DL
HDLC SERPL: 3.9 (ref 0–5)
LDLC SERPL CALC-MCNC: 54.4 MG/DL (ref 0–100)
TRIGL SERPL-MCNC: 363 MG/DL
VLDLC SERPL CALC-MCNC: 72.6 MG/DL

## 2024-11-15 RX ORDER — ATORVASTATIN CALCIUM 10 MG/1
10 TABLET, FILM COATED ORAL DAILY
Qty: 90 TABLET | Refills: 1 | Status: SHIPPED | OUTPATIENT
Start: 2024-11-15

## 2024-11-15 NOTE — TELEPHONE ENCOUNTER
Patient contacted via phone to discuss lab results. Patient not fasting at time of lab draw. Triglycerides high. Remaining lipid panel grossly WNLs. LDL <70 with lifestyle management. ASCVD 6.3% with PREVENTTM Online Calculator. Patient is diabetic with improving A1c. Patient contacted via phone to discuss initiation of statin therapy. Limited cardiovascular risk factors as patient is not a smoker and does not have HTN. However will initiate moderate intensity statin therapy for inflammation reduction and plaque stabilization benefits. Recheck lipid panel on 2/19/2025. Patient contacted via phone and agreeable to plan.

## 2024-11-15 NOTE — RESULT ENCOUNTER NOTE
Patient not fasting. Triglycerides high. Remaining lipid panel grossly WNLs. LDL <70 with lifestyle management. ASCVD 6.3% with PREVENTTM Online Calculator. Patient is diabetic with improving A1c. Patient contacted via phone to discuss initiation of statin therapy. Limited cardiovascular risk factors as patient is not a smoker and does not have HTN. However will initiate moderate intensity statin therapy for inflammation reduction and plaque stabilization benefits. Recheck lipid panel on 2/19/2025. Patient contacted via phone and agreeable to plan.

## 2025-01-07 ENCOUNTER — OFFICE VISIT (OUTPATIENT)
Age: 55
End: 2025-01-07

## 2025-01-07 DIAGNOSIS — Z59.9 FINANCIAL DIFFICULTY: Primary | ICD-10-CM

## 2025-01-07 DIAGNOSIS — Z78.9 NEEDS ASSISTANCE WITH COMMUNITY RESOURCES: ICD-10-CM

## 2025-01-07 DIAGNOSIS — Z59.71 UNINSURED: ICD-10-CM

## 2025-01-07 SDOH — ECONOMIC STABILITY - INCOME SECURITY: PROBLEM RELATED TO HOUSING AND ECONOMIC CIRCUMSTANCES, UNSPECIFIED: Z59.9

## 2025-01-07 NOTE — PROGRESS NOTES
SW referral received from physician. Connected with the patient via phone today.  Introduced self and role, and offered support.  Patient identified financial difficulty related to medical expenses as his primary concern today.      Patient is 54 y.o. male who lives alone in home, not . Patient currently receives SSDI benefits. Patient is uninsured. Per patient has continuous medical care due to several health issues which add up financially.     SW Navigator discussed various options for assistance with medical expenses including applying for Medicaid and applying for [employer]. Patient is interested in applying for both. Patient understands that he is likely over the income limit for full-coverage benefit but may qualify for a spenddown due to disability.     Medicaid enrollment process completed via [email or web address]. Application completed today, kale #[id number]. ID uploaded to application.   Patient advised that he should hear back from [employer] within 45 days. Advised to respond to any request for additional documentation promptly and by due date to avoid denial of application.      Patient also agreed with applying for [employer] program. A [employer] application was mailed to patient to address on file. Explained that insured and uninsured patients qualify depending on family income, family size, and medical needs. Explained that the application will need to completed, and supportive documentation gathered, in order to apply, and it can take 90 days to be processed.  Patient to drop off the application at [facility] 1st floor.    Plan:  Ongoing psychosocial support and resource referral, as desired by the patient.      NORMAN Morton   Navigator

## 2025-02-05 ENCOUNTER — TELEPHONE (OUTPATIENT)
Age: 55
End: 2025-02-05

## 2025-02-05 NOTE — TELEPHONE ENCOUNTER
SW Navigator reached out to patient regarding notification received from [facility] advising that patient is not eligible for Medicaid. He has been placed on a spenddown. Copy of notice and spenddown fact sheet emailed to patient, per his request. Patient advised to present medical bills to the local DSS office so that these could be applied to the spenddown liability.    NORMAN Morton   Navigator

## 2025-07-01 ENCOUNTER — TELEPHONE (OUTPATIENT)
Age: 55
End: 2025-07-01

## 2025-07-01 NOTE — TELEPHONE ENCOUNTER
----- Message from JACKSON LYMAN RN sent at 2025 11:17 AM EDT -----  Caller: Steve Martinez (self)  Message Date: 25   Message Time: 11:15 AM  Call Back Number: [phone]      Message Received:  Name, , and phone number provided. No additional information left.

## 2025-07-01 NOTE — TELEPHONE ENCOUNTER
Spoke with Steve Martinez (self) who identified self with two patient identifiers (name and ).  On Release of Information Form? 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    Allergies:  No Known Allergies     Chief Complaint/Subjective:   Has Indwelling Catheter managed by Urology for urinary retention   Wears a leg drainage bag  This morning, leg bag turned a \"bluish\" color  Whole internal bag with blueish green color  \"Urine looked good\"  Drained urine yellow in color; patient stated not pale nor dark  Has had current drainage bag for ~2 weeks  Previous bag had a hole in it    Onset: 23     Current Symptoms:   \"Bluish/Greenish\" color in urine collection bag    Associated Symptoms:        Yes No Comment(s)   Mentality Changes [] [x]    SOB [] [x]    Dyspnea [] [x]    Chest Pain [] [x]    Palpitations [] [x]    Tachycardia [] [x]    Headache [] [x]    Visual Changes [] [x]    Lightheadedness [] [x]    Dizziness [] [x]    Slurred Speech [] [x]    Nausea [] [x]    Vomiting [] [x]        Fever/Chills: Declines     Current Pain Severity: 0      What has been tried:   On-Line Research (SeeMe)  Possible UTI    Better/Worse:   NA    Recommended disposition: Contact Specialist    Reason for Disposition:   Asymptomatic  Spoke with Dr. Morales    No future appointments.    Recommendations:   Change drainage bag; if new bag available  Contact Urology for additional advice and possible sooner appointment  Notify office of any changes or concerns